# Patient Record
Sex: FEMALE | Race: WHITE | Employment: UNEMPLOYED | URBAN - METROPOLITAN AREA
[De-identification: names, ages, dates, MRNs, and addresses within clinical notes are randomized per-mention and may not be internally consistent; named-entity substitution may affect disease eponyms.]

---

## 2017-04-30 ENCOUNTER — HOSPITAL ENCOUNTER (EMERGENCY)
Age: 37
Discharge: HOME | End: 2017-04-30
Attending: EMERGENCY MEDICINE

## 2017-04-30 VITALS
SYSTOLIC BLOOD PRESSURE: 140 MMHG | OXYGEN SATURATION: 98 % | TEMPERATURE: 98.2 F | RESPIRATION RATE: 20 BRPM | WEIGHT: 155 LBS | BODY MASS INDEX: 25.83 KG/M2 | HEIGHT: 65 IN | DIASTOLIC BLOOD PRESSURE: 90 MMHG | HEART RATE: 110 BPM

## 2017-04-30 DIAGNOSIS — J20.9 ACUTE BRONCHITIS, UNSPECIFIED ORGANISM: Primary | ICD-10-CM

## 2017-04-30 RX ORDER — ALBUTEROL SULFATE 90 UG/1
2 AEROSOL, METERED RESPIRATORY (INHALATION)
Qty: 1 INHALER | Refills: 0 | Status: SHIPPED | OUTPATIENT
Start: 2017-04-30 | End: 2017-08-22

## 2017-04-30 RX ORDER — AMOXICILLIN 875 MG/1
875 TABLET, FILM COATED ORAL 2 TIMES DAILY
Qty: 20 TAB | Refills: 0 | Status: SHIPPED | OUTPATIENT
Start: 2017-04-30 | End: 2017-05-10

## 2017-04-30 RX ORDER — BENZONATATE 100 MG/1
100 CAPSULE ORAL
Qty: 30 CAP | Refills: 0 | Status: SHIPPED | OUTPATIENT
Start: 2017-04-30 | End: 2017-05-07

## 2017-04-30 RX ORDER — ESCITALOPRAM OXALATE 20 MG/1
20 TABLET ORAL DAILY
COMMUNITY
End: 2020-08-27

## 2017-04-30 NOTE — UC PROVIDER NOTE
Patient is a 39 y.o. female presenting with cough. The history is provided by the patient. Cough   This is a new problem. The current episode started more than 2 days ago. The problem occurs hourly. The problem has not changed since onset. The cough is productive of sputum. There has been no fever. Associated symptoms include chest pain and rhinorrhea. Pertinent negatives include no chills, no sweats, no weight loss, no eye redness, no ear congestion, no ear pain, no headaches, no sore throat, no myalgias, no shortness of breath, no wheezing, no nausea, no vomiting and no confusion. Associated symptoms comments: Pain with coughing, sinus congestion, purulent nasal discharge. She has tried nothing for the symptoms. The treatment provided no relief. She is a smoker. Her past medical history is significant for asthma. Her past medical history does not include bronchitis, pneumonia, bronchiectasis, COPD, emphysema, cancer, heart failure or CHF.         Past Medical History:   Diagnosis Date    ADHD (attention deficit hyperactivity disorder)     Anxiety     Anxiety     Asthma     CHILDHOOD    Chronic back pain     Chronic pain     DISC ISSUES IN BACK    Psychiatric disorder     ANXIETY        Past Surgical History:   Procedure Laterality Date    BREAST SURGERY PROCEDURE UNLISTED  2002    reduction    HX BREAST REDUCTION      HX CYST REMOVAL Right     ganglion cyst removed from right wrist    HX GYN      BTL    HX GYN      D&C X 2    HX GYN      Hysterectomy    HX OTHER SURGICAL  5/2013    cyst removed from right hand    HX TONSIL AND ADENOIDECTOMY      HX TONSILLECTOMY      HX WISDOM TEETH EXTRACTION           Family History   Problem Relation Age of Onset    Anxiety Mother     Hypertension Mother     COPD Mother    [facility] Arthritis-osteo Mother     Psychiatric Disorder Mother     Hypertension Father     Cancer Brother     Schizophrenia Maternal Aunt     Heart Disease Maternal Grandfather     Stroke Maternal Grandfather     Cancer Paternal Grandmother     Anesth Problems Neg Hx         Social History     Social History    Marital status: LEGALLY      Spouse name: N/A    Number of children: N/A    Years of education: N/A     Occupational History    Not on file. Social History Main Topics    Smoking status: Former Smoker     Packs/day: 0.25     Years: 19.00    Smokeless tobacco: Current User      Comment: smokes vapor cig     Alcohol use 0.0 oz/week     0 Standard drinks or equivalent per week      Comment: Socially.  Drug use: No    Sexual activity: Not on file     Other Topics Concern    Not on file     Social History Narrative                ALLERGIES: Other food; Codeine; Tylenol-codeine #2; and Nuts [tree nut]    Review of Systems   Constitutional: Negative for chills, fever and weight loss. HENT: Positive for rhinorrhea. Negative for ear pain, mouth sores, nosebleeds and sore throat. Eyes: Negative. Negative for redness. Respiratory: Positive for cough and chest tightness. Negative for shortness of breath and wheezing. Cardiovascular: Positive for chest pain. Gastrointestinal: Negative. Negative for nausea and vomiting. Musculoskeletal: Negative. Negative for myalgias. Neurological: Negative for headaches. Psychiatric/Behavioral: Negative for confusion. All other systems reviewed and are negative. Vitals:    04/30/17 1428   BP: 140/90   Pulse: (!) 110   Resp: 20   Temp: 98.2 °F (36.8 °C)   SpO2: 98%   Weight: 70.3 kg (155 lb)   Height: 5' 5\" (1.651 m)       Physical Exam   Constitutional: She is oriented to person, place, and time. She appears well-developed and well-nourished. HENT:   Head: Normocephalic and atraumatic. Right Ear: External ear normal.   Left Ear: External ear normal.   Mouth/Throat: Oropharynx is clear and moist. No oropharyngeal exudate. Eyes: Conjunctivae and EOM are normal. Pupils are equal, round, and reactive to light. Right eye exhibits no discharge. Left eye exhibits no discharge. No scleral icterus. Neck: Normal range of motion. No tracheal deviation present. No thyromegaly present. Cardiovascular: Normal rate, regular rhythm and normal heart sounds. No murmur heard. Pulmonary/Chest: Effort normal and breath sounds normal. No respiratory distress. She has no wheezes. She has no rales. She exhibits no tenderness. Abdominal: Soft. Bowel sounds are normal. She exhibits no distension. There is no tenderness. There is no rebound and no guarding. Musculoskeletal: Normal range of motion. She exhibits no edema or tenderness. Lymphadenopathy:     She has no cervical adenopathy. Neurological: She is alert and oriented to person, place, and time. No cranial nerve deficit. Coordination normal.   Skin: Skin is warm. No erythema. Psychiatric: She has a normal mood and affect. Her behavior is normal. Judgment and thought content normal.   Nursing note and vitals reviewed.       MDM     Differential Diagnosis; Clinical Impression; Plan:     Acute bronchitis, no respiratory distress, doubt pneumonia      Procedures

## 2017-04-30 NOTE — DISCHARGE INSTRUCTIONS
Bronchitis: Care Instructions  Your Care Instructions    Bronchitis is inflammation of the bronchial tubes, which carry air to the lungs. The tubes swell and produce mucus, or phlegm. The mucus and inflamed bronchial tubes make you cough. You may have trouble breathing. Most cases of bronchitis are caused by viruses like those that cause colds. Antibiotics usually do not help and they may be harmful. Bronchitis usually develops rapidly and lasts about 2 to 3 weeks in otherwise healthy people. Follow-up care is a key part of your treatment and safety. Be sure to make and go to all appointments, and call your doctor if you are having problems. It's also a good idea to know your test results and keep a list of the medicines you take. How can you care for yourself at home? · Take all medicines exactly as prescribed. Call your doctor if you think you are having a problem with your medicine. · Get some extra rest.  · Take an over-the-counter pain medicine, such as acetaminophen (Tylenol), ibuprofen (Advil, Motrin), or naproxen (Aleve) to reduce fever and relieve body aches. Read and follow all instructions on the label. · Do not take two or more pain medicines at the same time unless the doctor told you to. Many pain medicines have acetaminophen, which is Tylenol. Too much acetaminophen (Tylenol) can be harmful. · Take an over-the-counter cough medicine that contains dextromethorphan to help quiet a dry, hacking cough so that you can sleep. Avoid cough medicines that have more than one active ingredient. Read and follow all instructions on the label. · Breathe moist air from a humidifier, hot shower, or sink filled with hot water. The heat and moisture will thin mucus so you can cough it out. · Do not smoke. Smoking can make bronchitis worse. If you need help quitting, talk to your doctor about stop-smoking programs and medicines. These can increase your chances of quitting for good.   When should you call for help? Call 911 anytime you think you may need emergency care. For example, call if:  · You have severe trouble breathing. Call your doctor now or seek immediate medical care if:  · You have new or worse trouble breathing. · You cough up dark brown or bloody mucus (sputum). · You have a new or higher fever. · You have a new rash. Watch closely for changes in your health, and be sure to contact your doctor if:  · You cough more deeply or more often, especially if you notice more mucus or a change in the color of your mucus. · You are not getting better as expected. Where can you learn more? Go to http://jake-laury.info/. Enter H333 in the search box to learn more about \"Bronchitis: Care Instructions. \"  Current as of: May 23, 2016  Content Version: 11.2  © 4020-5512 [employer]. Care instructions adapted under license by [employer] (which disclaims liability or warranty for this information). If you have questions about a medical condition or this instruction, always ask your healthcare professional. [address] any warranty or liability for your use of this information.

## 2017-05-19 ENCOUNTER — HOSPITAL ENCOUNTER (EMERGENCY)
Age: 37
Discharge: HOME | End: 2017-05-19
Attending: EMERGENCY MEDICINE
Payer: COMMERCIAL

## 2017-05-19 VITALS
SYSTOLIC BLOOD PRESSURE: 116 MMHG | DIASTOLIC BLOOD PRESSURE: 80 MMHG | TEMPERATURE: 97.7 F | HEART RATE: 87 BPM | HEIGHT: 65 IN | BODY MASS INDEX: 25.12 KG/M2 | WEIGHT: 150.79 LBS | OXYGEN SATURATION: 99 % | RESPIRATION RATE: 16 BRPM

## 2017-05-19 DIAGNOSIS — Z71.6 TOBACCO ABUSE COUNSELING: ICD-10-CM

## 2017-05-19 DIAGNOSIS — K62.89 ANAL PAIN: Primary | ICD-10-CM

## 2017-05-19 DIAGNOSIS — F41.9 ANXIETY AND DEPRESSION: ICD-10-CM

## 2017-05-19 DIAGNOSIS — F32.A ANXIETY AND DEPRESSION: ICD-10-CM

## 2017-05-19 DIAGNOSIS — S36.60XA RECTAL TRAUMA, INITIAL ENCOUNTER: ICD-10-CM

## 2017-05-19 LAB
APPEARANCE UR: ABNORMAL
BACTERIA URNS QL MICRO: ABNORMAL /HPF
BILIRUB UR QL: NEGATIVE
COLOR UR: ABNORMAL
EPITH CASTS URNS QL MICRO: ABNORMAL /LPF
GLUCOSE UR STRIP.AUTO-MCNC: NEGATIVE MG/DL
HCG UR QL: NEGATIVE
HGB UR QL STRIP: NEGATIVE
KETONES UR QL STRIP.AUTO: NEGATIVE MG/DL
LEUKOCYTE ESTERASE UR QL STRIP.AUTO: NEGATIVE
MUCOUS THREADS URNS QL MICRO: ABNORMAL /LPF
NITRITE UR QL STRIP.AUTO: NEGATIVE
PH UR STRIP: 5.5 [PH] (ref 5–8)
PROT UR STRIP-MCNC: NEGATIVE MG/DL
RBC #/AREA URNS HPF: ABNORMAL /HPF (ref 0–5)
SP GR UR REFRACTOMETRY: 1.02 (ref 1–1.03)
UA: UC IF INDICATED,UAUC: ABNORMAL
UROBILINOGEN UR QL STRIP.AUTO: 1 EU/DL (ref 0.2–1)
WBC URNS QL MICRO: ABNORMAL /HPF (ref 0–4)

## 2017-05-19 PROCEDURE — 81001 URINALYSIS AUTO W/SCOPE: CPT | Performed by: PHYSICIAN ASSISTANT

## 2017-05-19 PROCEDURE — 81025 URINE PREGNANCY TEST: CPT | Performed by: PHYSICIAN ASSISTANT

## 2017-05-19 PROCEDURE — 99283 EMERGENCY DEPT VISIT LOW MDM: CPT

## 2017-05-19 PROCEDURE — 87086 URINE CULTURE/COLONY COUNT: CPT | Performed by: PHYSICIAN ASSISTANT

## 2017-05-19 RX ORDER — DOCUSATE CALCIUM 240 MG
240 CAPSULE ORAL 2 TIMES DAILY
Qty: 30 CAP | Refills: 0 | Status: SHIPPED | OUTPATIENT
Start: 2017-05-19 | End: 2017-08-17

## 2017-05-19 RX ORDER — POLYETHYLENE GLYCOL 3350 17 G/17G
17 POWDER, FOR SOLUTION ORAL DAILY
Qty: 119 G | Refills: 0 | Status: SHIPPED | OUTPATIENT
Start: 2017-05-19 | End: 2017-08-22

## 2017-05-19 RX ORDER — LIDOCAINE HYDROCHLORIDE 20 MG/ML
JELLY TOPICAL
Qty: 20 ML | Refills: 0 | Status: SHIPPED | OUTPATIENT
Start: 2017-05-19 | End: 2017-08-22

## 2017-05-19 RX ORDER — CLONAZEPAM 0.5 MG/1
0.5 TABLET ORAL 2 TIMES DAILY
COMMUNITY
End: 2020-08-27

## 2017-05-19 NOTE — LETTER
[address] 
[facility] EMERGENCY DEPT 
[address] [place] [address] [address] [id number] 
[phone] Work/School Note Date: 5/19/2017 To Whom It May concern: 
 
Esther Hinson was seen and treated today in the emergency room by the following provider(s): 
Attending Provider: Shell De La Cruz MD 
Physician Assistant: Jorge Alberto Nance. Bam Pinzonma. Esther Hinson may return to work on 05/21/2017. Sincerely, 
 
 
 
 
Jogre Alberto Nance.  [place], [place]

## 2017-05-19 NOTE — DISCHARGE INSTRUCTIONS
Anal Pain: Care Instructions  Your Care Instructions  Pain in the opening to the rectum (anus) can be caused by diarrhea or constipation or by scratching a rectal itch. A common cause of anal pain is a tear in the lining of the lower rectum (anal fissure). This type of anal pain usually goes away when the problem clears up. Injury during anal sex or from an object being placed in the rectum also can cause pain. A rare cause of anal pain is spasms of the muscles in the rectum. Some of these conditions may cause some light bleeding. Home treatment usually can relieve anal pain. If you continue to have anal pain, your doctor may prescribe medicine to relieve pain and other symptoms. Depending on the cause, you may need other treatment. Follow-up care is a key part of your treatment and safety. Be sure to make and go to all appointments, and call your doctor if you are having problems. It's also a good idea to know your test results and keep a list of the medicines you take. How can you care for yourself at home? · Sit in a few inches of warm water (sitz bath) 3 times a day and after bowel movements. The warm water eases discomfort. Do not put soaps, salts, or shampoos in the water. · Drink plenty of fluids, enough so that your urine is light yellow or clear like water. If you have kidney, heart, or liver disease and have to limit fluids, talk with your doctor before you increase the amount of fluids you drink. · Include high-fiber foods, such as fruits, vegetables, beans, and whole grains, in your diet each day. · Take a fiber supplement, such as Benefiber, Citrucel, or Metamucil, every day. Read and follow all instructions on the label. · Use the toilet when you feel the urge. Or when you can, schedule time each day for a bowel movement. A daily routine may help. Take your time and do not strain when having a bowel movement. But do not sit on the toilet too long.   · Support your feet with a small step stool when you sit on the toilet. This helps flex your hips and places your pelvis in a squatting position. · Your doctor may recommend an over-the-counter laxative, such as Miralax, Milk of Magnesia, or Ex-Lax. Read and follow all instructions on the label, and do not use laxatives on a long-term basis. · Do not use over-the-counter ointments or creams without talking to your doctor. Some of these may not help. · Use baby wipes or medicated pads, such as Preparation H or Tucks, instead of toilet paper to clean after a bowel movement. These products do not irritate the anus. · Be safe with medicines. Read and follow all instructions on the label. If the doctor gave you a prescription medicine for pain, take it as prescribed. If you are not taking a prescription pain medicine, ask your doctor if you can take an over-the-counter medicine. When should you call for help? Call 911 anytime you think you may need emergency care. For example, call if:  · You passed out (lost consciousness). Call your doctor now or seek immediate medical care if:  · You have a fever. · You have swelling, a lump, a sore, or a new growth in or around your anus. · Your stools are black and tarlike or have streaks of blood. Watch closely for changes in your health, and be sure to contact your doctor if:  · You do not get better as expected. Where can you learn more? Go to http://jake-laury.info/. Enter 152 7590 in the search box to learn more about \"Anal Pain: Care Instructions. \"  Current as of: August 9, 2016  Content Version: 11.2  © 0494-9820 [employer]. Care instructions adapted under license by [employer] (which disclaims liability or warranty for this information). If you have questions about a medical condition or this instruction, always ask your healthcare professional. [address] any warranty or liability for your use of this information.          Injury to the Rectum and Vagina: Care Instructions  Your Care Instructions  Injury to the rectum and vagina can cause many problems. These problems may include rectal or vaginal bleeding, infection, constipation, pain, or leaking of stool. The injury can be caused by an accident, childbirth, or physical or sexual abuse. Medicines can treat pain. They can also prevent infection. Surgery may be needed to treat severe injuries. Follow-up care is a key part of your treatment and safety. Be sure to make and go to all appointments, and call your doctor if you are having problems. It's also a good idea to know your test results and keep a list of the medicines you take. How can you care for yourself at home? · Be safe with medicines. Read and follow all instructions on the label. ¨ If the doctor gave you a prescription medicine for pain, take it as prescribed. ¨ If you are not taking a prescription pain medicine, ask your doctor if you can take an over-the-counter medicine. · If your doctor suggests, sit in a few inches of water (sitz bath) 3 times a day and after bowel movements. The warm water helps with pain and itching. · If you do not have a safe place to stay, tell your doctor. When should you call for help? Call 911 anytime you think you may need emergency care. For example, call if:  · You passed out (lost consciousness). · You feel that you are in danger. Call your doctor now or seek immediate medical care if:  · You have new or worse rectal or vaginal bleeding. · You are dizzy or lightheaded, or you feel like you may faint. · You have a fever. · You have sudden, severe pain in your belly or pelvis. Watch closely for changes in your health, and be sure to contact your doctor if:  · You need support for domestic violence or sexual abuse. · You do not get better as expected. Where can you learn more? Go to http://jake-laury.info/.   Enter X034 in the search box to learn more about \"Injury to the Rectum and Vagina: Care Instructions. \"  Current as of: May 27, 2016  Content Version: 11.2  © 2833-6360 [employer]. Care instructions adapted under license by [employer] (which disclaims liability or warranty for this information). If you have questions about a medical condition or this instruction, always ask your healthcare professional. [address] any warranty or liability for your use of this information.

## 2017-05-19 NOTE — ED NOTES
Received pt to exam room, resting on stretcher in position of comfort, call bell given to pt, pt reports sudden onset of rectal pain at 0230 today, states the pain woke her up, her last BM was yesterday and she denies constipation or straining to use the BR, pt reports having anal intercourse 1 wk ago with no subsequent pain after

## 2017-05-19 NOTE — ED PROVIDER NOTES
HPI Comments:   Corona Shabazz is a 39 y.o. female with a hx of anxiety, ADHD, and asthma presenting to the ED C/O rectal pain (8/10) which started early this morning and woke her out of her sleep. The pain worsens with sitting and any movement. Pt denies experiencing similar pain in the past. She has taken Tylenol and Advil for the pain with no relief. Pt last had anal intercourse 1 week ago, which she has previously done in the past, but this was with a new partner. LNBM was yesterday. She is a current smoker. PSHx significant for hysterectomy. Patient denies constipation, rectal bleeding, fever, chills, abd pain, vaginal discharge, or any other symptoms or complaints. PCP: Alan Driver MD    There are no other complaints, changes or physical findings at this time. Written by KARISHMA Berrios, as dictated by Debbie Davis PA-C      The history is provided by the patient. No  was used.         Past Medical History:   Diagnosis Date    ADHD (attention deficit hyperactivity disorder)     Anxiety     Anxiety     Asthma     CHILDHOOD    Chronic back pain     Chronic pain     DISC ISSUES IN BACK    Psychiatric disorder     ANXIETY       Past Surgical History:   Procedure Laterality Date    BREAST SURGERY PROCEDURE UNLISTED  2002    reduction    HX BREAST REDUCTION      HX CYST REMOVAL Right     ganglion cyst removed from right wrist    HX GYN      BTL    HX GYN      D&C X 2    HX GYN      Hysterectomy    HX OTHER SURGICAL  5/2013    cyst removed from right hand    HX TONSIL AND ADENOIDECTOMY      HX TONSILLECTOMY      HX WISDOM TEETH EXTRACTION           Family History:   Problem Relation Age of Onset    Anxiety Mother     Hypertension Mother     COPD Mother     Arthritis-osteo Mother     Psychiatric Disorder Mother     Hypertension Father     Cancer Brother     Schizophrenia Maternal Aunt     Heart Disease Maternal Grandfather     Stroke Maternal Grandfather     Cancer Paternal Grandmother     Anesth Problems Neg Hx        Social History     Social History    Marital status: LEGALLY      Spouse name: N/A    Number of children: N/A    Years of education: N/A     Occupational History    Not on file. Social History Main Topics    Smoking status: Current Some Day Smoker     Packs/day: 0.25     Years: 19.00    Smokeless tobacco: Current User      Comment: smokes vapor cig     Alcohol use 0.0 oz/week     0 Standard drinks or equivalent per week      Comment: Socially.  Drug use: No    Sexual activity: Not on file     Other Topics Concern    Not on file     Social History Narrative         ALLERGIES: Other food; Codeine; Tylenol-codeine #2; and Nuts [tree nut]    Review of Systems   Constitutional: Negative. Negative for activity change, appetite change, chills, diaphoresis, fever and unexpected weight change. HENT: Negative for congestion, hearing loss, rhinorrhea, sinus pressure, sneezing, sore throat and trouble swallowing. Eyes: Negative for pain, redness, itching and visual disturbance. Respiratory: Negative for cough, shortness of breath and wheezing. Cardiovascular: Negative for chest pain, palpitations and leg swelling. Gastrointestinal: Positive for rectal pain. Negative for abdominal pain, blood in stool, constipation, diarrhea, nausea and vomiting. Genitourinary: Negative for dysuria and vaginal discharge. Musculoskeletal: Negative for arthralgias, gait problem and myalgias. Skin: Negative for color change, pallor, rash and wound. Neurological: Negative for tremors, weakness, light-headedness, numbness and headaches. All other systems reviewed and are negative.       Vitals:    05/19/17 0929   BP: 116/80   Pulse: 87   Resp: 16   Temp: 97.7 °F (36.5 °C)   SpO2: 99%   Weight: 68.4 kg (150 lb 12.7 oz)   Height: 5' 5\" (1.651 m)            Physical Exam   Constitutional: She is oriented to person, place, and time. She appears well-developed and well-nourished. No distress. 39 y.o.  female in NAD  Communicates appropriately and in full sentences   HENT:   Head: Normocephalic and atraumatic. Right Ear: External ear normal.   Left Ear: External ear normal.   Mouth/Throat: Oropharynx is clear and moist. No oropharyngeal exudate. Eyes: Conjunctivae are normal. Pupils are equal, round, and reactive to light. Right eye exhibits no discharge. Left eye exhibits no discharge. Neck: Normal range of motion. Neck supple. No tracheal deviation present. Cardiovascular: Normal rate, regular rhythm, normal heart sounds and intact distal pulses. Pulmonary/Chest: Effort normal and breath sounds normal. No stridor. No respiratory distress. She has no wheezes. Abdominal: Soft. Bowel sounds are normal. She exhibits no distension. No abd tenderness to light or deep palpation   Genitourinary: Rectal exam shows internal hemorrhoid and tenderness. Rectal exam shows no external hemorrhoid, no fissure, no mass, anal tone normal and guaiac negative stool. Genitourinary Comments: Light brown stool, heme negative, internal hemorrhoid at 12 o'clock position  Small linear lacerations to suggest anal trauma sexually or with object, not actively bleeding   Musculoskeletal: Normal range of motion. She exhibits no edema, tenderness or deformity. No neurologic, motor, vascular, or compartment embarrassment observed on exam. No focal neurologic deficits. Lymphadenopathy:     She has no cervical adenopathy. Neurological: She is alert and oriented to person, place, and time. No cranial nerve deficit. Coordination normal.   Skin: Skin is warm and dry. No rash noted. She is not diaphoretic. No erythema. No pallor. Psychiatric: She has a normal mood and affect. Nursing note and vitals reviewed.        MDM  Number of Diagnoses or Management Options  Anal pain:   Anxiety and depression:   Rectal trauma, initial encounter: Tobacco abuse counseling:   Diagnosis management comments: DDx: hemorrhoids, anal sex, perirectal abscess, colonial cyst    40 yo with anal pain after experiencing anal intercourse in the last week with new partner. Pt denies melena, vaginal bleeding, discharge. Performed anoscopy which revealed signs of anal trauma/penetration. Will discharge with Vagisil and lidocaine jelly. Vital signs remain stable without fever or tachycardia. Stable for discharge. Pt expresses understanding. Provided customary ED return precautions. Amount and/or Complexity of Data Reviewed  Clinical lab tests: ordered and reviewed    Patient Progress  Patient progress: stable    Procedures    I reviewed our electronic medical record system for any past medical records that were available that may contribute to the patients current condition, the nursing notes and vital signs from today's visit     Nursing notes will be reviewed as they become available in realtime while the pt is in the ED. Progress Note:  9:40 AM  The patients presenting problems have been discussed, and they are in agreement with the care plan formulated and outlined with them. I have encouraged them to ask questions as they arise throughout their visit. Will continue to monitor. TOBACCO COUNSELING:  Upon evaluation, pt expressed that they are a current tobacco user. Pt has been counseled on the dangers of smoking and was encouraged to quit as soon as possible in order to decrease further risks to their health. Pt has conveyed their understanding of the risks involved should they continue to use tobacco products. PROCEDURE NOTE - RECTAL EXAM:   10:15 AM  Performed by: [employer]  Rectal exam performed. Light brown stool was collected. Stool was Hemoccult tested, and found to be heme Negative. The procedure took 1-15 minutes, and pt tolerated well.   Written by KARISHMA Goel, as dictated by [employer], ENRRI[employer].    Progress Note:  10:17 AM  Spoke with Dr. Olivia Huber, she agrees with the plan as discussed. Will call [facility] to obtain an anoscope for a more thorough rectal exam.    Procedure Note - Anoscope Exam:   10:47 AM  Performed by: [employer], ENRRIQUE   Anoscope exam performed. Multiple small, non-bleeing linear cuts in rectal vault suggestive of anal trauma/penetration. The procedure took 1-15 minutes, and pt tolerated well. DISCHARGE NOTE:  11:05 AM  Ivana Hodges  results have been reviewed with her. She has been counseled regarding her diagnosis. She verbally conveys understanding and agreement of the signs, symptoms, diagnosis, treatment and prognosis and additionally agrees to follow up as recommended with Dr. Sb Long MD in 24 - 48 hours. She also agrees with the care-plan and conveys that all of her questions have been answered. I have also put together some discharge instructions for her that include: 1) educational information regarding their diagnosis, 2) how to care for their diagnosis at home, as well a 3) list of reasons why they would want to return to the ED prior to their follow-up appointment, should their condition change. She and/or family's questions have been answered. I have encouraged them to see the official results in [facility]\" or to retrieve the specifics of their results from medical records.        LABS COMPLETED AND REVIEWED:  Recent Results (from the past 12 hour(s))   URINALYSIS W/ REFLEX CULTURE    Collection Time: 05/19/17 10:32 AM   Result Value Ref Range    Color YELLOW/STRAW      Appearance CLOUDY (A) CLEAR      Specific gravity 1.020 1.003 - 1.030      pH (UA) 5.5 5.0 - 8.0      Protein NEGATIVE  NEG mg/dL    Glucose NEGATIVE  NEG mg/dL    Ketone NEGATIVE  NEG mg/dL    Bilirubin NEGATIVE  NEG      Blood NEGATIVE  NEG      Urobilinogen 1.0 0.2 - 1.0 EU/dL    Nitrites NEGATIVE  NEG      Leukocyte Esterase NEGATIVE  NEG      WBC 0-4 0 - 4 /hpf    RBC 0-5 0 - 5 /hpf    Epithelial cells MANY (A) FEW /lpf    Bacteria 1+ (A) NEG /hpf    UA:UC IF INDICATED URINE CULTURE ORDERED (A) CNI      Mucus 1+ (A) NEG /lpf   HCG URINE, QL    Collection Time: 05/19/17 10:32 AM   Result Value Ref Range    HCG urine, Ql. NEGATIVE  NEG         CLINICAL IMPRESSION:  1. Anal pain    2. Rectal trauma, initial encounter    3. Anxiety and depression    4. Tobacco abuse counseling        Plan:  1. Return precautions  2. Medications as prescribed  3. Follow-ups as discussed  Discharge Medication List as of 5/19/2017 11:03 AM      START taking these medications    Details   lidocaine (URO-JET) 2 % jelp jelly Apply to affected area, Normal, Disp-20 mL, R-0      benzocaine-resorcinol-aloe-vit E-vit A&D (VAGISIL) 20-3 % topical cream Apply 3 g to affected area three (3) times daily. , Normal, Disp-28 g, R-0         CONTINUE these medications which have NOT CHANGED    Details   clonazePAM (KLONOPIN) 0.5 mg tablet Take 0.5 mg by mouth two (2) times a day., Historical Med      Lisdexamfetamine (VYVANSE) 50 mg cap Take by mouth daily. , Historical Med      escitalopram oxalate (LEXAPRO) 20 mg tablet Take 20 mg by mouth daily. , Historical Med      ALPRAZolam (XANAX) 1 mg tablet Take 1-2 mg by mouth three (3) times daily as needed., Historical Med, R-2      albuterol (VENTOLIN HFA) 90 mcg/actuation inhaler Take 2 Puffs by inhalation every six (6) hours as needed for Wheezing., Normal, Disp-1 Inhaler, R-0           Follow-up Information     Follow up With Details Comments Contact Info    Kane Schneider MD Schedule an appointment as soon as possible for a visit in 2 days As needed, If symptoms worsen, Possible further evaluation and treatment [place] [address]  [address] [facility]  [phone]      [facility] EMERGENCY DEPT Go to As needed, If symptoms worsen [address] [facility] [address] Watson Lay MD Schedule an appointment as soon as possible for a visit in 2 days As needed, If symptoms worsen, Possible further evaluation and treatment [address] [address] [phone]          Return to the closest emergency room or follow up sooner for any deterioration    Attestation: This note is prepared by Angel Bingham, acting as Scribe for Luciano Page PA-C. Luciano Page PA-C: The scribe's documentation has been prepared under my direction and personally reviewed by me in its entirety. I confirm that the note above accurately reflects all work, treatment, procedures, and medical decision making performed by me. This note will not be viewable in [address]

## 2017-05-20 ENCOUNTER — ANESTHESIA EVENT (OUTPATIENT)
Dept: SURGERY | Age: 37
DRG: 854 | End: 2017-05-20
Payer: COMMERCIAL

## 2017-05-20 ENCOUNTER — APPOINTMENT (OUTPATIENT)
Dept: CT IMAGING | Age: 37
DRG: 854 | End: 2017-05-20
Attending: EMERGENCY MEDICINE
Payer: COMMERCIAL

## 2017-05-20 ENCOUNTER — ANESTHESIA (OUTPATIENT)
Dept: SURGERY | Age: 37
DRG: 854 | End: 2017-05-20
Payer: COMMERCIAL

## 2017-05-20 ENCOUNTER — HOSPITAL ENCOUNTER (INPATIENT)
Age: 37
LOS: 4 days | Discharge: HOME | DRG: 854 | End: 2017-05-24
Attending: EMERGENCY MEDICINE | Admitting: COLON & RECTAL SURGERY
Payer: COMMERCIAL

## 2017-05-20 DIAGNOSIS — K61.0 PERIANAL ABSCESS: Primary | ICD-10-CM

## 2017-05-20 LAB
ALBUMIN SERPL BCP-MCNC: 3.4 G/DL (ref 3.5–5)
ALBUMIN/GLOB SERPL: 1.1 {RATIO} (ref 1.1–2.2)
ALP SERPL-CCNC: 55 U/L (ref 45–117)
ALT SERPL-CCNC: 20 U/L (ref 12–78)
ANION GAP BLD CALC-SCNC: 5 MMOL/L (ref 5–15)
APPEARANCE UR: CLEAR
AST SERPL W P-5'-P-CCNC: 15 U/L (ref 15–37)
BACTERIA URNS QL MICRO: ABNORMAL /HPF
BASOPHILS # BLD AUTO: 0 K/UL (ref 0–0.1)
BASOPHILS # BLD: 0 % (ref 0–1)
BILIRUB SERPL-MCNC: 0.7 MG/DL (ref 0.2–1)
BILIRUB UR QL: NEGATIVE
BUN SERPL-MCNC: 10 MG/DL (ref 6–20)
BUN/CREAT SERPL: 11 (ref 12–20)
CALCIUM SERPL-MCNC: 8.8 MG/DL (ref 8.5–10.1)
CHLORIDE SERPL-SCNC: 105 MMOL/L (ref 97–108)
CO2 SERPL-SCNC: 29 MMOL/L (ref 21–32)
COLOR UR: ABNORMAL
CREAT SERPL-MCNC: 0.87 MG/DL (ref 0.55–1.02)
DIFFERENTIAL METHOD BLD: ABNORMAL
EOSINOPHIL # BLD: 0 K/UL (ref 0–0.4)
EOSINOPHIL NFR BLD: 0 % (ref 0–7)
EPITH CASTS URNS QL MICRO: ABNORMAL /LPF
ERYTHROCYTE [DISTWIDTH] IN BLOOD BY AUTOMATED COUNT: 13.4 % (ref 11.5–14.5)
GLOBULIN SER CALC-MCNC: 3.2 G/DL (ref 2–4)
GLUCOSE SERPL-MCNC: 96 MG/DL (ref 65–100)
GLUCOSE UR STRIP.AUTO-MCNC: NEGATIVE MG/DL
HCG UR QL: NEGATIVE
HCT VFR BLD AUTO: 34 % (ref 35–47)
HGB BLD-MCNC: 12 G/DL (ref 11.5–16)
HGB UR QL STRIP: NEGATIVE
KETONES UR QL STRIP.AUTO: 15 MG/DL
LACTATE SERPL-SCNC: 1.9 MMOL/L (ref 0.4–2)
LEUKOCYTE ESTERASE UR QL STRIP.AUTO: NEGATIVE
LYMPHOCYTES # BLD AUTO: 2 % (ref 12–49)
LYMPHOCYTES # BLD: 0.3 K/UL (ref 0.8–3.5)
MCH RBC QN AUTO: 33.2 PG (ref 26–34)
MCHC RBC AUTO-ENTMCNC: 35.3 G/DL (ref 30–36.5)
MCV RBC AUTO: 94.2 FL (ref 80–99)
MONOCYTES # BLD: 0.8 K/UL (ref 0–1)
MONOCYTES NFR BLD AUTO: 5 % (ref 5–13)
NEUTS SEG # BLD: 15.8 K/UL (ref 1.8–8)
NEUTS SEG NFR BLD AUTO: 93 % (ref 32–75)
NITRITE UR QL STRIP.AUTO: NEGATIVE
PH UR STRIP: 7 [PH] (ref 5–8)
PLATELET # BLD AUTO: 132 K/UL (ref 150–400)
POTASSIUM SERPL-SCNC: 3.3 MMOL/L (ref 3.5–5.1)
PROT SERPL-MCNC: 6.6 G/DL (ref 6.4–8.2)
PROT UR STRIP-MCNC: NEGATIVE MG/DL
RBC # BLD AUTO: 3.61 M/UL (ref 3.8–5.2)
RBC #/AREA URNS HPF: ABNORMAL /HPF (ref 0–5)
RBC MORPH BLD: ABNORMAL
SODIUM SERPL-SCNC: 139 MMOL/L (ref 136–145)
SP GR UR REFRACTOMETRY: >1.03 (ref 1–1.03)
UA: UC IF INDICATED,UAUC: ABNORMAL
UROBILINOGEN UR QL STRIP.AUTO: 1 EU/DL (ref 0.2–1)
WBC # BLD AUTO: 16.9 K/UL (ref 3.6–11)
WBC URNS QL MICRO: ABNORMAL /HPF (ref 0–4)

## 2017-05-20 PROCEDURE — 77030011640 HC PAD GRND REM COVD -A: Performed by: COLON & RECTAL SURGERY

## 2017-05-20 PROCEDURE — 74011250636 HC RX REV CODE- 250/636

## 2017-05-20 PROCEDURE — 87086 URINE CULTURE/COLONY COUNT: CPT | Performed by: EMERGENCY MEDICINE

## 2017-05-20 PROCEDURE — 76060000032 HC ANESTHESIA 0.5 TO 1 HR: Performed by: COLON & RECTAL SURGERY

## 2017-05-20 PROCEDURE — 74011250636 HC RX REV CODE- 250/636: Performed by: ANESTHESIOLOGY

## 2017-05-20 PROCEDURE — 81025 URINE PREGNANCY TEST: CPT | Performed by: EMERGENCY MEDICINE

## 2017-05-20 PROCEDURE — 99285 EMERGENCY DEPT VISIT HI MDM: CPT

## 2017-05-20 PROCEDURE — 74011250637 HC RX REV CODE- 250/637: Performed by: COLON & RECTAL SURGERY

## 2017-05-20 PROCEDURE — 0D9P0ZZ DRAINAGE OF RECTUM, OPEN APPROACH: ICD-10-PCS | Performed by: COLON & RECTAL SURGERY

## 2017-05-20 PROCEDURE — 96365 THER/PROPH/DIAG IV INF INIT: CPT

## 2017-05-20 PROCEDURE — 99218 HC RM OBSERVATION: CPT

## 2017-05-20 PROCEDURE — 96375 TX/PRO/DX INJ NEW DRUG ADDON: CPT

## 2017-05-20 PROCEDURE — 74011250636 HC RX REV CODE- 250/636: Performed by: COLON & RECTAL SURGERY

## 2017-05-20 PROCEDURE — 94762 N-INVAS EAR/PLS OXIMTRY CONT: CPT

## 2017-05-20 PROCEDURE — 74011000250 HC RX REV CODE- 250

## 2017-05-20 PROCEDURE — 77030032490 HC SLV COMPR SCD KNE COVD -B

## 2017-05-20 PROCEDURE — 77030020061 HC IV BLD WRMR ADMIN SET 3M -B: Performed by: ANESTHESIOLOGY

## 2017-05-20 PROCEDURE — 87147 CULTURE TYPE IMMUNOLOGIC: CPT | Performed by: EMERGENCY MEDICINE

## 2017-05-20 PROCEDURE — 80053 COMPREHEN METABOLIC PANEL: CPT | Performed by: EMERGENCY MEDICINE

## 2017-05-20 PROCEDURE — 65270000029 HC RM PRIVATE

## 2017-05-20 PROCEDURE — 77030026438 HC STYL ET INTUB CARD -A: Performed by: ANESTHESIOLOGY

## 2017-05-20 PROCEDURE — 81001 URINALYSIS AUTO W/SCOPE: CPT | Performed by: EMERGENCY MEDICINE

## 2017-05-20 PROCEDURE — 74011000250 HC RX REV CODE- 250: Performed by: EMERGENCY MEDICINE

## 2017-05-20 PROCEDURE — 74011636320 HC RX REV CODE- 636/320: Performed by: EMERGENCY MEDICINE

## 2017-05-20 PROCEDURE — 36415 COLL VENOUS BLD VENIPUNCTURE: CPT | Performed by: EMERGENCY MEDICINE

## 2017-05-20 PROCEDURE — 96376 TX/PRO/DX INJ SAME DRUG ADON: CPT

## 2017-05-20 PROCEDURE — 87075 CULTR BACTERIA EXCEPT BLOOD: CPT | Performed by: EMERGENCY MEDICINE

## 2017-05-20 PROCEDURE — 96372 THER/PROPH/DIAG INJ SC/IM: CPT

## 2017-05-20 PROCEDURE — 77030008684 HC TU ET CUF COVD -B: Performed by: ANESTHESIOLOGY

## 2017-05-20 PROCEDURE — 87205 SMEAR GRAM STAIN: CPT | Performed by: EMERGENCY MEDICINE

## 2017-05-20 PROCEDURE — 76010000138 HC OR TIME 0.5 TO 1 HR: Performed by: COLON & RECTAL SURGERY

## 2017-05-20 PROCEDURE — 0DJD8ZZ INSPECTION OF LOWER INTESTINAL TRACT, VIA NATURAL OR ARTIFICIAL OPENING ENDOSCOPIC: ICD-10-PCS | Performed by: COLON & RECTAL SURGERY

## 2017-05-20 PROCEDURE — 96361 HYDRATE IV INFUSION ADD-ON: CPT

## 2017-05-20 PROCEDURE — 77030008771 HC TU NG SALEM SUMP -A: Performed by: ANESTHESIOLOGY

## 2017-05-20 PROCEDURE — 74011250636 HC RX REV CODE- 250/636: Performed by: EMERGENCY MEDICINE

## 2017-05-20 PROCEDURE — 74011000250 HC RX REV CODE- 250: Performed by: COLON & RECTAL SURGERY

## 2017-05-20 PROCEDURE — 85025 COMPLETE CBC W/AUTO DIFF WBC: CPT | Performed by: EMERGENCY MEDICINE

## 2017-05-20 PROCEDURE — 76210000016 HC OR PH I REC 1 TO 1.5 HR: Performed by: COLON & RECTAL SURGERY

## 2017-05-20 PROCEDURE — 77030019908 HC STETH ESOPH SIMS -A: Performed by: ANESTHESIOLOGY

## 2017-05-20 PROCEDURE — 74177 CT ABD & PELVIS W/CONTRAST: CPT

## 2017-05-20 PROCEDURE — 77030013079 HC BLNKT BAIR HGGR 3M -A: Performed by: ANESTHESIOLOGY

## 2017-05-20 PROCEDURE — 83605 ASSAY OF LACTIC ACID: CPT | Performed by: EMERGENCY MEDICINE

## 2017-05-20 RX ORDER — ACETAMINOPHEN 325 MG/1
650 TABLET ORAL
Status: DISCONTINUED | OUTPATIENT
Start: 2017-05-20 | End: 2017-05-24 | Stop reason: HOSPADM

## 2017-05-20 RX ORDER — LEVOFLOXACIN 5 MG/ML
500 INJECTION, SOLUTION INTRAVENOUS ONCE
Status: DISCONTINUED | OUTPATIENT
Start: 2017-05-20 | End: 2017-05-20

## 2017-05-20 RX ORDER — ONDANSETRON 2 MG/ML
4 INJECTION INTRAMUSCULAR; INTRAVENOUS
Status: COMPLETED | OUTPATIENT
Start: 2017-05-20 | End: 2017-05-20

## 2017-05-20 RX ORDER — SUCCINYLCHOLINE CHLORIDE 20 MG/ML
INJECTION INTRAMUSCULAR; INTRAVENOUS AS NEEDED
Status: DISCONTINUED | OUTPATIENT
Start: 2017-05-20 | End: 2017-05-20 | Stop reason: HOSPADM

## 2017-05-20 RX ORDER — SODIUM CHLORIDE 9 MG/ML
125 INJECTION, SOLUTION INTRAVENOUS CONTINUOUS
Status: DISCONTINUED | OUTPATIENT
Start: 2017-05-20 | End: 2017-05-24 | Stop reason: HOSPADM

## 2017-05-20 RX ORDER — METRONIDAZOLE 500 MG/100ML
500 INJECTION, SOLUTION INTRAVENOUS
Status: COMPLETED | OUTPATIENT
Start: 2017-05-20 | End: 2017-05-22

## 2017-05-20 RX ORDER — HYDROMORPHONE HYDROCHLORIDE 2 MG/ML
INJECTION, SOLUTION INTRAMUSCULAR; INTRAVENOUS; SUBCUTANEOUS
Status: DISPENSED
Start: 2017-05-20 | End: 2017-05-20

## 2017-05-20 RX ORDER — NALOXONE HYDROCHLORIDE 0.4 MG/ML
0.4 INJECTION, SOLUTION INTRAMUSCULAR; INTRAVENOUS; SUBCUTANEOUS
Status: DISCONTINUED | OUTPATIENT
Start: 2017-05-20 | End: 2017-05-24 | Stop reason: HOSPADM

## 2017-05-20 RX ORDER — HYDROMORPHONE HYDROCHLORIDE 1 MG/ML
1 INJECTION, SOLUTION INTRAMUSCULAR; INTRAVENOUS; SUBCUTANEOUS
Status: COMPLETED | OUTPATIENT
Start: 2017-05-20 | End: 2017-05-20

## 2017-05-20 RX ORDER — ONDANSETRON 2 MG/ML
INJECTION INTRAMUSCULAR; INTRAVENOUS AS NEEDED
Status: DISCONTINUED | OUTPATIENT
Start: 2017-05-20 | End: 2017-05-20 | Stop reason: HOSPADM

## 2017-05-20 RX ORDER — LIDOCAINE HYDROCHLORIDE 10 MG/ML
INJECTION, SOLUTION EPIDURAL; INFILTRATION; INTRACAUDAL; PERINEURAL
Status: COMPLETED
Start: 2017-05-20 | End: 2017-05-20

## 2017-05-20 RX ORDER — SODIUM CHLORIDE 0.9 % (FLUSH) 0.9 %
5-10 SYRINGE (ML) INJECTION AS NEEDED
Status: DISCONTINUED | OUTPATIENT
Start: 2017-05-20 | End: 2017-05-24 | Stop reason: HOSPADM

## 2017-05-20 RX ORDER — FENTANYL CITRATE 50 UG/ML
25 INJECTION, SOLUTION INTRAMUSCULAR; INTRAVENOUS
Status: DISCONTINUED | OUTPATIENT
Start: 2017-05-20 | End: 2017-05-20 | Stop reason: HOSPADM

## 2017-05-20 RX ORDER — DEXAMETHASONE SODIUM PHOSPHATE 4 MG/ML
INJECTION, SOLUTION INTRA-ARTICULAR; INTRALESIONAL; INTRAMUSCULAR; INTRAVENOUS; SOFT TISSUE AS NEEDED
Status: DISCONTINUED | OUTPATIENT
Start: 2017-05-20 | End: 2017-05-20 | Stop reason: HOSPADM

## 2017-05-20 RX ORDER — ESCITALOPRAM OXALATE 10 MG/1
20 TABLET ORAL DAILY
Status: DISCONTINUED | OUTPATIENT
Start: 2017-05-21 | End: 2017-05-24 | Stop reason: HOSPADM

## 2017-05-20 RX ORDER — MIDAZOLAM HYDROCHLORIDE 1 MG/ML
INJECTION, SOLUTION INTRAMUSCULAR; INTRAVENOUS AS NEEDED
Status: DISCONTINUED | OUTPATIENT
Start: 2017-05-20 | End: 2017-05-20 | Stop reason: HOSPADM

## 2017-05-20 RX ORDER — ACETAMINOPHEN 10 MG/ML
INJECTION, SOLUTION INTRAVENOUS AS NEEDED
Status: DISCONTINUED | OUTPATIENT
Start: 2017-05-20 | End: 2017-05-20 | Stop reason: HOSPADM

## 2017-05-20 RX ORDER — SODIUM CHLORIDE 0.9 % (FLUSH) 0.9 %
10 SYRINGE (ML) INJECTION
Status: COMPLETED | OUTPATIENT
Start: 2017-05-20 | End: 2017-05-20

## 2017-05-20 RX ORDER — METRONIDAZOLE 500 MG/100ML
500 INJECTION, SOLUTION INTRAVENOUS EVERY 6 HOURS
Status: COMPLETED | OUTPATIENT
Start: 2017-05-20 | End: 2017-05-22

## 2017-05-20 RX ORDER — PROPOFOL 10 MG/ML
INJECTION, EMULSION INTRAVENOUS AS NEEDED
Status: DISCONTINUED | OUTPATIENT
Start: 2017-05-20 | End: 2017-05-20 | Stop reason: HOSPADM

## 2017-05-20 RX ORDER — LEVOFLOXACIN 250 MG/1
500 TABLET ORAL DAILY
Qty: 14 TAB | Refills: 0 | Status: SHIPPED | OUTPATIENT
Start: 2017-05-20 | End: 2017-05-23

## 2017-05-20 RX ORDER — SODIUM CHLORIDE 9 MG/ML
50 INJECTION, SOLUTION INTRAVENOUS
Status: COMPLETED | OUTPATIENT
Start: 2017-05-20 | End: 2017-05-22

## 2017-05-20 RX ORDER — FENTANYL CITRATE 50 UG/ML
50 INJECTION, SOLUTION INTRAMUSCULAR; INTRAVENOUS AS NEEDED
Status: DISCONTINUED | OUTPATIENT
Start: 2017-05-20 | End: 2017-05-20 | Stop reason: HOSPADM

## 2017-05-20 RX ORDER — METRONIDAZOLE 500 MG/1
500 TABLET ORAL 3 TIMES DAILY
Qty: 21 TAB | Refills: 0 | Status: SHIPPED | OUTPATIENT
Start: 2017-05-20 | End: 2017-05-23

## 2017-05-20 RX ORDER — DOCUSATE CALCIUM 240 MG
240 CAPSULE ORAL 2 TIMES DAILY
Status: DISCONTINUED | OUTPATIENT
Start: 2017-05-20 | End: 2017-05-24 | Stop reason: HOSPADM

## 2017-05-20 RX ORDER — HYDROMORPHONE HYDROCHLORIDE 2 MG/ML
INJECTION, SOLUTION INTRAMUSCULAR; INTRAVENOUS; SUBCUTANEOUS AS NEEDED
Status: DISCONTINUED | OUTPATIENT
Start: 2017-05-20 | End: 2017-05-20 | Stop reason: HOSPADM

## 2017-05-20 RX ORDER — MIDAZOLAM HYDROCHLORIDE 1 MG/ML
0.5 INJECTION, SOLUTION INTRAMUSCULAR; INTRAVENOUS
Status: DISCONTINUED | OUTPATIENT
Start: 2017-05-20 | End: 2017-05-20 | Stop reason: HOSPADM

## 2017-05-20 RX ORDER — HYDROMORPHONE HYDROCHLORIDE 1 MG/ML
1 INJECTION, SOLUTION INTRAMUSCULAR; INTRAVENOUS; SUBCUTANEOUS
Status: DISCONTINUED | OUTPATIENT
Start: 2017-05-20 | End: 2017-05-22

## 2017-05-20 RX ORDER — ONDANSETRON 2 MG/ML
4 INJECTION INTRAMUSCULAR; INTRAVENOUS AS NEEDED
Status: DISCONTINUED | OUTPATIENT
Start: 2017-05-20 | End: 2017-05-20 | Stop reason: HOSPADM

## 2017-05-20 RX ORDER — BUPIVACAINE HYDROCHLORIDE AND EPINEPHRINE 2.5; 5 MG/ML; UG/ML
INJECTION, SOLUTION EPIDURAL; INFILTRATION; INTRACAUDAL; PERINEURAL AS NEEDED
Status: DISCONTINUED | OUTPATIENT
Start: 2017-05-20 | End: 2017-05-20 | Stop reason: HOSPADM

## 2017-05-20 RX ORDER — LIDOCAINE HYDROCHLORIDE 20 MG/ML
INJECTION, SOLUTION EPIDURAL; INFILTRATION; INTRACAUDAL; PERINEURAL AS NEEDED
Status: DISCONTINUED | OUTPATIENT
Start: 2017-05-20 | End: 2017-05-20 | Stop reason: HOSPADM

## 2017-05-20 RX ORDER — LIDOCAINE HYDROCHLORIDE 10 MG/ML
0.1 INJECTION, SOLUTION EPIDURAL; INFILTRATION; INTRACAUDAL; PERINEURAL AS NEEDED
Status: DISCONTINUED | OUTPATIENT
Start: 2017-05-20 | End: 2017-05-20 | Stop reason: HOSPADM

## 2017-05-20 RX ORDER — ALBUTEROL SULFATE 90 UG/1
2 AEROSOL, METERED RESPIRATORY (INHALATION)
Status: DISCONTINUED | OUTPATIENT
Start: 2017-05-20 | End: 2017-05-24 | Stop reason: HOSPADM

## 2017-05-20 RX ORDER — ALPRAZOLAM 0.5 MG/1
1 TABLET ORAL
Status: DISCONTINUED | OUTPATIENT
Start: 2017-05-20 | End: 2017-05-24 | Stop reason: HOSPADM

## 2017-05-20 RX ORDER — CEFTRIAXONE 250 MG/8ML
250 INJECTION, POWDER, FOR SOLUTION INTRAMUSCULAR; INTRAVENOUS
Status: COMPLETED | OUTPATIENT
Start: 2017-05-20 | End: 2017-05-20

## 2017-05-20 RX ORDER — SODIUM CHLORIDE, SODIUM LACTATE, POTASSIUM CHLORIDE, CALCIUM CHLORIDE 600; 310; 30; 20 MG/100ML; MG/100ML; MG/100ML; MG/100ML
100 INJECTION, SOLUTION INTRAVENOUS CONTINUOUS
Status: DISCONTINUED | OUTPATIENT
Start: 2017-05-20 | End: 2017-05-20 | Stop reason: HOSPADM

## 2017-05-20 RX ORDER — OXYCODONE AND ACETAMINOPHEN 5; 325 MG/1; MG/1
1 TABLET ORAL
Qty: 60 TAB | Refills: 0 | Status: SHIPPED | OUTPATIENT
Start: 2017-05-20 | End: 2017-08-22

## 2017-05-20 RX ORDER — SODIUM CHLORIDE 0.9 % (FLUSH) 0.9 %
5-10 SYRINGE (ML) INJECTION EVERY 8 HOURS
Status: DISCONTINUED | OUTPATIENT
Start: 2017-05-20 | End: 2017-05-24 | Stop reason: HOSPADM

## 2017-05-20 RX ORDER — CLONAZEPAM 0.5 MG/1
0.5 TABLET ORAL 2 TIMES DAILY
Status: DISCONTINUED | OUTPATIENT
Start: 2017-05-20 | End: 2017-05-24 | Stop reason: HOSPADM

## 2017-05-20 RX ORDER — HYDROMORPHONE HYDROCHLORIDE 1 MG/ML
0.5 INJECTION, SOLUTION INTRAMUSCULAR; INTRAVENOUS; SUBCUTANEOUS
Status: DISCONTINUED | OUTPATIENT
Start: 2017-05-20 | End: 2017-05-20 | Stop reason: HOSPADM

## 2017-05-20 RX ORDER — OXYCODONE AND ACETAMINOPHEN 5; 325 MG/1; MG/1
1 TABLET ORAL
Status: DISCONTINUED | OUTPATIENT
Start: 2017-05-20 | End: 2017-05-22

## 2017-05-20 RX ORDER — DIPHENHYDRAMINE HYDROCHLORIDE 50 MG/ML
12.5 INJECTION, SOLUTION INTRAMUSCULAR; INTRAVENOUS AS NEEDED
Status: DISCONTINUED | OUTPATIENT
Start: 2017-05-20 | End: 2017-05-20 | Stop reason: HOSPADM

## 2017-05-20 RX ORDER — MIDAZOLAM HYDROCHLORIDE 1 MG/ML
1 INJECTION, SOLUTION INTRAMUSCULAR; INTRAVENOUS AS NEEDED
Status: DISCONTINUED | OUTPATIENT
Start: 2017-05-20 | End: 2017-05-20 | Stop reason: HOSPADM

## 2017-05-20 RX ORDER — LEVOFLOXACIN 5 MG/ML
750 INJECTION, SOLUTION INTRAVENOUS
Status: COMPLETED | OUTPATIENT
Start: 2017-05-20 | End: 2017-05-20

## 2017-05-20 RX ORDER — FENTANYL CITRATE 50 UG/ML
INJECTION, SOLUTION INTRAMUSCULAR; INTRAVENOUS AS NEEDED
Status: DISCONTINUED | OUTPATIENT
Start: 2017-05-20 | End: 2017-05-20 | Stop reason: HOSPADM

## 2017-05-20 RX ADMIN — METRONIDAZOLE 500 MG: 500 INJECTION, SOLUTION INTRAVENOUS at 09:42

## 2017-05-20 RX ADMIN — LEVOFLOXACIN 750 MG: 5 INJECTION, SOLUTION INTRAVENOUS at 11:29

## 2017-05-20 RX ADMIN — HYDROMORPHONE HYDROCHLORIDE 1 MG: 1 INJECTION, SOLUTION INTRAMUSCULAR; INTRAVENOUS; SUBCUTANEOUS at 09:42

## 2017-05-20 RX ADMIN — METRONIDAZOLE 500 MG: 500 INJECTION, SOLUTION INTRAVENOUS at 13:53

## 2017-05-20 RX ADMIN — HYDROMORPHONE HYDROCHLORIDE 1 MG: 1 INJECTION, SOLUTION INTRAMUSCULAR; INTRAVENOUS; SUBCUTANEOUS at 07:52

## 2017-05-20 RX ADMIN — DEXAMETHASONE SODIUM PHOSPHATE 8 MG: 4 INJECTION, SOLUTION INTRA-ARTICULAR; INTRALESIONAL; INTRAMUSCULAR; INTRAVENOUS; SOFT TISSUE at 11:42

## 2017-05-20 RX ADMIN — PROPOFOL 150 MG: 10 INJECTION, EMULSION INTRAVENOUS at 11:36

## 2017-05-20 RX ADMIN — ALPRAZOLAM 1 MG: 0.5 TABLET ORAL at 21:13

## 2017-05-20 RX ADMIN — LIDOCAINE HYDROCHLORIDE 100 MG: 20 INJECTION, SOLUTION EPIDURAL; INFILTRATION; INTRACAUDAL; PERINEURAL at 11:36

## 2017-05-20 RX ADMIN — SODIUM CHLORIDE 50 ML/HR: 900 INJECTION, SOLUTION INTRAVENOUS at 08:55

## 2017-05-20 RX ADMIN — HYDROMORPHONE HYDROCHLORIDE 1 MG: 2 INJECTION, SOLUTION INTRAMUSCULAR; INTRAVENOUS; SUBCUTANEOUS at 11:39

## 2017-05-20 RX ADMIN — ONDANSETRON 4 MG: 2 INJECTION INTRAMUSCULAR; INTRAVENOUS at 11:58

## 2017-05-20 RX ADMIN — LIDOCAINE HYDROCHLORIDE 2.1 ML: 10 INJECTION, SOLUTION EPIDURAL; INFILTRATION; INTRACAUDAL; PERINEURAL at 11:10

## 2017-05-20 RX ADMIN — CEFTRIAXONE SODIUM 250 MG: 250 INJECTION, POWDER, FOR SOLUTION INTRAMUSCULAR; INTRAVENOUS at 11:10

## 2017-05-20 RX ADMIN — FENTANYL CITRATE 25 MCG: 50 INJECTION, SOLUTION INTRAMUSCULAR; INTRAVENOUS at 12:40

## 2017-05-20 RX ADMIN — OXYCODONE HYDROCHLORIDE AND ACETAMINOPHEN 1 TABLET: 5; 325 TABLET ORAL at 17:32

## 2017-05-20 RX ADMIN — FENTANYL CITRATE 25 MCG: 50 INJECTION, SOLUTION INTRAMUSCULAR; INTRAVENOUS at 13:06

## 2017-05-20 RX ADMIN — SODIUM CHLORIDE, POTASSIUM CHLORIDE, SODIUM LACTATE AND CALCIUM CHLORIDE: 600; 310; 30; 20 INJECTION, SOLUTION INTRAVENOUS at 11:29

## 2017-05-20 RX ADMIN — DOCUSATE CALCIUM 240 MG: 240 CAPSULE, LIQUID FILLED ORAL at 17:04

## 2017-05-20 RX ADMIN — METRONIDAZOLE 500 MG: 500 INJECTION, SOLUTION INTRAVENOUS at 20:30

## 2017-05-20 RX ADMIN — Medication 10 ML: at 13:42

## 2017-05-20 RX ADMIN — SODIUM CHLORIDE 50 ML/HR: 900 INJECTION, SOLUTION INTRAVENOUS at 12:39

## 2017-05-20 RX ADMIN — FENTANYL CITRATE 100 MCG: 50 INJECTION, SOLUTION INTRAMUSCULAR; INTRAVENOUS at 11:36

## 2017-05-20 RX ADMIN — Medication 10 ML: at 21:12

## 2017-05-20 RX ADMIN — SUCCINYLCHOLINE CHLORIDE 200 MG: 20 INJECTION INTRAMUSCULAR; INTRAVENOUS at 11:36

## 2017-05-20 RX ADMIN — IOPAMIDOL 100 ML: 755 INJECTION, SOLUTION INTRAVENOUS at 08:55

## 2017-05-20 RX ADMIN — ACETAMINOPHEN 1000 MG: 10 INJECTION, SOLUTION INTRAVENOUS at 11:56

## 2017-05-20 RX ADMIN — SODIUM CHLORIDE, POTASSIUM CHLORIDE, SODIUM LACTATE AND CALCIUM CHLORIDE: 600; 310; 30; 20 INJECTION, SOLUTION INTRAVENOUS at 12:09

## 2017-05-20 RX ADMIN — CLONAZEPAM 0.5 MG: 0.5 TABLET ORAL at 17:06

## 2017-05-20 RX ADMIN — MIDAZOLAM HYDROCHLORIDE 2 MG: 1 INJECTION, SOLUTION INTRAMUSCULAR; INTRAVENOUS at 11:32

## 2017-05-20 RX ADMIN — HYDROMORPHONE HYDROCHLORIDE 1 MG: 1 INJECTION, SOLUTION INTRAMUSCULAR; INTRAVENOUS; SUBCUTANEOUS at 15:28

## 2017-05-20 RX ADMIN — SODIUM CHLORIDE 1000 ML: 900 INJECTION, SOLUTION INTRAVENOUS at 07:53

## 2017-05-20 RX ADMIN — HYDROMORPHONE HYDROCHLORIDE 1 MG: 2 INJECTION, SOLUTION INTRAMUSCULAR; INTRAVENOUS; SUBCUTANEOUS at 12:11

## 2017-05-20 RX ADMIN — ONDANSETRON HYDROCHLORIDE 4 MG: 2 INJECTION, SOLUTION INTRAMUSCULAR; INTRAVENOUS at 07:52

## 2017-05-20 RX ADMIN — Medication 10 ML: at 08:55

## 2017-05-20 RX ADMIN — HYDROMORPHONE HYDROCHLORIDE 1 MG: 1 INJECTION, SOLUTION INTRAMUSCULAR; INTRAVENOUS; SUBCUTANEOUS at 21:12

## 2017-05-20 NOTE — DISCHARGE INSTRUCTIONS
Loreto Bailon MD, FACS  Enrique Don MD, FACS  Maikel Bain. Prem Martinez MD, [address] [facility] Dave Reyez MD, [address] [facility] Jason Naik MD, FACS  Angel Obregon. MD Chantell Martinez MD    [facility] Discharge Instructions for Ambulatory 23-Hour Surgery Patients    1. Advance to high fiber diet as tolerated. 2. Take Metamucil/Citrucel 1 teaspoon mixed in a glass of water in AM and PM.  3. Remove dressing at 6PM.  4. Take 2 sitz baths today (warm water baths for 15-20 minutes). Begin four (4) times a day the day after surgery. 5. Apply Nupercainal Ointment (does not need a prescription) to the anal area after sitz baths, place a dry 4x4 gauze over the are between the buttocks. 6. Take pain medication as prescribed. (NO DRIVING WHILE ON PAIN MEDICATION). 7. No lifting any objects weighing more than 40 pounds. 8. No sitting more than 45 minutes without standing, walking, or lying down. 9. You may walk as desired. 10.  Please schedule an appointment in the office within 10-14 days. Call ahead to schedule your appointment [phone]. 11.  Call [facility] [phone] if you have any problems or questions after   hours. 12.  Expect slight bright red blood up to four (4) weeks from surgery. 13.  Stitches are the dissolving type - they do not need removal in the office. 14.  If no bowel movement by 5/22/2017, take 30cc (1 tablespoon) of Milk of Magnesia. Repeat if no results. If still no bowel movement the next day, then call the office.

## 2017-05-20 NOTE — ED NOTES
Pt doing much better she says. Pt aware of diagnosis.  Pt reports pain is 7 but pt is no longer hyperventilating./

## 2017-05-20 NOTE — ANESTHESIA POSTPROCEDURE EVALUATION
Post-Anesthesia Evaluation and Assessment    Patient: Janie Hanson MRN: [id number]  SSN: [id number]    YOB: 1980  Age: 39 y.o. Sex: female       Cardiovascular Function/Vital Signs  Visit Vitals    /60    Pulse (!) 108    Temp (!) 38.7 °C (101.7 °F)    Resp 17    SpO2 97%       Patient is status post general anesthesia for Procedure(s):  [address] Nausea/Vomiting: None    Postoperative hydration reviewed and adequate. Pain:  Pain Scale 1: Numeric (0 - 10) (05/20/17 1246)  Pain Intensity 1: 5 (05/20/17 1246)   Managed    Neurological Status:   Neuro  Neurologic State: Alert;Drowsy (05/20/17 1248)  Orientation Level: Oriented to person;Oriented to place;Oriented to situation (05/20/17 1120)  Cognition: Follows commands (05/20/17 1120)  Speech: Clear (05/20/17 1120)  LUE Motor Response: Purposeful (05/20/17 1120)  LLE Motor Response: Purposeful (05/20/17 1120)  RUE Motor Response: Purposeful (05/20/17 1120)  RLE Motor Response: Purposeful (05/20/17 1120)   At baseline    Mental Status and Level of Consciousness: Arousable    Pulmonary Status:   O2 Device: Nasal cannula (05/20/17 1245)   Adequate oxygenation and airway patent    Complications related to anesthesia: None    Post-anesthesia assessment completed.  No concerns    Signed By: Mirela Sow MD     May 20, 2017

## 2017-05-20 NOTE — ROUTINE PROCESS
TRANSFER - OUT REPORT:    Verbal report given to Jt Plascencia  being transferred to 2142 for routine post - op       Report consisted of patients Situation, Background, Assessment and   Recommendations(SBAR). Information from the following report(s) SBAR, Intake/Output, MAR and Accordion was reviewed with the receiving nurse. Opportunity for questions and clarification was provided.       Patient transported with:   O2 @ 2 liters  Registered Nurse

## 2017-05-20 NOTE — PROGRESS NOTES
Primary Nurse Ravi Allison and Leanna Parikh RN performed a dual skin assessment on this patient Impairment noted- see wound doc flow sheet  Nuno score is 20   multiple tattoos   Two scratches on RLE   Blister on back to left ankle (dried)

## 2017-05-20 NOTE — PROGRESS NOTES
TRANSFER - IN REPORT:    Verbal report received from Kashif(name) on Janie Hanson  being received from PACU(unit) for routine progression of care      Report consisted of patients Situation, Background, Assessment and   Recommendations(SBAR). Information from the following report(s) SBAR was reviewed with the receiving nurse. Opportunity for questions and clarification was provided. Assessment completed upon patients arrival to unit and care assumed.

## 2017-05-20 NOTE — IP AVS SNAPSHOT
[address] [facility] 
[phone] Patient: Ariel Aden MRN: [id number] NYD:1/50/[birth year]35 You are allergic to the following Allergen Reactions Other Food Itching Fresh fruit, cannot be specific Codeine Hives Allergic to tylenol with codeine combo Tylenol-Codeine #2 Hives Nuts (Tree Nut) Itching Recent Documentation Weight BMI OB Status Smoking Status 72.1 kg 26.46 kg/m2 Hysterectomy Current Some Day Smoker Unresulted Labs Order Current Status HEPATITIS C AB In process HIV 1/2 AG/AB, 4TH GENERATION,W RFLX CONFIRM In process CULTURE, BLOOD Preliminary result Emergency Contacts Name Discharge Info Relation Home Work Mobile Олег Burkett  Father [15] [phone] Travis Camp  Spouse [3] [phone] Deidra Dimas N/A  AT THIS TIME [6] Sister [23]   [phone] About your hospitalization You were admitted on:  May 20, 2017 You last received care in the:  [facility] 2 GENERAL SURGERY You were discharged on:  May 24, 2017 Unit phone number:  [phone] Why you were hospitalized Your primary diagnosis was:  Not on File Your diagnoses also included:  Perirectal Abscess Providers Seen During Your Hospitalizations Provider Role Specialty Primary office phone Tati Flynn MD Attending Provider Emergency Medicine [phone] Williams Rojas MD Attending Provider Colon and Rectal Surgery [phone] Your Primary Care Physician (PCP) Primary Care Physician Office Phone Office Fax [address], [address] [phone] Follow-up Information Follow up With Details Comments Contact Info Yasmany Martinez MD   [address] [place] [address] [phone] Current Discharge Medication List  
  
 START taking these medications Dose & Instructions Dispensing Information Comments Morning Noon Evening Bedtime  
 amoxicillin-clavulanate 875-125 mg per tablet Commonly known as:  AUGMENTIN Your last dose was: Your next dose is:    
   
   
 Dose:  1 Tab Take 1 Tab by mouth two (2) times a day for 7 days. Quantity:  14 Tab Refills:  0  
     
   
   
   
  
 oxyCODONE-acetaminophen 5-325 mg per tablet Commonly known as:  PERCOCET Your last dose was: Your next dose is:    
   
   
 Dose:  1 Tab Take 1 Tab by mouth every four (4) hours as needed for Pain. Max Daily Amount: 6 Tabs. Quantity:  60 Tab Refills:  0 CONTINUE these medications which have NOT CHANGED Dose & Instructions Dispensing Information Comments Morning Noon Evening Bedtime  
 albuterol 90 mcg/actuation inhaler Commonly known as:  VENTOLIN HFA Your last dose was: Your next dose is:    
   
   
 Dose:  2 Puff Take 2 Puffs by inhalation every six (6) hours as needed for Wheezing. Quantity:  1 Inhaler Refills:  0 ALPRAZolam 1 mg tablet Commonly known as:  Anusha Alyx Your last dose was: Your next dose is:    
   
   
 Dose:  1-2 mg Take 1-2 mg by mouth three (3) times daily as needed. Refills:  2  
     
   
   
   
  
 benzocaine-resorcinol-aloe-vit E-vit A&D 20-3 % topical cream  
Commonly known as:  VAGISIL Your last dose was: Your next dose is:    
   
   
 Dose:  3 g Apply 3 g to affected area three (3) times daily. Quantity:  28 g Refills:  0  
     
   
   
   
  
 clonazePAM 0.5 mg tablet Commonly known as:  Roxanne Catano Your last dose was: Your next dose is:    
   
   
 Dose:  0.5 mg Take 0.5 mg by mouth two (2) times a day. Refills:  0  
     
   
   
   
  
 docusate calcium 240 mg capsule Commonly known as:  Marty Lamer Your last dose was: Your next dose is:    
   
   
 Dose:  240 mg Take 1 Cap by mouth two (2) times a day for 90 days. Quantity:  30 Cap Refills:  0 LEXAPRO 20 mg tablet Generic drug:  escitalopram oxalate Your last dose was: Your next dose is:    
   
   
 Dose:  20 mg Take 20 mg by mouth daily. Refills:  0  
     
   
   
   
  
 lidocaine 2 % Jelp jelly Commonly known as:  URO-JET Your last dose was: Your next dose is:    
   
   
 Apply to affected area Quantity:  20 mL Refills:  0  
     
   
   
   
  
 polyethylene glycol 17 gram/dose powder Commonly known as:  Nanci Sandy Your last dose was: Your next dose is:    
   
   
 Dose:  17 g Take 17 g by mouth daily. 1 tablespoon with 8 oz of water daily Quantity:  119 g Refills:  0  
     
   
   
   
  
 VYVANSE 50 mg Cap Generic drug:  Lisdexamfetamine Your last dose was: Your next dose is: Take by mouth daily. Refills:  0 Where to Get Your Medications Information on where to get these meds will be given to you by the nurse or doctor. ! Ask your nurse or doctor about these medications  
  amoxicillin-clavulanate 875-125 mg per tablet  
 oxyCODONE-acetaminophen 5-325 mg per tablet Discharge Instructions Bari Joseph MD, FACS Enrique WALSH. Neva Yanez MD, FACS Jorge WALSH. Abiel Cortes MD, FACS Guillermo Gaytan. Jenna Schilder, MD, FACS Deja Luna MD, FACS John Roman. MD Denver Altamirano MD 
 
[facility] Discharge Instructions for Ambulatory 23-Hour Surgery Patients 1. Advance to high fiber diet as tolerated. 2. Take Metamucil/Citrucel 1 teaspoon mixed in a glass of water in AM and PM. 
3. Remove dressing at 6PM. 4. Take 2 sitz baths today (warm water baths for 15-20 minutes). Begin four (4) times a day the day after surgery. 5. Apply Nupercainal Ointment (does not need a prescription) to the anal area after sitz baths, place a dry 4x4 gauze over the are between the buttocks. 6. Take pain medication as prescribed. (NO DRIVING WHILE ON PAIN MEDICATION). 7. No lifting any objects weighing more than 40 pounds. 8. No sitting more than 45 minutes without standing, walking, or lying down. 9. You may walk as desired. 10.  Please schedule an appointment in the office within 10-14 days. Call ahead to schedule your appointment [phone]. 11.  Call Exchange [phone] if you have any problems or questions after   hours. 12.  Expect slight bright red blood up to four (4) weeks from surgery. 15.  Stitches are the dissolving type  they do not need removal in the office. 14.  If no bowel movement by 5/22/2017, take 30cc (1 tablespoon) of Milk of Magnesia. Repeat if no results. If still no bowel movement the next day, then call the office. Discharge Orders None Introducing [facility]! [employer] introduces [employer]Sync patient portal. Now you can access parts of your medical record, email your doctor's office, and request medication refills online. 1. In your internet browser, go to [email or web address] 2. Click on the First Time User? Click Here link in the Sign In box. You will see the New Member Sign Up page. 3. Enter your MoSync Access Code exactly as it appears below. You will not need to use this code after youve completed the sign-up process. If you do not sign up before the expiration date, you must request a new code. · MoSync Access Code: [email or web address] Expires: 7/29/2017  2:13 PM 
 
4. Enter the last four digits of your Social Security Number (xxxx) and Date of Birth (mm/dd/yyyy) as indicated and click Submit. You will be taken to the next sign-up page. 5. Create a MoSync ID.  This will be your MoSync login ID and cannot be changed, so think of one that is secure and easy to remember. 6. Create a [employer] password. You can change your password at any time. 7. Enter your Password Reset Question and Answer. This can be used at a later time if you forget your password. 8. Enter your e-mail address. You will receive e-mail notification when new information is available in [address] 9. Click Sign Up. You can now view and download portions of your medical record. 10. Click the Download Summary menu link to download a portable copy of your medical information. If you have questions, please visit the Frequently Asked Questions section of the [employer] website. Remember, [employer] is NOT to be used for urgent needs. For medical emergencies, dial 911. Now available from your iPhone and Android! General Information Please provide this summary of care documentation to your next provider. Patient Signature:  ____________________________________________________________ Date:  ____________________________________________________________  
  
Ivelisse Brandee Provider Signature:  ____________________________________________________________ Date:  ____________________________________________________________

## 2017-05-20 NOTE — ANESTHESIA PREPROCEDURE EVALUATION
Anesthetic History   No history of anesthetic complications            Review of Systems / Medical History  Patient summary reviewed, nursing notes reviewed and pertinent labs reviewed    Pulmonary          Smoker  Asthma        Neuro/Psych         Psychiatric history     Cardiovascular  Within defined limits                Exercise tolerance: >4 METS     GI/Hepatic/Renal  Within defined limits              Endo/Other  Within defined limits           Other Findings   Comments: Chronic pain  Generalized anxiety disorder           Physical Exam    Airway  Mallampati: II  TM Distance: 4 - 6 cm  Neck ROM: normal range of motion   Mouth opening: Normal     Cardiovascular  Regular rate and rhythm,  S1 and S2 normal,  no murmur, click, rub, or gallop             Dental         Pulmonary  Breath sounds clear to auscultation               Abdominal  GI exam deferred       Other Findings            Anesthetic Plan    ASA: 2, emergent  Anesthesia type: general          Induction: Intravenous  Anesthetic plan and risks discussed with: Patient

## 2017-05-20 NOTE — ROUTINE PROCESS
TRANSFER - OUT REPORT:    Verbal report given to Virginia(name) on Maximino Marquez  being transferred to OR(unit) for routine progression of care       Report consisted of patients Situation, Background, Assessment and   Recommendations(SBAR). Information from the following report(s) SBAR, Kardex, ED Summary, Intake/Output, MAR, Recent Results, Med Rec Status and Cardiac Rhythm NSR was reviewed with the receiving nurse. Lines:   Peripheral IV 05/20/17 Right Antecubital (Active)   Site Assessment Clean, dry, & intact 5/20/2017  7:48 AM   Phlebitis Assessment 0 5/20/2017  7:48 AM   Infiltration Assessment 0 5/20/2017  7:48 AM   Dressing Status Clean, dry, & intact 5/20/2017  7:48 AM   Dressing Type Transparent 5/20/2017  7:48 AM   Hub Color/Line Status Pink;Flushed 5/20/2017  7:48 AM   Action Taken Blood drawn 5/20/2017  7:48 AM        Opportunity for questions and clarification was provided.       Patient transported with:   Registered Nurse iv pump

## 2017-05-20 NOTE — BRIEF OP NOTE
BRIEF OPERATIVE NOTE    Date of Procedure: 5/20/2017   Preoperative Diagnosis: perirectal abscess  Postoperative Diagnosis: * No post-op diagnosis entered *    Procedure(s):  PERIRECTAL ABSCESS EXCISION  Surgeon(s) and Role:     * Marisol Castro MD - Primary         Assistant Staff:       Surgical Staff:  Circ-1: Ankita Rose RN  Scrub Tech-1: Lorena Thurman  Surg Asst-1: Patricia Roca  Float Staff: Sheila Ferraro RN  Event Time In   Incision Start 1152   Incision Close      Anesthesia: Other   Estimated Blood Loss: 5cc  Specimens:   ID Type Source Tests Collected by Time Destination   1 : PERIRECTAL ABSCESS Abscess Rectal  Marisol Castro MD 5/20/2017 1153 Microbiology      Findings: posterior perirectal abscess   Complications: none apparent  Implants: * No implants in log *

## 2017-05-20 NOTE — ED PROVIDER NOTES
HPI Comments: Magda Christianson is a 39 y.o. female who presents ambulatory to ED [facility] ED with CC of constant, progressively worsening rectal pain x ~3 days. Pt reports she was evaluated at ED [facility] ED for similar symptoms yesterday (5/19/17), and states her pain has become significantly worse today. She also reports onset of fever ~101.6, chills, back pain, and B/L hip pain last night. Pt states she also had episode of vomiting x1 last night. She notes she had anal intercourse ~1 week ago. She denies factors which improve or exacerbate her pain. Pt notes last BM ~1 week ago. She specifically denies diarrhea and hematochezia. PCP: Kane Schneider MD    There are no other complaints, changes, or physical findings at this time.'    The history is provided by the patient.         Past Medical History:   Diagnosis Date    ADHD (attention deficit hyperactivity disorder)     Anxiety     Anxiety     Asthma     CHILDHOOD    Chronic back pain     Chronic pain     DISC ISSUES IN BACK    Psychiatric disorder     ANXIETY       Past Surgical History:   Procedure Laterality Date    BREAST SURGERY PROCEDURE UNLISTED  2002    reduction    HX BREAST REDUCTION      HX CYST REMOVAL Right     ganglion cyst removed from right wrist    HX GYN      BTL    HX GYN      D&C X 2    HX GYN      Hysterectomy    HX OTHER SURGICAL  5/2013    cyst removed from right hand    HX TONSIL AND ADENOIDECTOMY      HX TONSILLECTOMY      HX WISDOM TEETH EXTRACTION           Family History:   Problem Relation Age of Onset    Anxiety Mother     Hypertension Mother     COPD Mother     Arthritis-osteo Mother     Psychiatric Disorder Mother     Hypertension Father     Cancer Brother     Schizophrenia Maternal Aunt     Heart Disease Maternal Grandfather     Stroke Maternal Grandfather     Cancer Paternal Grandmother     Anesth Problems Neg Hx        Social History     Social History    Marital status: LEGALLY      Spouse name: N/A  Number of children: N/A    Years of education: N/A     Occupational History    Not on file. Social History Main Topics    Smoking status: Current Some Day Smoker     Packs/day: 0.25     Years: 19.00    Smokeless tobacco: Current User      Comment: smokes vapor cig     Alcohol use 0.0 oz/week     0 Standard drinks or equivalent per week      Comment: Socially.  Drug use: No    Sexual activity: Not on file     Other Topics Concern    Not on file     Social History Narrative         ALLERGIES: Other food; Codeine; Tylenol-codeine #2; and Nuts [tree nut]    Review of Systems   Constitutional: Positive for chills and fever. Negative for diaphoresis and unexpected weight change. HENT: Negative for rhinorrhea and sore throat. Eyes: Negative for pain. Respiratory: Negative for shortness of breath, wheezing and stridor. Cardiovascular: Negative for chest pain and leg swelling. Gastrointestinal: Positive for constipation, rectal pain and vomiting (x1). Negative for abdominal pain, blood in stool, diarrhea and nausea. Genitourinary: Negative for difficulty urinating, dysuria and flank pain. Musculoskeletal: Positive for arthralgias (B/L hips) and back pain. Negative for neck stiffness. Skin: Negative for rash. Neurological: Negative for seizures, syncope, weakness, light-headedness and headaches. Psychiatric/Behavioral: Negative for confusion. Patient Vitals for the past 12 hrs:   Temp Pulse Resp BP SpO2   05/20/17 0820 - 97 20 106/62 100 %   05/20/17 0759 - 100 29 96/53 100 %   05/20/17 0720 99.3 °F (37.4 °C) (!) 111 18 109/48 100 %            Physical Exam   Constitutional: She is oriented to person, place, and time. She appears well-developed and well-nourished. She appears distressed (moderate). Tearful   HENT:   Nose: Nose normal.   Mouth/Throat: Oropharynx is clear and moist. No oropharyngeal exudate.    Eyes: Conjunctivae and EOM are normal. Pupils are equal, round, and reactive to light. Right eye exhibits no discharge. Left eye exhibits no discharge. No scleral icterus. Neck: Normal range of motion. Neck supple. No JVD present. Cardiovascular: Regular rhythm, normal heart sounds and intact distal pulses. Tachycardia present. No murmur heard. Pulmonary/Chest: Effort normal and breath sounds normal. No stridor. No respiratory distress. She has no wheezes. She has no rales. Abdominal: Soft. Bowel sounds are normal. She exhibits no distension. There is no tenderness. There is no rebound and no guarding. Genitourinary: Rectal exam shows no external hemorrhoid and no fissure. Musculoskeletal: She exhibits no edema or tenderness. Neurological: She is alert and oriented to person, place, and time. Skin: Skin is warm and dry. No rash noted. She is not diaphoretic. Psychiatric: She has a normal mood and affect. Nursing note and vitals reviewed. MDM  Number of Diagnoses or Management Options  Perianal abscess:      Amount and/or Complexity of Data Reviewed  Clinical lab tests: ordered and reviewed  Tests in the radiology section of CPT®: ordered and reviewed  Review and summarize past medical records: yes  Discuss the patient with other providers: yes (Hospitalist; General Surgery; Colorectal Surgery)  Independent visualization of images, tracings, or specimens: yes      ED Course       Procedures    Procedure Note - External Rectal Exam:   8:43 AM  Performed by: Kehinde Rosario MD  Chaperoned by: Lis Jacobson  Rectal exam performed. Findings noted in physical exam.  The procedure took 1-15 minutes, and pt tolerated well. Consult Note:  10:41 AM  Kehinde Rosario MD spoke with Rowena Doe MD  Specialty: Colorectal Surgery  Discussed pts hx, disposition, and available diagnostic and imaging results. Reviewed care plans. Consultant agrees with plans as outlined. He will take pt to OR to drain abscess.     LABORATORY TESTS:  Recent Results (from the past 12 hour(s))   CBC WITH AUTOMATED DIFF    Collection Time: 05/20/17  7:50 AM   Result Value Ref Range    WBC 16.9 (H) 3.6 - 11.0 K/uL    RBC 3.61 (L) 3.80 - 5.20 M/uL    HGB 12.0 11.5 - 16.0 g/dL    HCT 34.0 (L) 35.0 - 47.0 %    MCV 94.2 80.0 - 99.0 FL    MCH 33.2 26.0 - 34.0 PG    MCHC 35.3 30.0 - 36.5 g/dL    RDW 13.4 11.5 - 14.5 %    PLATELET 729 (L) 449 - 400 K/uL    NEUTROPHILS 93 (H) 32 - 75 %    LYMPHOCYTES 2 (L) 12 - 49 %    MONOCYTES 5 5 - 13 %    EOSINOPHILS 0 0 - 7 %    BASOPHILS 0 0 - 1 %    ABS. NEUTROPHILS 15.8 (H) 1.8 - 8.0 K/UL    ABS. LYMPHOCYTES 0.3 (L) 0.8 - 3.5 K/UL    ABS. MONOCYTES 0.8 0.0 - 1.0 K/UL    ABS. EOSINOPHILS 0.0 0.0 - 0.4 K/UL    ABS. BASOPHILS 0.0 0.0 - 0.1 K/UL    RBC COMMENTS NORMOCYTIC, NORMOCHROMIC      DF SMEAR SCANNED     METABOLIC PANEL, COMPREHENSIVE    Collection Time: 05/20/17  7:50 AM   Result Value Ref Range    Sodium 139 136 - 145 mmol/L    Potassium 3.3 (L) 3.5 - 5.1 mmol/L    Chloride 105 97 - 108 mmol/L    CO2 29 21 - 32 mmol/L    Anion gap 5 5 - 15 mmol/L    Glucose 96 65 - 100 mg/dL    BUN 10 6 - 20 MG/DL    Creatinine 0.87 0.55 - 1.02 MG/DL    BUN/Creatinine ratio 11 (L) 12 - 20      GFR est AA >60 >60 ml/min/1.73m2    GFR est non-AA >60 >60 ml/min/1.73m2    Calcium 8.8 8.5 - 10.1 MG/DL    Bilirubin, total 0.7 0.2 - 1.0 MG/DL    ALT (SGPT) 20 12 - 78 U/L    AST (SGOT) 15 15 - 37 U/L    Alk.  phosphatase 55 45 - 117 U/L    Protein, total 6.6 6.4 - 8.2 g/dL    Albumin 3.4 (L) 3.5 - 5.0 g/dL    Globulin 3.2 2.0 - 4.0 g/dL    A-G Ratio 1.1 1.1 - 2.2     LACTIC ACID, PLASMA    Collection Time: 05/20/17  7:51 AM   Result Value Ref Range    Lactic acid 1.9 0.4 - 2.0 MMOL/L   URINALYSIS W/ REFLEX CULTURE    Collection Time: 05/20/17  9:50 AM   Result Value Ref Range    Color YELLOW/STRAW      Appearance CLEAR CLEAR      Specific gravity >1.030 (H) 1.003 - 1.030    pH (UA) 7.0 5.0 - 8.0      Protein NEGATIVE  NEG mg/dL    Glucose NEGATIVE  NEG mg/dL    Ketone 15 (A) NEG mg/dL    Bilirubin NEGATIVE  NEG      Blood NEGATIVE  NEG      Urobilinogen 1.0 0.2 - 1.0 EU/dL    Nitrites NEGATIVE  NEG      Leukocyte Esterase NEGATIVE  NEG      WBC PENDING /hpf    RBC PENDING /hpf    Epithelial cells PENDING /lpf    Bacteria PENDING /hpf    UA:UC IF INDICATED PENDING    HCG URINE, QL    Collection Time: 05/20/17  9:50 AM   Result Value Ref Range    HCG urine, Ql. NEGATIVE  NEG         IMAGING RESULTS:  CT Results  (Last 48 hours)               05/20/17 0856  CT ABD PELV W CONT Final result    Impression:  IMPRESSION:   There is concern for annual inflammation and a abscess of the posterior anal   wall. Clinical correlation recommended. Narrative:  INDICATION: rectal pain, fever       COMPARISON: Abdomen-pelvis CT June 28, 2015, abdomen ultrasound April 16, 2016       TECHNIQUE:    Following the uneventful intravenous administration of 100 cc Isovue-370, thin   axial images were obtained through the abdomen and pelvis. Coronal and sagittal   reconstructions were generated. Oral contrast was not, per order of the ordering   physician, administered. CT dose reduction was achieved through use of a   standardized protocol tailored for this examination and automatic exposure   control for dose modulation. The lack of oral contrast material substantially diminishes the capacity of CT   to evaluate the bowel. FINDINGS:    LUNG BASES: Clear. INCIDENTALLY IMAGED HEART AND MEDIASTINUM: Unremarkable. LIVER: No mass or biliary dilatation. GALLBLADDER: Unremarkable. SPLEEN: No mass. PANCREAS: No mass or ductal dilatation. ADRENALS: Unremarkable. KIDNEYS: No mass, calculus, or hydronephrosis. STOMACH: Nondistended. SMALL BOWEL: Nondistended. COLON: Nondistended. APPENDIX: Not identified. PERITONEUM: No free intraperitoneal air or fluid demonstrated. RETROPERITONEUM: No lymphadenopathy or aortic aneurysm. REPRODUCTIVE ORGANS: Status post hysterectomy. URINARY BLADDER: No mass or calculus. BONES: Normal.   ADDITIONAL COMMENTS: Peroneal assessment shows interval mural thickening of the   anus with hypoattenuating area posterior to the anus measuring 2.4 x 1.8 cm   transverse and 2.5 cm craniocaudal. Finding is suspicious for an abscess. No   perianal fat stranding demonstrated. MEDICATIONS GIVEN:  Medications   levoFLOXacin (LEVAQUIN) 750 mg in D5W IVPB (not administered)   metroNIDAZOLE (FLAGYL) IVPB premix 500 mg (500 mg IntraVENous New Bag 5/20/17 0942)   cefTRIAXone (ROCEPHIN) injection 250 mg (not administered)   sodium chloride 0.9 % bolus infusion 1,000 mL (0 mL IntraVENous IV Completed 5/20/17 0948)   HYDROmorphone (PF) (DILAUDID) injection 1 mg (1 mg IntraVENous Given 5/20/17 0752)   ondansetron (ZOFRAN) injection 4 mg (4 mg IntraVENous Given 5/20/17 0752)   0.9% sodium chloride infusion (50 mL/hr IntraVENous New Bag 5/20/17 0855)   iopamidol (ISOVUE-370) 76 % injection 100 mL (100 mL IntraVENous Given 5/20/17 0855)   sodium chloride (NS) flush 10 mL (10 mL IntraVENous Given 5/20/17 0855)   HYDROmorphone (PF) (DILAUDID) injection 1 mg (1 mg IntraVENous Given 5/20/17 0942)       IMPRESSION:  1. Perianal abscess        PLAN:  1. Admit to Colorectal Surgery    ADMIT NOTE:  10:41 AM  The patient is being admitted to the hospital by Dr. Juanjose Contreras. The results of their tests and reasons for their admission have been discussed with the patient and/or available family. They convey agreement and understanding for the need to be admitted and for their admission diagnosis. This note is prepared by Ceferino Arias, acting as Scribe for Amy Murillo MD.    Amy Murillo MD: The scribe's documentation has been prepared under my direction and personally reviewed by me in its entirety. I confirm that the note above accurately reflects all work, treatment, procedures, and medical decision making performed by me.

## 2017-05-20 NOTE — H&P
Colon and Rectal Surgery History and Physical    Subjective:      Katie Olvera is a 39 y.o. female who has been having anal pain for the past few days. Apparently had anal sex 1 week ago. Had an exam in the ER yesterday. Ct today shows a perianal abscess. She has no prior history of this. Patient Active Problem List    Diagnosis Date Noted    Adjustment disorder with mixed anxiety and depressed mood 03/25/2016    ADHD (attention deficit hyperactivity disorder), combined type 03/25/2016    Female pelvic hematoma 06/28/2015    Post-operative hemorrhage 06/28/2015    Bilateral edema of lower extremity 06/01/2015    Smoker 05/25/2015    Generalized anxiety disorder 05/19/2015     Past Medical History:   Diagnosis Date    ADHD (attention deficit hyperactivity disorder)     Anxiety     Anxiety     Asthma     CHILDHOOD    Chronic back pain     Chronic pain     DISC ISSUES IN BACK    Psychiatric disorder     ANXIETY      Past Surgical History:   Procedure Laterality Date    BREAST SURGERY PROCEDURE UNLISTED  2002    reduction    HX BREAST REDUCTION      HX CYST REMOVAL Right     ganglion cyst removed from right wrist    HX GYN      BTL    HX GYN      D&C X 2    HX GYN  2012    Hysterectomy    HX OTHER SURGICAL  5/2013    cyst removed from right hand    HX TONSIL AND ADENOIDECTOMY      HX TONSILLECTOMY      HX WISDOM TEETH EXTRACTION        Social History   Substance Use Topics    Smoking status: Current Some Day Smoker     Packs/day: 0.25     Years: 19.00    Smokeless tobacco: Current User      Comment: smokes vapor cig     Alcohol use 0.0 oz/week     0 Standard drinks or equivalent per week      Comment: Socially.        Family History   Problem Relation Age of Onset    Anxiety Mother     Hypertension Mother     COPD Mother    Karen Carbine Arthritis-osteo Mother     Psychiatric Disorder Mother     Hypertension Father     Cancer Brother     Schizophrenia Maternal Aunt     Heart Disease Maternal Grandfather     Stroke Maternal Grandfather     Cancer Paternal Grandmother     Anesth Problems Neg Hx       Prior to Admission medications    Medication Sig Start Date End Date Taking? Authorizing Provider   clonazePAM (KLONOPIN) 0.5 mg tablet Take 0.5 mg by mouth two (2) times a day. Yes Daniela Oates MD   escitalopram oxalate (LEXAPRO) 20 mg tablet Take 20 mg by mouth daily. Yes Daniela Oates MD   albuterol (VENTOLIN HFA) 90 mcg/actuation inhaler Take 2 Puffs by inhalation every six (6) hours as needed for Wheezing. 4/30/17  Yes Makeda Gold MD   ALPRAZolam Maryfrances Lucrecia) 1 mg tablet Take 1-2 mg by mouth three (3) times daily as needed. 3/18/16  Yes Historical Provider   lidocaine (URO-JET) 2 % jelp jelly Apply to affected area 5/19/17   Mariluz King [address]   benzocaine-resorcinol-aloe-vit E-vit A&D (VAGISIL) 20-3 % topical cream Apply 3 g to affected area three (3) times daily. 5/19/17   Mariluz King [address]   polyethylene glycol (MIRALAX) 17 gram/dose powder Take 17 g by mouth daily. 1 tablespoon with 8 oz of water daily 5/19/17   Mariluz King [address]   docusate calcium (SURFAK) 240 mg capsule Take 1 Cap by mouth two (2) times a day for 90 days. 5/19/17 8/17/17  Mariluz King [address]   Lisdexamfetamine (VYVANSE) 50 mg cap Take by mouth daily. Daniela Oates MD     Allergies   Allergen Reactions    Other Food Itching     Fresh fruit, cannot be specific    Codeine Hives     Allergic to tylenol with codeine combo    Tylenol-Codeine #2 Hives    Nuts [Tree Nut] Itching        Review of Systems:    A comprehensive review of systems was negative except for that written in the History of Present Illness.     Objective:     Visit Vitals    /62    Pulse 97    Temp 99.3 °F (37.4 °C)    Resp 20    LMP 05/19/2015 (Approximate)    SpO2 100%        Physical Exam:   Visit Vitals    /62    Pulse 97    Temp 99.3 °F (37.4 °C)    Resp 20    LMP 05/19/2015 (Approximate)    SpO2 100% General appearance: alert, cooperative, no distress, appears stated age  Head: Normocephalic, without obvious abnormality, atraumatic  Eyes: negative  Ears: normal TM's and external ear canals AU  Neck: supple, symmetrical, trachea midline, no adenopathy, thyroid: not enlarged, symmetric, no tenderness/mass/nodules, no carotid bruit and no JVD  Back: symmetric, no curvature. ROM normal. No CVA tenderness. Lungs: clear to auscultation bilaterally  Heart: regular rate and rhythm, S1, S2 normal, no murmur, click, rub or gallop  Abdomen: soft, non-tender. Bowel sounds normal. No masses,  no organomegaly  Extremities: extremities normal, atraumatic, no cyanosis or edema  Lymph nodes: Cervical, supraclavicular, and axillary nodes normal.  Neurologic: Grossly normal    Imaging:  images and reports reviewed    Lab Review:    Recent Results (from the past 24 hour(s))   CBC WITH AUTOMATED DIFF    Collection Time: 05/20/17  7:50 AM   Result Value Ref Range    WBC 16.9 (H) 3.6 - 11.0 K/uL    RBC 3.61 (L) 3.80 - 5.20 M/uL    HGB 12.0 11.5 - 16.0 g/dL    HCT 34.0 (L) 35.0 - 47.0 %    MCV 94.2 80.0 - 99.0 FL    MCH 33.2 26.0 - 34.0 PG    MCHC 35.3 30.0 - 36.5 g/dL    RDW 13.4 11.5 - 14.5 %    PLATELET 089 (L) 249 - 400 K/uL    NEUTROPHILS 93 (H) 32 - 75 %    LYMPHOCYTES 2 (L) 12 - 49 %    MONOCYTES 5 5 - 13 %    EOSINOPHILS 0 0 - 7 %    BASOPHILS 0 0 - 1 %    ABS. NEUTROPHILS 15.8 (H) 1.8 - 8.0 K/UL    ABS. LYMPHOCYTES 0.3 (L) 0.8 - 3.5 K/UL    ABS. MONOCYTES 0.8 0.0 - 1.0 K/UL    ABS. EOSINOPHILS 0.0 0.0 - 0.4 K/UL    ABS.  BASOPHILS 0.0 0.0 - 0.1 K/UL    RBC COMMENTS NORMOCYTIC, NORMOCHROMIC      DF SMEAR SCANNED     METABOLIC PANEL, COMPREHENSIVE    Collection Time: 05/20/17  7:50 AM   Result Value Ref Range    Sodium 139 136 - 145 mmol/L    Potassium 3.3 (L) 3.5 - 5.1 mmol/L    Chloride 105 97 - 108 mmol/L    CO2 29 21 - 32 mmol/L    Anion gap 5 5 - 15 mmol/L    Glucose 96 65 - 100 mg/dL    BUN 10 6 - 20 MG/DL Creatinine 0.87 0.55 - 1.02 MG/DL    BUN/Creatinine ratio 11 (L) 12 - 20      GFR est AA >60 >60 ml/min/1.73m2    GFR est non-AA >60 >60 ml/min/1.73m2    Calcium 8.8 8.5 - 10.1 MG/DL    Bilirubin, total 0.7 0.2 - 1.0 MG/DL    ALT (SGPT) 20 12 - 78 U/L    AST (SGOT) 15 15 - 37 U/L    Alk. phosphatase 55 45 - 117 U/L    Protein, total 6.6 6.4 - 8.2 g/dL    Albumin 3.4 (L) 3.5 - 5.0 g/dL    Globulin 3.2 2.0 - 4.0 g/dL    A-G Ratio 1.1 1.1 - 2.2     LACTIC ACID, PLASMA    Collection Time: 05/20/17  7:51 AM   Result Value Ref Range    Lactic acid 1.9 0.4 - 2.0 MMOL/L   URINALYSIS W/ REFLEX CULTURE    Collection Time: 05/20/17  9:50 AM   Result Value Ref Range    Color YELLOW/STRAW      Appearance CLEAR CLEAR      Specific gravity >1.030 (H) 1.003 - 1.030    pH (UA) 7.0 5.0 - 8.0      Protein NEGATIVE  NEG mg/dL    Glucose NEGATIVE  NEG mg/dL    Ketone 15 (A) NEG mg/dL    Bilirubin NEGATIVE  NEG      Blood NEGATIVE  NEG      Urobilinogen 1.0 0.2 - 1.0 EU/dL    Nitrites NEGATIVE  NEG      Leukocyte Esterase NEGATIVE  NEG      WBC 0-4 0 - 4 /hpf    RBC 0-5 0 - 5 /hpf    Epithelial cells MANY (A) FEW /lpf    Bacteria 1+ (A) NEG /hpf    UA:UC IF INDICATED URINE CULTURE ORDERED (A) CNI     HCG URINE, QL    Collection Time: 05/20/17  9:50 AM   Result Value Ref Range    HCG urine, Ql. NEGATIVE  NEG         Labs and radiology: images and reports reviewed      Assessment:     Perianal abscess on ct scan. Deferred rectal exam. Patient will not be able to tolerate a drainage without sedation so I will take her to the OR for incision and drainage of perianal abscess and sigmoidoscopy. Discussed risks/benefits/alternatives. WIll decide if she needs to be admitted after evaluating the extent of the inflammation/infection. Plan:     1. I recommend proceeding with incision and drainage. Treatment alternatives were discussed.   2. Discussed aspects of surgical intervention, methods, risks (including by not limited to infection, bleeding, hematoma, and perforation of the intestines or solid organs), and the risks of general anesthetic. The patient understands the risks; any and all questions were answered to the patient's satisfaction.     Signed By: Burak Dominguez MD     May 20, 2017

## 2017-05-21 LAB
BACTERIA SPEC CULT: NORMAL
BASOPHILS # BLD AUTO: 0 K/UL (ref 0–0.1)
BASOPHILS # BLD: 0 % (ref 0–1)
CC UR VC: NORMAL
EOSINOPHIL # BLD: 0 K/UL (ref 0–0.4)
EOSINOPHIL NFR BLD: 0 % (ref 0–7)
ERYTHROCYTE [DISTWIDTH] IN BLOOD BY AUTOMATED COUNT: 13.9 % (ref 11.5–14.5)
HCT VFR BLD AUTO: 28.1 % (ref 35–47)
HGB BLD-MCNC: 9.7 G/DL (ref 11.5–16)
LYMPHOCYTES # BLD AUTO: 6 % (ref 12–49)
LYMPHOCYTES # BLD: 0.9 K/UL (ref 0.8–3.5)
MCH RBC QN AUTO: 32.7 PG (ref 26–34)
MCHC RBC AUTO-ENTMCNC: 34.5 G/DL (ref 30–36.5)
MCV RBC AUTO: 94.6 FL (ref 80–99)
MONOCYTES # BLD: 0.7 K/UL (ref 0–1)
MONOCYTES NFR BLD AUTO: 4 % (ref 5–13)
NEUTS SEG # BLD: 13.9 K/UL (ref 1.8–8)
NEUTS SEG NFR BLD AUTO: 90 % (ref 32–75)
PLATELET # BLD AUTO: 99 K/UL (ref 150–400)
RBC # BLD AUTO: 2.97 M/UL (ref 3.8–5.2)
SERVICE CMNT-IMP: NORMAL
WBC # BLD AUTO: 15.4 K/UL (ref 3.6–11)

## 2017-05-21 PROCEDURE — 74011250636 HC RX REV CODE- 250/636: Performed by: COLON & RECTAL SURGERY

## 2017-05-21 PROCEDURE — 65270000029 HC RM PRIVATE

## 2017-05-21 PROCEDURE — 99218 HC RM OBSERVATION: CPT

## 2017-05-21 PROCEDURE — 74011000250 HC RX REV CODE- 250: Performed by: COLON & RECTAL SURGERY

## 2017-05-21 PROCEDURE — 85025 COMPLETE CBC W/AUTO DIFF WBC: CPT | Performed by: COLON & RECTAL SURGERY

## 2017-05-21 PROCEDURE — 36415 COLL VENOUS BLD VENIPUNCTURE: CPT | Performed by: COLON & RECTAL SURGERY

## 2017-05-21 PROCEDURE — 74011250637 HC RX REV CODE- 250/637: Performed by: COLON & RECTAL SURGERY

## 2017-05-21 RX ORDER — VANCOMYCIN 2 GRAM/500 ML IN 0.9 % SODIUM CHLORIDE INTRAVENOUS
2000 ONCE
Status: COMPLETED | OUTPATIENT
Start: 2017-05-21 | End: 2017-05-22

## 2017-05-21 RX ORDER — METRONIDAZOLE 500 MG/100ML
500 INJECTION, SOLUTION INTRAVENOUS EVERY 8 HOURS
Status: DISCONTINUED | OUTPATIENT
Start: 2017-05-21 | End: 2017-05-22

## 2017-05-21 RX ORDER — LEVOFLOXACIN 5 MG/ML
750 INJECTION, SOLUTION INTRAVENOUS EVERY 24 HOURS
Status: DISCONTINUED | OUTPATIENT
Start: 2017-05-21 | End: 2017-05-22

## 2017-05-21 RX ADMIN — DOCUSATE CALCIUM 240 MG: 240 CAPSULE, LIQUID FILLED ORAL at 17:18

## 2017-05-21 RX ADMIN — HYDROMORPHONE HYDROCHLORIDE 1 MG: 1 INJECTION, SOLUTION INTRAMUSCULAR; INTRAVENOUS; SUBCUTANEOUS at 15:59

## 2017-05-21 RX ADMIN — ACETAMINOPHEN 650 MG: 325 TABLET, FILM COATED ORAL at 21:15

## 2017-05-21 RX ADMIN — HYDROMORPHONE HYDROCHLORIDE 1 MG: 1 INJECTION, SOLUTION INTRAMUSCULAR; INTRAVENOUS; SUBCUTANEOUS at 12:08

## 2017-05-21 RX ADMIN — DOCUSATE CALCIUM 240 MG: 240 CAPSULE, LIQUID FILLED ORAL at 08:29

## 2017-05-21 RX ADMIN — HYDROMORPHONE HYDROCHLORIDE 1 MG: 1 INJECTION, SOLUTION INTRAMUSCULAR; INTRAVENOUS; SUBCUTANEOUS at 05:42

## 2017-05-21 RX ADMIN — HYDROMORPHONE HYDROCHLORIDE 1 MG: 1 INJECTION, SOLUTION INTRAMUSCULAR; INTRAVENOUS; SUBCUTANEOUS at 21:03

## 2017-05-21 RX ADMIN — VANCOMYCIN HYDROCHLORIDE 2000 MG: 10 INJECTION, POWDER, LYOPHILIZED, FOR SOLUTION INTRAVENOUS at 18:26

## 2017-05-21 RX ADMIN — CLONAZEPAM 0.5 MG: 0.5 TABLET ORAL at 17:18

## 2017-05-21 RX ADMIN — METRONIDAZOLE 500 MG: 500 INJECTION, SOLUTION INTRAVENOUS at 17:18

## 2017-05-21 RX ADMIN — LEVOFLOXACIN 750 MG: 5 INJECTION, SOLUTION INTRAVENOUS at 11:38

## 2017-05-21 RX ADMIN — SODIUM CHLORIDE 50 ML/HR: 900 INJECTION, SOLUTION INTRAVENOUS at 14:48

## 2017-05-21 RX ADMIN — CLONAZEPAM 0.5 MG: 0.5 TABLET ORAL at 08:29

## 2017-05-21 RX ADMIN — OXYCODONE HYDROCHLORIDE AND ACETAMINOPHEN 1 TABLET: 5; 325 TABLET ORAL at 08:32

## 2017-05-21 RX ADMIN — ACETAMINOPHEN 650 MG: 325 TABLET, FILM COATED ORAL at 14:48

## 2017-05-21 RX ADMIN — Medication 10 ML: at 06:30

## 2017-05-21 RX ADMIN — METRONIDAZOLE 500 MG: 500 INJECTION, SOLUTION INTRAVENOUS at 01:35

## 2017-05-21 RX ADMIN — HYDROMORPHONE HYDROCHLORIDE 1 MG: 1 INJECTION, SOLUTION INTRAMUSCULAR; INTRAVENOUS; SUBCUTANEOUS at 01:35

## 2017-05-21 RX ADMIN — ESCITALOPRAM OXALATE 20 MG: 10 TABLET ORAL at 08:29

## 2017-05-21 RX ADMIN — ACETAMINOPHEN 650 MG: 325 TABLET, FILM COATED ORAL at 07:34

## 2017-05-21 RX ADMIN — Medication 10 ML: at 08:32

## 2017-05-21 RX ADMIN — OXYCODONE HYDROCHLORIDE AND ACETAMINOPHEN 1 TABLET: 5; 325 TABLET ORAL at 19:07

## 2017-05-21 RX ADMIN — METRONIDAZOLE 500 MG: 500 INJECTION, SOLUTION INTRAVENOUS at 10:07

## 2017-05-21 RX ADMIN — Medication 10 ML: at 21:04

## 2017-05-21 NOTE — OP NOTES
[address], [address]   OP NOTE       Name:  Claudio Shaw   MR#:  [id number]   :  1980   Account #:  [de-identified]    Surgery Date:  2017   Date of Adm:  2017       PREOPERATIVE DIAGNOSIS: Perirectal abscess. POSTOPERATIVE DIAGNOSIS: Perirectal abscess. PROCEDURES PERFORMED:   1. Incision and drainage of abscess. 2. Rigid proctoscopy. SURGEON: Lawerence Harada, MD.    ANESTHESIA: General, plus 30 mL of 0.25% Marcaine with   epinephrine. ESTIMATED BLOOD LOSS: 5 mL. SPECIMENS REMOVED: Perirectal abscess, which was cultured. DISPOSITION: The patient tolerated procedure well, was transferred   to recovery in stable condition. FINDINGS: Posterior perirectal abscess. INDICATIONS: This is a 59-year-old female with a history of an anal   abscess. Risks, benefits, alternatives were discussed with her   regarding incision and drainage. DESCRIPTION OF PROCEDURE: She was taken to the operating   room, placed on table, intubated and then flipped in the prone maeve-  knife position. Had smooth induction of anesthesia. Buttocks were   taped apart and the perineum was prepped and draped in the usual   fashion. Time-out was performed. Marcaine 0.25% with epinephrine   was infiltrated for a local block. A rigid proctoscopy was performed. It was   negative, other than stool in the anus and rectum. Made a cruciate   incision in the posterior perianal space. This was a deep posterior   abscess. I used a Stephanie clamp to spread and was able to enter the   cavity. I was able to identify some pus. This was cultured and irrigated   with saline. There were no other lesions, no other undrained   collections. I placed a 1/2-inch gauze in this. Sterile dressings were   applied. The patient tolerated procedure well, was transferred to   recovery in stable condition.  She will be admitted for observation given   her fevers and will be discharged tomorrow.         MD Dee Dee Newton / Manjula Hood   D:  05/20/2017   12:09   T:  05/20/2017   22:57   Job #:  [id number]

## 2017-05-21 NOTE — PROGRESS NOTES
End of Shift Nursing Note    Bedside shift change report given to Tiera Montero RN (oncoming nurse) by Clementina Venegas RN (offgoing nurse). Report included the following information SBAR, Kardex, MAR and Recent Results. Zone Phone:   [phone]    Significant changes during shift:    none   Non-emergent issues for physician to address:   Packing removed without difficulty. Number times ambulated in hallway past shift: 0      Number of times OOB to chair past shift: 3    POD #: 1     Vital Signs:    Temp: 98 °F (36.7 °C)     Pulse (Heart Rate): 82     BP: 98/54     Resp Rate: 16     O2 Sat (%): 96 %    Lines & Drains:    NG tube [] in [] removed [] not applicable   Drains [] in [] removed [] not applicable     Skin Integrity:      Wounds: yes   Dressings Present: no    Wound Concerns: no      GI:    Current diet:  DIET REGULAR High Fiber    Nausea: NO  Vomiting: NO  Bowel Sounds: YES  Flatus: NO  Last Bowel Movement: several days ago    Respiratory:  Supplemental O2: No      Incentive Spirometer: YES   Coughing and Deep Breathing: YES  Oral Care: YES  Understanding (patient/family education): YES   Getting out of bed: YES  Head of bed elevation: YES    Patient Safety:    Falls Score: 1  Mobility Score: 1  Bed Alarm On? No  Sitter? No      Opportunity for questions and clarification was given to oncoming nurse. Patient bed is in low position, side rails are up x 2, door & observation blinds open as needed, call bell within reach and patient not in distress.     Ricardo Wong RN

## 2017-05-21 NOTE — PROGRESS NOTES
Contacted by micro at [facility] due to lab culture that's pending Group A Strep is growing in specimen   MD Gaona ordered Vancomycin 1gm Q 12hr pharmacy contacted and infectious diease consult.

## 2017-05-21 NOTE — PROGRESS NOTES
Felt feverish last night. Perianal pain is improved  Visit Vitals    /57 (BP 1 Location: Left arm, BP Patient Position: At rest)    Pulse (!) 103    Temp 100.2 °F (37.9 °C)    Resp 16    SpO2 100%       Date 05/20/17 0700 - 05/21/17 0659 05/21/17 0700 - 05/22/17 0659   Shift 6510-3768 8345-1067 24 Hour Total 2998-8325 5105-8038 24 Hour Total   I  N  T  A  K  E   P.O.  120 120         P. O.  120 120       I.V. 1500 467.5 1967.5 569.2  569.2      I.V. 1100  1100         Volume (lactated ringers infusion) 400  400         Volume (0.9% sodium chloride infusion)  367.5 367.5 569.2  569.2      Volume (metroNIDAZOLE (FLAGYL) IVPB premix 500 mg)  100 100       Shift Total 1500 587. 5 2087.5 569.2  569.2   O  U  T  P  U  T   Urine 200 350 550 650  650      Urine Voided 200 350 550 650  650      Urine Occurrence(s) 4 x 1 x 5 x 3 x  3 x    Blood 5  5         Estimated Blood Loss 5  5       Shift Total 205 350 555 650  650   NET 1295 237.5 1532.5 -80.8  -80.8   Weight (kg)           abd soft  perin dressings in place    A/P still having some pain but improved  Still feverish  Will continue abx here and continue observation til tomorrow. Repeat labs tomorrow.  Repeat ct or EUA if no improvement

## 2017-05-21 NOTE — PROGRESS NOTES
Pharmacy Automatic Renal Dosing Protocol - Antimicrobials    Indication for Antimicrobials: Skin and soft tissue infection (Katie-rectal abscess)  Current Regimen of Each Antimicrobial (Start Day & Day of Therapy):  Levofloxacin 750 mg IV every 24 hours (started:  day 2)  Metronidazole 500 mg IV every 8 hours (started  day 2)    Significant Cultures:   Urine - Pending   Wound cx - rare gpc and gnr - Pending   Anaerobic - Pending    CAPD, Hemodialysis or Renal Replacement Therapy: na  Paralysis, amputations, malnutrition:  na  Recent Labs      17   0817  17   0750   CREA   --   0.87   BUN   --   10   WBC  15.4*  16.9*     Temp (24hrs), Av.9 °F (37.7 °C), Min:98 °F (36.7 °C), Max:102.7 °F (39.3 °C)    Creatinine Clearance (Creatinine Clearance (ml/min)): 80 ml/min    Impression/Plan:   - Levofloxacin dosed appropriately based on renal function and indication. Will verify  - Metronidazole is not renally dosed - appropriate based on indication  - Follow cultures - may recommend to add vancomycin if needed   - Will order BMP for tomorrow       Pharmacy will follow daily and adjust medications as appropriate for renal function and/or serum levels.     Thank you,  Giovani Mckeon, [place]     Renal Dosing Tables on [employer]

## 2017-05-21 NOTE — PROGRESS NOTES
Oral and Written notification given to patient and/or caregiver informing them that they are currently an Outpatient receiving care in our facility. Outpatient services include Observation Services.      Ana Luisa Payne, [address] [place] [address]

## 2017-05-22 ENCOUNTER — APPOINTMENT (OUTPATIENT)
Dept: CT IMAGING | Age: 37
DRG: 854 | End: 2017-05-22
Attending: COLON & RECTAL SURGERY
Payer: COMMERCIAL

## 2017-05-22 PROBLEM — K61.1 PERIRECTAL ABSCESS: Status: ACTIVE | Noted: 2017-05-22

## 2017-05-22 LAB
ANION GAP BLD CALC-SCNC: 5 MMOL/L (ref 5–15)
BACTERIA SPEC CULT: ABNORMAL
BACTERIA SPEC CULT: NORMAL
BACTERIA SPEC CULT: NORMAL
BUN SERPL-MCNC: 8 MG/DL (ref 6–20)
BUN/CREAT SERPL: 12 (ref 12–20)
CALCIUM SERPL-MCNC: 7.9 MG/DL (ref 8.5–10.1)
CC UR VC: NORMAL
CHLORIDE SERPL-SCNC: 107 MMOL/L (ref 97–108)
CO2 SERPL-SCNC: 29 MMOL/L (ref 21–32)
CREAT SERPL-MCNC: 0.67 MG/DL (ref 0.55–1.02)
ERYTHROCYTE [DISTWIDTH] IN BLOOD BY AUTOMATED COUNT: 14.1 % (ref 11.5–14.5)
GLUCOSE SERPL-MCNC: 94 MG/DL (ref 65–100)
GRAM STN SPEC: ABNORMAL
HCT VFR BLD AUTO: 29.9 % (ref 35–47)
HGB BLD-MCNC: 10.2 G/DL (ref 11.5–16)
MCH RBC QN AUTO: 33 PG (ref 26–34)
MCHC RBC AUTO-ENTMCNC: 34.1 G/DL (ref 30–36.5)
MCV RBC AUTO: 96.8 FL (ref 80–99)
PLATELET # BLD AUTO: 100 K/UL (ref 150–400)
POTASSIUM SERPL-SCNC: 3.5 MMOL/L (ref 3.5–5.1)
RBC # BLD AUTO: 3.09 M/UL (ref 3.8–5.2)
SERVICE CMNT-IMP: ABNORMAL
SERVICE CMNT-IMP: NORMAL
SERVICE CMNT-IMP: NORMAL
SODIUM SERPL-SCNC: 141 MMOL/L (ref 136–145)
WBC # BLD AUTO: 11.6 K/UL (ref 3.6–11)

## 2017-05-22 PROCEDURE — 87389 HIV-1 AG W/HIV-1&-2 AB AG IA: CPT | Performed by: INTERNAL MEDICINE

## 2017-05-22 PROCEDURE — 74011000258 HC RX REV CODE- 258: Performed by: INTERNAL MEDICINE

## 2017-05-22 PROCEDURE — 80048 BASIC METABOLIC PNL TOTAL CA: CPT | Performed by: COLON & RECTAL SURGERY

## 2017-05-22 PROCEDURE — 72193 CT PELVIS W/DYE: CPT

## 2017-05-22 PROCEDURE — 85027 COMPLETE CBC AUTOMATED: CPT | Performed by: COLON & RECTAL SURGERY

## 2017-05-22 PROCEDURE — 65660000000 HC RM CCU STEPDOWN

## 2017-05-22 PROCEDURE — 74011250637 HC RX REV CODE- 250/637: Performed by: COLON & RECTAL SURGERY

## 2017-05-22 PROCEDURE — 86803 HEPATITIS C AB TEST: CPT | Performed by: INTERNAL MEDICINE

## 2017-05-22 PROCEDURE — 36415 COLL VENOUS BLD VENIPUNCTURE: CPT | Performed by: COLON & RECTAL SURGERY

## 2017-05-22 PROCEDURE — 86592 SYPHILIS TEST NON-TREP QUAL: CPT | Performed by: INTERNAL MEDICINE

## 2017-05-22 PROCEDURE — 74011000258 HC RX REV CODE- 258: Performed by: COLON & RECTAL SURGERY

## 2017-05-22 PROCEDURE — 74011000255 HC RX REV CODE- 255: Performed by: COLON & RECTAL SURGERY

## 2017-05-22 PROCEDURE — 74011250636 HC RX REV CODE- 250/636: Performed by: COLON & RECTAL SURGERY

## 2017-05-22 PROCEDURE — 74011250636 HC RX REV CODE- 250/636: Performed by: INTERNAL MEDICINE

## 2017-05-22 PROCEDURE — 74011636320 HC RX REV CODE- 636/320: Performed by: COLON & RECTAL SURGERY

## 2017-05-22 PROCEDURE — 74011000250 HC RX REV CODE- 250: Performed by: COLON & RECTAL SURGERY

## 2017-05-22 RX ORDER — SODIUM CHLORIDE 9 MG/ML
50 INJECTION, SOLUTION INTRAVENOUS
Status: COMPLETED | OUTPATIENT
Start: 2017-05-22 | End: 2017-05-22

## 2017-05-22 RX ORDER — BARIUM SULFATE 20 MG/ML
900 SUSPENSION ORAL
Status: COMPLETED | OUTPATIENT
Start: 2017-05-22 | End: 2017-05-22

## 2017-05-22 RX ORDER — ONDANSETRON 4 MG/1
4 TABLET, ORALLY DISINTEGRATING ORAL
Status: DISCONTINUED | OUTPATIENT
Start: 2017-05-22 | End: 2017-05-24 | Stop reason: HOSPADM

## 2017-05-22 RX ORDER — SODIUM CHLORIDE 0.9 % (FLUSH) 0.9 %
10 SYRINGE (ML) INJECTION
Status: COMPLETED | OUTPATIENT
Start: 2017-05-22 | End: 2017-05-22

## 2017-05-22 RX ORDER — HYDROMORPHONE HYDROCHLORIDE 1 MG/ML
1 INJECTION, SOLUTION INTRAMUSCULAR; INTRAVENOUS; SUBCUTANEOUS
Status: DISCONTINUED | OUTPATIENT
Start: 2017-05-22 | End: 2017-05-24 | Stop reason: HOSPADM

## 2017-05-22 RX ORDER — OXYCODONE AND ACETAMINOPHEN 5; 325 MG/1; MG/1
2 TABLET ORAL
Status: DISCONTINUED | OUTPATIENT
Start: 2017-05-22 | End: 2017-05-24 | Stop reason: HOSPADM

## 2017-05-22 RX ORDER — ONDANSETRON 2 MG/ML
4 INJECTION INTRAMUSCULAR; INTRAVENOUS
Status: DISCONTINUED | OUTPATIENT
Start: 2017-05-22 | End: 2017-05-24 | Stop reason: HOSPADM

## 2017-05-22 RX ADMIN — BARIUM SULFATE 900 ML: 21 SUSPENSION ORAL at 11:58

## 2017-05-22 RX ADMIN — HYDROMORPHONE HYDROCHLORIDE 1 MG: 1 INJECTION, SOLUTION INTRAMUSCULAR; INTRAVENOUS; SUBCUTANEOUS at 03:29

## 2017-05-22 RX ADMIN — DOCUSATE CALCIUM 240 MG: 240 CAPSULE, LIQUID FILLED ORAL at 08:23

## 2017-05-22 RX ADMIN — ESCITALOPRAM OXALATE 20 MG: 10 TABLET ORAL at 08:23

## 2017-05-22 RX ADMIN — IOPAMIDOL 100 ML: 755 INJECTION, SOLUTION INTRAVENOUS at 14:44

## 2017-05-22 RX ADMIN — SODIUM CHLORIDE 50 ML/HR: 900 INJECTION, SOLUTION INTRAVENOUS at 14:45

## 2017-05-22 RX ADMIN — METRONIDAZOLE 500 MG: 500 INJECTION, SOLUTION INTRAVENOUS at 02:06

## 2017-05-22 RX ADMIN — ALPRAZOLAM 1 MG: 0.5 TABLET ORAL at 10:48

## 2017-05-22 RX ADMIN — OXYCODONE HYDROCHLORIDE AND ACETAMINOPHEN 1 TABLET: 5; 325 TABLET ORAL at 06:50

## 2017-05-22 RX ADMIN — Medication 10 ML: at 06:43

## 2017-05-22 RX ADMIN — Medication 10 ML: at 22:00

## 2017-05-22 RX ADMIN — HYDROMORPHONE HYDROCHLORIDE 1 MG: 1 INJECTION, SOLUTION INTRAMUSCULAR; INTRAVENOUS; SUBCUTANEOUS at 08:17

## 2017-05-22 RX ADMIN — HYDROMORPHONE HYDROCHLORIDE 1 MG: 1 INJECTION, SOLUTION INTRAMUSCULAR; INTRAVENOUS; SUBCUTANEOUS at 13:33

## 2017-05-22 RX ADMIN — ONDANSETRON HYDROCHLORIDE 4 MG: 2 INJECTION, SOLUTION INTRAMUSCULAR; INTRAVENOUS at 12:27

## 2017-05-22 RX ADMIN — METRONIDAZOLE 500 MG: 500 INJECTION, SOLUTION INTRAVENOUS at 09:25

## 2017-05-22 RX ADMIN — ACETAMINOPHEN 650 MG: 325 TABLET, FILM COATED ORAL at 22:35

## 2017-05-22 RX ADMIN — Medication 10 ML: at 13:30

## 2017-05-22 RX ADMIN — OXYCODONE HYDROCHLORIDE AND ACETAMINOPHEN 1 TABLET: 5; 325 TABLET ORAL at 10:48

## 2017-05-22 RX ADMIN — CLONAZEPAM 0.5 MG: 0.5 TABLET ORAL at 18:54

## 2017-05-22 RX ADMIN — OXYCODONE HYDROCHLORIDE AND ACETAMINOPHEN 1 TABLET: 5; 325 TABLET ORAL at 00:21

## 2017-05-22 RX ADMIN — ACETAMINOPHEN 650 MG: 325 TABLET, FILM COATED ORAL at 12:01

## 2017-05-22 RX ADMIN — LEVOFLOXACIN 750 MG: 5 INJECTION, SOLUTION INTRAVENOUS at 10:43

## 2017-05-22 RX ADMIN — SODIUM CHLORIDE 3 G: 900 INJECTION INTRAVENOUS at 13:29

## 2017-05-22 RX ADMIN — CLONAZEPAM 0.5 MG: 0.5 TABLET ORAL at 08:23

## 2017-05-22 RX ADMIN — HYDROMORPHONE HYDROCHLORIDE 1 MG: 1 INJECTION, SOLUTION INTRAMUSCULAR; INTRAVENOUS; SUBCUTANEOUS at 18:06

## 2017-05-22 RX ADMIN — VANCOMYCIN HYDROCHLORIDE 1000 MG: 1 INJECTION, POWDER, LYOPHILIZED, FOR SOLUTION INTRAVENOUS at 06:41

## 2017-05-22 RX ADMIN — Medication 10 ML: at 14:44

## 2017-05-22 RX ADMIN — AMPICILLIN SODIUM AND SULBACTAM SODIUM 3 G: 2; 1 INJECTION, POWDER, FOR SOLUTION INTRAMUSCULAR; INTRAVENOUS at 18:54

## 2017-05-22 RX ADMIN — SODIUM CHLORIDE 125 ML/HR: 900 INJECTION, SOLUTION INTRAVENOUS at 12:28

## 2017-05-22 RX ADMIN — HYDROMORPHONE HYDROCHLORIDE 1 MG: 1 INJECTION, SOLUTION INTRAMUSCULAR; INTRAVENOUS; SUBCUTANEOUS at 22:32

## 2017-05-22 RX ADMIN — DOCUSATE CALCIUM 240 MG: 240 CAPSULE, LIQUID FILLED ORAL at 18:54

## 2017-05-22 NOTE — PROGRESS NOTES
Pt is a 40 y/o  female admitted for perirectal abscess. Pt lives alone and has custody of her three children (ages 16, 15 and 10). Pt lives in a one story home with 10-steps to the entrance. Pt is independent with ADL's to include driving. Pt has no previous providers for [facility], SNF or DME. Pt prefers [facility] pharmacy at the intersection of [facility] and [address]. Pt will be transported at discharge by,  Micah Johnny. CM met with pt to complete initial assessment. Pt is alert and oriented to person, place, time and situation. CM was advised to change pt's emergency contact to Mr. Caden Li ([phone]); CM completed change in CC. CM will continue to follow to assist with discharge plans as needed. Care Management Interventions  PCP Verified by CM: Yes (Dr. Lazaro Tapia)  Mode of Transport at Discharge:  Other (see comment) (private vehicle )  Transition of Care Consult (CM Consult): Discharge Planning  Discharge Durable Medical Equipment: No  Health Maintenance Reviewed: Yes  Physical Therapy Consult: No  Occupational Therapy Consult: No  Speech Therapy Consult: No  Current Support Network: Lives Alone (Pt lives with her three children (ages 16, 15 and 10) )  Confirm Follow Up Transport: Self  Plan discussed with Pt/Family/Caregiver: Yes  Discharge Location  Discharge Placement: Home    SANDRA Bella, [facility]   [phone]

## 2017-05-22 NOTE — PROGRESS NOTES
Interdisciplinary Rounds were completed on this patient. Rounds included nursing, clinical care leader, pharmacy, and case management. Patient was doing well with some problems : pain control. Patient had the following concerns: pain. Goals for the day will include: pharmacy to reach out to Dr. Rosendo Vasquez to question adjusting patients frequency of pain medication.

## 2017-05-22 NOTE — PROGRESS NOTES
CRS POD # 2    Dr Claudia Prater help much appreciated! Still no BM;  Drainage down (none reported today); Pain better but still moderately sore;  WBC down but not quite normal yet; In view of all this, will keep in house one more day on IV ABO, then repeat CBC in AM and if all is better, she could go home on augmentin.

## 2017-05-22 NOTE — PROGRESS NOTES
Pharmacy:     Pharmacy does not stock VAGISIL CREAM    Per discussion w/ patient: she has not been using it so will remove from [facility]. Thanks.   Sherie Hu, [facility]

## 2017-05-22 NOTE — PROGRESS NOTES
End of Shift Nursing Note    Bedside shift change report given to Naty Oates (oncoming nurse) by Manjula Delarosa (offgoing nurse). Report included the following information Intake/Output, MAR and Recent Results. Zone Phone:   [phone]    Significant changes during shift:   Temp. 103 at 2115, Tylenol given will recheck later. At [address] is 100. At 0022 temp is 99.8. 0200 Temp 98.9. At 0400 Temp 98.8. 0700 Temp 100, patient just received Percocet. Non-emergent issues for physician to address:   None     Number times ambulated in hallway past shift: 0      Number of times OOB to chair past shift: 2    POD #:      Vital Signs:    Temp: (!) 103 °F (39.4 °C)     Pulse (Heart Rate): 100     BP: 97/48     Resp Rate: 16     O2 Sat (%): 100 %    Lines & Drains:     Urinary Catheter? No   Placement Date:    Medical Necessity:   Central Line? No   Placement Date:    Medical Necessity:   PICC Line? Placement Date:    Medical Necessity:     NG tube [] in [] removed [] not applicable   Drains [] in [] removed [] not applicable     Skin Integrity:      Wounds: no   Dressings Present: no    Wound Concerns: no      GI:    Current diet:  DIET REGULAR High Fiber    Nausea: NO  Vomiting: NO  Bowel Sounds: YES  Flatus: YES  Last Bowel Movement:    Appearance:     Respiratory:  Supplemental O2: No      Device:    via  Liters/min     Incentive Spirometer: YES  Volume:   Coughing and Deep Breathing: YES  Oral Care: YES  Understanding (patient/family education): YES   Getting out of bed: YES  Head of bed elevation: YES    Patient Safety:    Falls Score: 0  Mobility Score: 5  Bed Alarm On? No  Sitter? No      Opportunity for questions and clarification was given to oncoming nurse. Patient bed is in low position, side rails are up x 2, door & observation blinds open as needed, call bell within reach and patient not in distress.     King Salmon Males

## 2017-05-22 NOTE — PROGRESS NOTES
Infectious Disease Consult    I am asked to see this patient by Dr. Leo Turner for advice/opinion related to evaluating perirectal abscess. The patient was interviewed and examined. All available records were reviewed in detail. The patient is a 39 y.o. female admitted to [facility] on 5/20/2017 with fever and 3 days of progressive perirectal pain and tenderness. She is a 36 up  WF from [place], [place] She had anal intercourse about a week before onset of symptoms. No foreign objects used. Was febrile to 103 on admission with a WBC of 17,000 and a CT showing a posterior perirectal abscess. Taken to the OR by Dr. Leo Turner on 5/20 for I&D. Gram stain showed GPC and GNR, and has grown group A streptococci so far. Today temp has dropped and WBC down to 11,600 on vancomycin, levofloxacin, and metronidazole. Pain is somewhat lessened as well. No history of recent STDs. No antibiotic allergies. Comorbid history of ADHD, depression/anxiety, tobacco use, asthma, and s/p hysterectomy. Medication list, past medical history, and social history were all reviewed. Impression:    1. Perirectal abscess. Usually mixed enteric rhiannon. Her primary pathogen seems to be a group A streptococcus, which is a bit unusual, but is part of her skin rhiannon. Does produce significant toxins, which can cause more in way of systemic toxicity. Can change to monotherapy with Unasyn which will cover anaerobes, streptococci, and GNRs until she improves, then can consolidate with oral Augmentin of Moxifloxacin to complete Tx as an outpatient. Would re-image for undrained pus if she does not continue to improve. Will also get baseline STD and HIV serologies. Plan:    See above. Will follow with you.        Subjective:   Date of Consultation:  May 22, 2017  Referring Physician: Leo Turner    Patient Active Problem List   Diagnosis Code    Generalized anxiety disorder F41.1    Smoker F17.200    Bilateral edema of lower extremity R60.0    Female pelvic hematoma N94.89    Post-operative hemorrhage [id number]    Adjustment disorder with mixed anxiety and depressed mood F43.23    ADHD (attention deficit hyperactivity disorder), combined type F90.2    Perirectal abscess K61.1     Past Medical History:   Diagnosis Date    ADHD (attention deficit hyperactivity disorder)     Anxiety     Anxiety     Asthma     CHILDHOOD    Chronic back pain     Chronic pain     DISC ISSUES IN BACK    Psychiatric disorder     ANXIETY      Family History   Problem Relation Age of Onset    Anxiety Mother     Hypertension Mother     COPD Mother     Arthritis-osteo Mother     Psychiatric Disorder Mother     Hypertension Father     Cancer Brother     Schizophrenia Maternal Aunt     Heart Disease Maternal Grandfather     Stroke Maternal Grandfather     Cancer Paternal Grandmother     Anesth Problems Neg Hx       Social History   Substance Use Topics    Smoking status: Current Some Day Smoker     Packs/day: 0.25     Years: 19.00    Smokeless tobacco: Current User      Comment: smokes vapor cig     Alcohol use 0.0 oz/week     0 Standard drinks or equivalent per week      Comment: Socially. Past Surgical History:   Procedure Laterality Date    BREAST SURGERY PROCEDURE UNLISTED  2002    reduction    HX BREAST REDUCTION      HX CYST REMOVAL Right     ganglion cyst removed from right wrist    HX GYN      BTL    HX GYN      D&C X 2    HX GYN  2012    Hysterectomy    HX OTHER SURGICAL  5/2013    cyst removed from right hand    HX TONSIL AND ADENOIDECTOMY      HX TONSILLECTOMY      HX WISDOM TEETH EXTRACTION        Prior to Admission medications    Medication Sig Start Date End Date Taking? Authorizing Provider   oxyCODONE-acetaminophen (PERCOCET) 5-325 mg per tablet Take 1 Tab by mouth every four (4) hours as needed for Pain. Max Daily Amount: 6 Tabs.  5/20/17  Yes Denver Webb MD   levoFLOXacin (LEVAQUIN) 250 mg tablet Take 2 Tabs by mouth daily for 7 days. 5/20/17 5/27/17 Yes Jeff Alvarado MD   metroNIDAZOLE (FLAGYL) 500 mg tablet Take 1 Tab by mouth three (3) times daily for 7 days. 5/20/17 5/27/17 Yes Jeff Alvarado MD   clonazePAM (KLONOPIN) 0.5 mg tablet Take 0.5 mg by mouth two (2) times a day. Yes Daniela Other, MD   Lisdexamfetamine (VYVANSE) 50 mg cap Take by mouth daily. Yes Phys Other, MD   escitalopram oxalate (LEXAPRO) 20 mg tablet Take 20 mg by mouth daily. Yes Phys Other, MD   ALPRAZolam Cydne Krum) 1 mg tablet Take 1-2 mg by mouth three (3) times daily as needed. 3/18/16  Yes Historical Provider   lidocaine (URO-JET) 2 % jelp jelly Apply to affected area 5/19/17   Nga Rankin   benzocaine-resorcinol-aloe-vit E-vit A&D (VAGISIL) 20-3 % topical cream Apply 3 g to affected area three (3) times daily. 5/19/17   Nga Rankin   polyethylene glycol (MIRALAX) 17 gram/dose powder Take 17 g by mouth daily. 1 tablespoon with 8 oz of water daily 5/19/17   Mariluz King 49Luis Parekh   docusate calcium (SURFAK) 240 mg capsule Take 1 Cap by mouth two (2) times a day for 90 days. 5/19/17 8/17/17  JUDI Rankin   albuterol (VENTOLIN HFA) 90 mcg/actuation inhaler Take 2 Puffs by inhalation every six (6) hours as needed for Wheezing. 4/30/17   Chapo Hernandez MD     Allergies   Allergen Reactions    Other Food Itching     Fresh fruit, cannot be specific    Codeine Hives     Allergic to tylenol with codeine combo    Tylenol-Codeine #2 Hives    Nuts [Tree Nut] Itching        Review of Systems:    + fever, sweats, or chills. -weight loss. +fatigue/malasie.  -Rash.  -Swollen glands.  -Oral lesions.  -Photophobia. -Jaundice.   - SOB. - cough. - abdominal pain or tenderness.  - Nausea or vomiting.  - Diarrhea. + rectal pain. No joint inflammation or swelling. No headache or AMS. ROS otherwise negative with greater than 10 systems reviewed.       Objective:   Blood pressure (!) 82/66, pulse 93, temperature 99.1 °F (37.3 °C), resp. rate 16, weight 159 lb (72.1 kg), last menstrual period 2015, SpO2 98 %. Temp (24hrs), Av.1 °F (37.8 °C), Min:98.3 °F (36.8 °C), Max:103 °F (39.4 °C)  Exam:  General: Low grade fever, but not toxic. No exanthem or enanthem. No peripheral adenopathy. Alert and oriented x3. All IV sites are clean and dry. HEENT: EOMI. Anicteric. Oral mucosa normal. Conjunctivae normal.   Chest: Lungs clear to A&P. Regular rhythm without murmurs. Abdomen: Soft without organomegaly, tenderness, or masses. Nondistended. Bowel sounds normal. Dressing over rectal area. Musculoskeletal: No joint inflammation or effusions. No edema. Nail beds normal.  Neurologic: Nonfocal exam.          Data Review:  WBC = 11,600. H/H = 10.2/29. 9. Plts = 100,000. Creat = 0.67. LA= 1.9. Imaging and cultures as per HPI.       Current Facility-Administered Medications   Medication Dose Route Frequency    oxyCODONE-acetaminophen (PERCOCET) 5-325 mg per tablet 2 Tab  2 Tab Oral Q4H PRN    HYDROmorphone (PF) (DILAUDID) injection 1 mg  1 mg IntraVENous Q3H PRN    iopamidol (ISOVUE-370) 76 % injection 100 mL  100 mL IntraVENous RAD ONCE    0.9% sodium chloride infusion  50 mL/hr IntraVENous RAD ONCE    sodium chloride (NS) flush 10 mL  10 mL IntraVENous RAD ONCE    ondansetron (ZOFRAN ODT) tablet 4 mg  4 mg Oral Q6H PRN    Or    ondansetron (ZOFRAN) injection 4 mg  4 mg IntraVENous Q8H PRN    ampicillin-sulbactam (UNASYN) 3 g in 0.9% sodium chloride (MBP/ADV) 100 mL MBP  3 g IntraVENous Q8H    vancomycin (VANCOCIN) 1,000 mg in 0.9% sodium chloride (MBP/ADV) 250 mL  1,000 mg IntraVENous Q12H    ALPRAZolam (XANAX) tablet 1 mg  1 mg Oral QID PRN    escitalopram oxalate (LEXAPRO) tablet 20 mg  20 mg Oral DAILY    albuterol (PROVENTIL HFA, VENTOLIN HFA, PROAIR HFA) inhaler 2 Puff  2 Puff Inhalation Q6H PRN    clonazePAM (KlonoPIN) tablet 0.5 mg  0.5 mg Oral BID    docusate calcium (SURFAK) capsule 240 mg  240 mg Oral BID    sodium chloride (NS) flush 5-10 mL  5-10 mL IntraVENous Q8H    sodium chloride (NS) flush 5-10 mL  5-10 mL IntraVENous PRN    naloxone (NARCAN) injection 0.4 mg  0.4 mg IntraVENous EVERY 2 MINUTES AS NEEDED    acetaminophen (TYLENOL) tablet 650 mg  650 mg Oral Q4H PRN    0.9% sodium chloride infusion  125 mL/hr IntraVENous CONTINUOUS            Signed By: Juarez Peña MD     May 22, 2017

## 2017-05-23 LAB
ANION GAP BLD CALC-SCNC: 6 MMOL/L (ref 5–15)
BASOPHILS # BLD AUTO: 0 K/UL (ref 0–0.1)
BASOPHILS # BLD: 0 % (ref 0–1)
BUN SERPL-MCNC: 5 MG/DL (ref 6–20)
BUN/CREAT SERPL: 7 (ref 12–20)
CALCIUM SERPL-MCNC: 8.2 MG/DL (ref 8.5–10.1)
CHLORIDE SERPL-SCNC: 108 MMOL/L (ref 97–108)
CO2 SERPL-SCNC: 28 MMOL/L (ref 21–32)
CREAT SERPL-MCNC: 0.67 MG/DL (ref 0.55–1.02)
EOSINOPHIL # BLD: 0.1 K/UL (ref 0–0.4)
EOSINOPHIL NFR BLD: 1 % (ref 0–7)
ERYTHROCYTE [DISTWIDTH] IN BLOOD BY AUTOMATED COUNT: 14.1 % (ref 11.5–14.5)
GLUCOSE SERPL-MCNC: 100 MG/DL (ref 65–100)
HCT VFR BLD AUTO: 27.4 % (ref 35–47)
HGB BLD-MCNC: 9.5 G/DL (ref 11.5–16)
LYMPHOCYTES # BLD AUTO: 5 % (ref 12–49)
LYMPHOCYTES # BLD: 0.5 K/UL (ref 0.8–3.5)
MCH RBC QN AUTO: 33.1 PG (ref 26–34)
MCHC RBC AUTO-ENTMCNC: 34.7 G/DL (ref 30–36.5)
MCV RBC AUTO: 95.5 FL (ref 80–99)
MONOCYTES # BLD: 0.5 K/UL (ref 0–1)
MONOCYTES NFR BLD AUTO: 5 % (ref 5–13)
NEUTS SEG # BLD: 8.9 K/UL (ref 1.8–8)
NEUTS SEG NFR BLD AUTO: 89 % (ref 32–75)
PLATELET # BLD AUTO: 110 K/UL (ref 150–400)
POTASSIUM SERPL-SCNC: 3.5 MMOL/L (ref 3.5–5.1)
RBC # BLD AUTO: 2.87 M/UL (ref 3.8–5.2)
RPR SER QL: NONREACTIVE
SODIUM SERPL-SCNC: 142 MMOL/L (ref 136–145)
WBC # BLD AUTO: 10 K/UL (ref 3.6–11)

## 2017-05-23 PROCEDURE — 74011250636 HC RX REV CODE- 250/636: Performed by: INTERNAL MEDICINE

## 2017-05-23 PROCEDURE — 80048 BASIC METABOLIC PNL TOTAL CA: CPT | Performed by: COLON & RECTAL SURGERY

## 2017-05-23 PROCEDURE — 36415 COLL VENOUS BLD VENIPUNCTURE: CPT | Performed by: COLON & RECTAL SURGERY

## 2017-05-23 PROCEDURE — 74011000258 HC RX REV CODE- 258: Performed by: INTERNAL MEDICINE

## 2017-05-23 PROCEDURE — 87040 BLOOD CULTURE FOR BACTERIA: CPT

## 2017-05-23 PROCEDURE — 74011250636 HC RX REV CODE- 250/636: Performed by: COLON & RECTAL SURGERY

## 2017-05-23 PROCEDURE — 74011250637 HC RX REV CODE- 250/637: Performed by: COLON & RECTAL SURGERY

## 2017-05-23 PROCEDURE — 87086 URINE CULTURE/COLONY COUNT: CPT

## 2017-05-23 PROCEDURE — 85025 COMPLETE CBC W/AUTO DIFF WBC: CPT | Performed by: INTERNAL MEDICINE

## 2017-05-23 PROCEDURE — 65660000000 HC RM CCU STEPDOWN

## 2017-05-23 RX ORDER — AMOXICILLIN AND CLAVULANATE POTASSIUM 875; 125 MG/1; MG/1
1 TABLET, FILM COATED ORAL 2 TIMES DAILY
Qty: 14 TAB | Refills: 0 | Status: SHIPPED | OUTPATIENT
Start: 2017-05-23 | End: 2017-05-30

## 2017-05-23 RX ADMIN — ESCITALOPRAM OXALATE 20 MG: 10 TABLET ORAL at 10:58

## 2017-05-23 RX ADMIN — DOCUSATE CALCIUM 240 MG: 240 CAPSULE, LIQUID FILLED ORAL at 16:49

## 2017-05-23 RX ADMIN — ALPRAZOLAM 1 MG: 0.5 TABLET ORAL at 21:44

## 2017-05-23 RX ADMIN — HYDROMORPHONE HYDROCHLORIDE 1 MG: 1 INJECTION, SOLUTION INTRAMUSCULAR; INTRAVENOUS; SUBCUTANEOUS at 14:04

## 2017-05-23 RX ADMIN — Medication 10 ML: at 14:05

## 2017-05-23 RX ADMIN — AMPICILLIN SODIUM AND SULBACTAM SODIUM 3 G: 2; 1 INJECTION, POWDER, FOR SOLUTION INTRAMUSCULAR; INTRAVENOUS at 11:09

## 2017-05-23 RX ADMIN — OXYCODONE HYDROCHLORIDE AND ACETAMINOPHEN 2 TABLET: 5; 325 TABLET ORAL at 23:30

## 2017-05-23 RX ADMIN — Medication 10 ML: at 06:00

## 2017-05-23 RX ADMIN — SODIUM CHLORIDE 125 ML/HR: 900 INJECTION, SOLUTION INTRAVENOUS at 16:52

## 2017-05-23 RX ADMIN — HYDROMORPHONE HYDROCHLORIDE 1 MG: 1 INJECTION, SOLUTION INTRAMUSCULAR; INTRAVENOUS; SUBCUTANEOUS at 20:56

## 2017-05-23 RX ADMIN — HYDROMORPHONE HYDROCHLORIDE 1 MG: 1 INJECTION, SOLUTION INTRAMUSCULAR; INTRAVENOUS; SUBCUTANEOUS at 10:50

## 2017-05-23 RX ADMIN — Medication 10 ML: at 23:21

## 2017-05-23 RX ADMIN — CLONAZEPAM 0.5 MG: 0.5 TABLET ORAL at 10:58

## 2017-05-23 RX ADMIN — HYDROMORPHONE HYDROCHLORIDE 1 MG: 1 INJECTION, SOLUTION INTRAMUSCULAR; INTRAVENOUS; SUBCUTANEOUS at 02:28

## 2017-05-23 RX ADMIN — HYDROMORPHONE HYDROCHLORIDE 1 MG: 1 INJECTION, SOLUTION INTRAMUSCULAR; INTRAVENOUS; SUBCUTANEOUS at 07:15

## 2017-05-23 RX ADMIN — OXYCODONE HYDROCHLORIDE AND ACETAMINOPHEN 2 TABLET: 5; 325 TABLET ORAL at 00:30

## 2017-05-23 RX ADMIN — AMPICILLIN SODIUM AND SULBACTAM SODIUM 3 G: 2; 1 INJECTION, POWDER, FOR SOLUTION INTRAMUSCULAR; INTRAVENOUS at 00:30

## 2017-05-23 RX ADMIN — CLONAZEPAM 0.5 MG: 0.5 TABLET ORAL at 16:49

## 2017-05-23 RX ADMIN — DOCUSATE CALCIUM 240 MG: 240 CAPSULE, LIQUID FILLED ORAL at 10:58

## 2017-05-23 RX ADMIN — SODIUM CHLORIDE 3 G: 900 INJECTION, SOLUTION INTRAVENOUS at 23:21

## 2017-05-23 RX ADMIN — AMPICILLIN SODIUM AND SULBACTAM SODIUM 3 G: 2; 1 INJECTION, POWDER, FOR SOLUTION INTRAMUSCULAR; INTRAVENOUS at 06:00

## 2017-05-23 RX ADMIN — SODIUM CHLORIDE 3 G: 900 INJECTION, SOLUTION INTRAVENOUS at 16:48

## 2017-05-23 RX ADMIN — ONDANSETRON 4 MG: 4 TABLET, ORALLY DISINTEGRATING ORAL at 19:49

## 2017-05-23 NOTE — PROGRESS NOTES
Interdisciplinary Rounds were completed on this patient. Rounds included nursing, clinical care leader, pharmacy, and case management. Patient was doing well without problems. Patient had the following concerns: none. Goals for the day will include: mobilize and monitor vitals for fevers.

## 2017-05-23 NOTE — PROGRESS NOTES
End of Shift Nursing Note    Bedside shift change report given to Brandon RAMIREZ (oncoming nurse) by Paulette De La Cruz (offgoing nurse). Report included the following information SBAR, Kardex, OR Summary, Procedure Summary, Intake/Output, MAR, Accordion and Recent Results. Significant changes during shift:    Pain mgt has been an issue today, a little better this evening. No BM since before admit. No drainage from abscess site. IV abx as ordered. Non-emergent issues for physician to address:   none     Number times ambulated in hallway past shift: 0      Number of times OOB to chair past shift: 0    POD #: 2     Vital Signs:    Temp: 99 °F (37.2 °C)     Pulse (Heart Rate): 88     BP: 99/52     Resp Rate: 16     O2 Sat (%): 96 %    Lines & Drains:        NG tube [] in [] removed [x] not applicable   Drains [] in [] removed [x] not applicable     Skin Integrity:      Wounds: no   Dressings Present: no    Wound Concerns: no      GI:    Current diet:  DIET REGULAR    Nausea: NO  Vomiting: NO  Bowel Sounds: YES  Flatus: YES  Last Bowel Movement: several days ago       Respiratory:  Supplemental O2: No        Incentive Spirometer: YES  Volume: 1000  Coughing and Deep Breathing: YES  Oral Care: YES  Understanding (patient/family education): YES   Getting out of bed: YES  Head of bed elevation: YES    Patient Safety:    Falls Score: 1  Mobility Score: 1  Bed Alarm On? no  Sitter? No      Opportunity for questions and clarification was given to oncoming nurse. Patient bed is in low position, side rails are up x 2, door & observation blinds open as needed, call bell within reach and patient not in distress.     Tommy Burns RN

## 2017-05-23 NOTE — PROGRESS NOTES
1900 Report form Toya Holcomb    2200 High temp. 102.2 Notified oncall hospitalist. Dr Houston Ho and new orders for blood culture and urine culture. Tylenol given.     2300 Recheck Temp 99.9

## 2017-05-23 NOTE — PROGRESS NOTES
Still having some pain but much improved. Had one fever spike last night  Visit Vitals    /78 (BP 1 Location: Right arm, BP Patient Position: At rest)    Pulse 70    Temp 98.4 °F (36.9 °C)    Resp 16    Wt 72.1 kg (159 lb)    SpO2 100%    BMI 26.46 kg/m2       Date 05/22/17 0700 - 05/23/17 0659 05/23/17 0700 - 05/24/17 0659   Shift [id number] 1900-0659 24 Hour Total 0700-1859 1900-0659 24 Hour Total   I  N  T  A  K  E   P.O.          P. O.        I.V.  (mL/kg/hr) 61.7  (0.1) 2607.9  (3) 2669.6  (1.5)         Volume (0.9% sodium chloride infusion) 61.7 2407.9 2469.6         Volume (ampicillin-sulbactam (UNASYN) 3 g in 0.9% sodium chloride (MBP/ADV) 100 mL MBP)  200 200       Shift Total  (mL/kg) 1021.7  (14.2) 3267.9  (45.3) 4289.6  (59.5)      O  U  T  P  U  T   Urine  (mL/kg/hr)            Urine Occurrence(s)  4 x 4 x       Shift Total  (mL/kg)         NET 1021.7 3267.9 4289.6      Weight (kg) 72.1 72.1 72.1 72.1 72.1 72.1     perin clean but tender along the left side of anal canal    A/P continue abx  Will keep her until she has a 24 hour period without fevers

## 2017-05-23 NOTE — CDMP QUERY
Dr. Paola Moya :  Please clarify if this patient is being treated/managed for:    =>Sepsis poa in the setting of perirectal abscess, wbc 16.9---10, temp 103, hr 94--103, cx w/ group A streptococcus req Unasyn, Vanc, Flagyl,  Lvq, bc/urine pending, ivfs @ 125/hr, ID cx, I&D  =>Other Explanation of clinical findings  =>Unable to Determine (no explanation of clinical findings)    The medical record reflects the following clinical findings, treatment, and risk factors:    Risk Factors: 36f adm w/ perirectal abscess  Clinical Indicators:  wbc 16.9---10, temp 103, hr 94--103, cx w/ group A streptococcus, 22 ID pn \" group A streptococcus, which is a bit unusual, but is part of her skin rhiannon. Does produce significant toxins, which can cause more in way of systemic toxicity. \"  Treatment: Unasyn, Vanc, Flagyl,  Lvq, bc/urine pending, ivfs @ 125/hr, ID cx, I&D    Please clarify and document your clinical opinion in the progress notes and discharge summary including the definitive and/or presumptive diagnosis, (suspected or probable), related to the above clinical findings. Please include clinical findings supporting your diagnosis.     Thank [place]

## 2017-05-23 NOTE — PROGRESS NOTES
ID Progress Note    Subjective:   Daily Progress Note: 2017 2:52 PM    DX:       1. Perirectal abscess          Review of Systems: T max of 102.2 last PM, afebrile since. Pain less. + fever, sweats, or chills. -weight loss. +fatigue/malasie. -Rash. -Swollen glands. -Oral lesions. -Photophobia. -Jaundice. - SOB. - cough. - abdominal pain or tenderness. - Nausea or vomiting. - Diarrhea. + rectal pain. No joint inflammation or swelling. No headache or AMS. ROS otherwise negative with greater than 10 systems reviewed. CURRENT ANTIMICROBIALS:  Unasyn      Objective:     Visit Vitals    /78 (BP 1 Location: Right arm, BP Patient Position: At rest)    Pulse 70    Temp 98.4 °F (36.9 °C)    Resp 16    Wt 159 lb (72.1 kg)    LMP 2015 (Approximate)    SpO2 100%    BMI 26.46 kg/m2    O2 Flow Rate (L/min): 2 l/min O2 Device: Room air    Temp (24hrs), Av.5 °F (37.5 °C), Min:97.8 °F (36.6 °C), Max:102.2 °F (39 °C)  Exam:  General: Low grade fever, but not toxic. No exanthem or enanthem. No peripheral adenopathy. Alert and oriented x3. All IV sites are clean and dry. HEENT: EOMI. Anicteric. Oral mucosa normal. Conjunctivae normal.   Chest: Lungs clear to A&P. Regular rhythm without murmurs. Abdomen: Soft without organomegaly, tenderness, or masses. Nondistended. Bowel sounds normal. Dressing over rectal area. Musculoskeletal: No joint inflammation or effusions. No edema. Nail beds normal.  Neurologic: Nonfocal exam.      Data Review: WBC = 10,000. Creat= 0.67. Abscess cultures + for fecal rhiannon and GAS. Blood cultures neg.         Recent Results (from the past 24 hour(s))   CULTURE, BLOOD    Collection Time: 17 12:35 AM   Result Value Ref Range    Special Requests: NO SPECIAL REQUESTS      Culture result: NO GROWTH AFTER 6 HOURS     METABOLIC PANEL, BASIC    Collection Time: 17 12:43 AM   Result Value Ref Range    Sodium 142 136 - 145 mmol/L    Potassium 3.5 3.5 - 5.1 mmol/L Chloride 108 97 - 108 mmol/L    CO2 28 21 - 32 mmol/L    Anion gap 6 5 - 15 mmol/L    Glucose 100 65 - 100 mg/dL    BUN 5 (L) 6 - 20 MG/DL    Creatinine 0.67 0.55 - 1.02 MG/DL    BUN/Creatinine ratio 7 (L) 12 - 20      GFR est AA >60 >60 ml/min/1.73m2    GFR est non-AA >60 >60 ml/min/1.73m2    Calcium 8.2 (L) 8.5 - 10.1 MG/DL   CBC WITH AUTOMATED DIFF    Collection Time: 05/23/17 12:43 AM   Result Value Ref Range    WBC 10.0 3.6 - 11.0 K/uL    RBC 2.87 (L) 3.80 - 5.20 M/uL    HGB 9.5 (L) 11.5 - 16.0 g/dL    HCT 27.4 (L) 35.0 - 47.0 %    MCV 95.5 80.0 - 99.0 FL    MCH 33.1 26.0 - 34.0 PG    MCHC 34.7 30.0 - 36.5 g/dL    RDW 14.1 11.5 - 14.5 %    PLATELET 683 (L) 084 - 400 K/uL    NEUTROPHILS 89 (H) 32 - 75 %    LYMPHOCYTES 5 (L) 12 - 49 %    MONOCYTES 5 5 - 13 %    EOSINOPHILS 1 0 - 7 %    BASOPHILS 0 0 - 1 %    ABS. NEUTROPHILS 8.9 (H) 1.8 - 8.0 K/UL    ABS. LYMPHOCYTES 0.5 (L) 0.8 - 3.5 K/UL    ABS. MONOCYTES 0.5 0.0 - 1.0 K/UL    ABS. EOSINOPHILS 0.1 0.0 - 0.4 K/UL    ABS. BASOPHILS 0.0 0.0 - 0.1 K/UL         Assessment/Plan:    Fever spike last night. No significant fever since. WBC normal.  Pain less. Continue Unasyn with conversion to po Augmentin when fever breaks.

## 2017-05-24 VITALS
OXYGEN SATURATION: 99 % | SYSTOLIC BLOOD PRESSURE: 121 MMHG | DIASTOLIC BLOOD PRESSURE: 88 MMHG | HEART RATE: 62 BPM | WEIGHT: 159 LBS | RESPIRATION RATE: 16 BRPM | BODY MASS INDEX: 26.46 KG/M2 | TEMPERATURE: 97.6 F

## 2017-05-24 LAB
ANION GAP BLD CALC-SCNC: 5 MMOL/L (ref 5–15)
BACTERIA SPEC CULT: NORMAL
BUN SERPL-MCNC: 6 MG/DL (ref 6–20)
BUN/CREAT SERPL: 12 (ref 12–20)
CALCIUM SERPL-MCNC: 8.3 MG/DL (ref 8.5–10.1)
CC UR VC: NORMAL
CHLORIDE SERPL-SCNC: 105 MMOL/L (ref 97–108)
CO2 SERPL-SCNC: 30 MMOL/L (ref 21–32)
CREAT SERPL-MCNC: 0.5 MG/DL (ref 0.55–1.02)
GLUCOSE SERPL-MCNC: 99 MG/DL (ref 65–100)
HCV AB SERPL QL IA: NONREACTIVE
HCV COMMENT,HCGAC: NORMAL
HIV 1+2 AB+HIV1 P24 AG SERPL QL IA: NONREACTIVE
HIV12 RESULT COMMENT, HHIVC: NORMAL
POTASSIUM SERPL-SCNC: 3.5 MMOL/L (ref 3.5–5.1)
SERVICE CMNT-IMP: NORMAL
SODIUM SERPL-SCNC: 140 MMOL/L (ref 136–145)

## 2017-05-24 PROCEDURE — 74011250636 HC RX REV CODE- 250/636: Performed by: INTERNAL MEDICINE

## 2017-05-24 PROCEDURE — 80048 BASIC METABOLIC PNL TOTAL CA: CPT | Performed by: COLON & RECTAL SURGERY

## 2017-05-24 PROCEDURE — 36415 COLL VENOUS BLD VENIPUNCTURE: CPT | Performed by: COLON & RECTAL SURGERY

## 2017-05-24 PROCEDURE — 74011000258 HC RX REV CODE- 258: Performed by: INTERNAL MEDICINE

## 2017-05-24 PROCEDURE — 74011250637 HC RX REV CODE- 250/637: Performed by: COLON & RECTAL SURGERY

## 2017-05-24 PROCEDURE — 74011250636 HC RX REV CODE- 250/636: Performed by: COLON & RECTAL SURGERY

## 2017-05-24 RX ADMIN — HYDROMORPHONE HYDROCHLORIDE 1 MG: 1 INJECTION, SOLUTION INTRAMUSCULAR; INTRAVENOUS; SUBCUTANEOUS at 06:32

## 2017-05-24 RX ADMIN — OXYCODONE HYDROCHLORIDE AND ACETAMINOPHEN 2 TABLET: 5; 325 TABLET ORAL at 14:51

## 2017-05-24 RX ADMIN — Medication 10 ML: at 06:32

## 2017-05-24 RX ADMIN — CLONAZEPAM 0.5 MG: 0.5 TABLET ORAL at 17:37

## 2017-05-24 RX ADMIN — SODIUM CHLORIDE 125 ML/HR: 900 INJECTION, SOLUTION INTRAVENOUS at 09:22

## 2017-05-24 RX ADMIN — SODIUM CHLORIDE 125 ML/HR: 900 INJECTION, SOLUTION INTRAVENOUS at 01:50

## 2017-05-24 RX ADMIN — Medication 10 ML: at 09:20

## 2017-05-24 RX ADMIN — ALPRAZOLAM 1 MG: 0.5 TABLET ORAL at 10:41

## 2017-05-24 RX ADMIN — HYDROMORPHONE HYDROCHLORIDE 1 MG: 1 INJECTION, SOLUTION INTRAMUSCULAR; INTRAVENOUS; SUBCUTANEOUS at 10:48

## 2017-05-24 RX ADMIN — SODIUM CHLORIDE 3 G: 900 INJECTION, SOLUTION INTRAVENOUS at 06:30

## 2017-05-24 RX ADMIN — DOCUSATE CALCIUM 240 MG: 240 CAPSULE, LIQUID FILLED ORAL at 09:20

## 2017-05-24 RX ADMIN — ESCITALOPRAM OXALATE 20 MG: 10 TABLET ORAL at 09:20

## 2017-05-24 RX ADMIN — OXYCODONE HYDROCHLORIDE AND ACETAMINOPHEN 2 TABLET: 5; 325 TABLET ORAL at 04:00

## 2017-05-24 RX ADMIN — SODIUM CHLORIDE 3 G: 900 INJECTION, SOLUTION INTRAVENOUS at 10:41

## 2017-05-24 RX ADMIN — DOCUSATE CALCIUM 240 MG: 240 CAPSULE, LIQUID FILLED ORAL at 17:37

## 2017-05-24 RX ADMIN — OXYCODONE HYDROCHLORIDE AND ACETAMINOPHEN 2 TABLET: 5; 325 TABLET ORAL at 09:19

## 2017-05-24 RX ADMIN — SODIUM CHLORIDE 3 G: 900 INJECTION, SOLUTION INTRAVENOUS at 16:10

## 2017-05-24 RX ADMIN — CLONAZEPAM 0.5 MG: 0.5 TABLET ORAL at 09:19

## 2017-05-24 NOTE — PROGRESS NOTES
End of Shift Nursing Note    Bedside shift change report given to Shaheed Everett (oncoming nurse) by Abi Riggs RN (offgoing nurse). Report included the following information SBAR. Zone Phone:       Significant changes during shift:    PRN meds for pain given thru the night,c/o Headaches and pain per buttocks   Non-emergent issues for physician to address:   Pt would like to speak to MD for D/C?,swelling of perineum and buttocks? Number times ambulated in hallway past shift: 0      Number of times OOB to chair past shift: 1    POD #:      Vital Signs:    Temp: 97.8 °F (36.6 °C)     Pulse (Heart Rate): 65     BP: 126/71     Resp Rate: 18     O2 Sat (%): 96 %    Lines & Drains:     Urinary Catheter? No       NG tube [] in [] removed [x] not applicable   Drains [] in [] removed [x] not applicable     Skin Integrity:      Wounds: yesDressings Present: no    Wound Concerns: yes  ,more swelling? GI:    Current diet:  DIET REGULAR    Nausea: NO  Vomiting: NO  Bowel Sounds: YES  Flatus: YES  Last Bowel Movement: several days ago   Appearance:     Respiratory:  Supplemental O2: No       Incentive Spirometer: YES  Volume:   Coughing and Deep Breathing: YES  Oral Care: YES  Understanding (patient/family education): YES   Getting out of bed: YES  Head of bed elevation: YES    Patient Safety:    Falls Score: 1  Mobility Score: 4  Bed Alarm On? No  Sitter? No      Opportunity for questions and clarification was given to oncoming nurse. Patient bed is in low position, side rails are up x 2, door & observation blinds open as needed, call bell within reach and patient not in distress.     Crow Marquez

## 2017-05-24 NOTE — PROGRESS NOTES
No fevers in >24 hours. Having some labial discomfort and swelling  Visit Vitals    /88 (BP 1 Location: Right arm, BP Patient Position: Sitting)    Pulse 62    Temp 97.6 °F (36.4 °C)    Resp 16    Wt 72.1 kg (159 lb)    SpO2 99%    BMI 26.46 kg/m2       Date 05/23/17 0700 - 05/24/17 0659 05/24/17 0700 - 05/25/17 0659   Shift 0700-1859 1900-0659 24 Hour Total 0700-1859 1900-0659 24 Hour Total   I  N  T  A  K  E   P.O.  450 450         P. O.  450 450       I.V.  (mL/kg/hr)  1800  (2.1) 1800  (1)         Volume (0.9% sodium chloride infusion)  1500 1500         Volume (ampicillin-sulbactam (UNASYN) 3 g in 0.9% sodium chloride (MBP/ADV) 100 mL)  300 300       Shift Total  (mL/kg)  2250  (31.2) 2250  (31.2)      O  U  T  P  U  T   Urine  (mL/kg/hr)            Urine Occurrence(s)  4 x 4 x       Shift Total  (mL/kg)         NET  2250 2250      Weight (kg) 72.1 72.1 72.1 72.1 72.1 72.1     abd soft  perin clean  Bruising around anus.  Slight tenderness around left side of anus    A/p abx seem to be working  Home on augmentin  Follow up with me in the office next week

## 2017-05-24 NOTE — PROGRESS NOTES
Gynecology Consult     Name: Jocelyn Mcknight MRN: [id number] [id number]: [id number]    YOB: 1980  Age: 39 y.o. Sex: female       Subjective:      Chief complaint:  Vulvar infection    Bry Kwon is a 39 y.o.  female with a history of hysterectomy and post op day 4 from I&D of perirectal abscess. Gyn consultation due to complaints of left labial and vulvar swelling. Pt reports pain in this area for one day. Pt is on IV abx and has shown clinical improvement. She continues to be afebrile. CT scan findings include interval decrease in size of perirectal abscess on 5/22. No previous treatment measures. The current method of family planning is status post hysterectomy. OB History     No data available        Past Medical History:   Diagnosis Date    ADHD (attention deficit hyperactivity disorder)     Anxiety     Anxiety     Asthma     CHILDHOOD    Chronic back pain     Chronic pain     DISC ISSUES IN BACK    Psychiatric disorder     ANXIETY     Past Surgical History:   Procedure Laterality Date    BREAST SURGERY PROCEDURE UNLISTED  2002    reduction    HX BREAST REDUCTION      HX CYST REMOVAL Right     ganglion cyst removed from right wrist    HX GYN      BTL    HX GYN      D&C X 2    HX GYN  2012    Hysterectomy    HX OTHER SURGICAL  5/2013    cyst removed from right hand    HX TONSIL AND ADENOIDECTOMY      HX TONSILLECTOMY      HX WISDOM TEETH EXTRACTION       Social History     Occupational History    Not on file. Social History Main Topics    Smoking status: Current Some Day Smoker     Packs/day: 0.25     Years: 19.00    Smokeless tobacco: Current User      Comment: smokes vapor cig     Alcohol use 0.0 oz/week     0 Standard drinks or equivalent per week      Comment: Socially.      Drug use: No    Sexual activity: Yes     Partners: Male     Birth control/ protection: Surgical     Family History   Problem Relation Age of Onset    Anxiety Mother     Hypertension Mother  COPD Mother     Arthritis-osteo Mother     Psychiatric Disorder Mother     Hypertension Father     Cancer Brother     Schizophrenia Maternal Aunt     Heart Disease Maternal Grandfather     Stroke Maternal Grandfather     Cancer Paternal Grandmother     Anesth Problems Neg Hx         Allergies   Allergen Reactions    Other Food Itching     Fresh fruit, cannot be specific    Codeine Hives     Allergic to tylenol with codeine combo    Tylenol-Codeine #2 Hives    Nuts [Tree Nut] Itching     Prior to Admission medications    Medication Sig Start Date End Date Taking? Authorizing Provider   amoxicillin-clavulanate (AUGMENTIN) 875-125 mg per tablet Take 1 Tab by mouth two (2) times a day for 7 days. 5/23/17 5/30/17 Yes Janet Herrera MD   oxyCODONE-acetaminophen (PERCOCET) 5-325 mg per tablet Take 1 Tab by mouth every four (4) hours as needed for Pain. Max Daily Amount: 6 Tabs. 5/20/17  Yes Janet Herrera MD   clonazePAM (KLONOPIN) 0.5 mg tablet Take 0.5 mg by mouth two (2) times a day. Yes Phys Other, MD   Lisdexamfetamine (VYVANSE) 50 mg cap Take by mouth daily. Yes Phys Other, MD   escitalopram oxalate (LEXAPRO) 20 mg tablet Take 20 mg by mouth daily. Yes Phys Other, MD   ALPRAZolam Mariam Bunch) 1 mg tablet Take 1-2 mg by mouth three (3) times daily as needed. 3/18/16  Yes Historical Provider   lidocaine (URO-JET) 2 % jelp jelly Apply to affected area 5/19/17   Chon Rankin   benzocaine-resorcinol-aloe-vit E-vit A&D (VAGISIL) 20-3 % topical cream Apply 3 g to affected area three (3) times daily. 5/19/17   Chon Rankin   polyethylene glycol (MIRALAX) 17 gram/dose powder Take 17 g by mouth daily. 1 tablespoon with 8 oz of water daily 5/19/17   Chon Rankin   docusate calcium (SURFAK) 240 mg capsule Take 1 Cap by mouth two (2) times a day for 90 days. 5/19/17 8/17/17  JUDI Rankin   albuterol (VENTOLIN HFA) 90 mcg/actuation inhaler Take 2 Puffs by inhalation every six (6) hours as needed for Wheezing. 4/30/17   Amparo Banks MD        Review of Systems  A comprehensive review of systems was negative except for that written in the History of Present Illness. Objective:     Vitals:    05/23/17 2321 05/24/17 0400 05/24/17 0812 05/24/17 1044   BP: 122/68 126/71 135/82 121/88   Pulse: 72 65 (!) 56 62   Resp: 18 18 16 16   Temp: 99 °F (37.2 °C) 97.8 °F (36.6 °C) 97.6 °F (36.4 °C) 97.6 °F (36.4 °C)   SpO2: 97% 96% 98% 99%   Weight:           Physical Exam:  Patient without distress. Abdomen: soft, nontender  External Genitalia: normal general appearance but left labia extending downward to left gluteal fold is slightly edematous with mild erythema. Area is tender to palpation  Urinary system: urethral meatus normal  Vagina: normal mucosa without prolapse or lesions    Assessment/Plan:     Active Problems:    Perirectal abscess (5/22/2017)       1. POD 4 s/p I&D of perirectal abscess with left sided vulvar swelling. As pt is afebrile and improved on IV abx with normal WBC, pt may be discharged home on outpatient regimen of abx. Possible inflammation/ involved area of infection from perirectal abscess. Pt to continue oral abx and follow up in the office on Friday for further monitoring of the vulva. 2.  Recommendations reviewed with the patient  3. Pt to call for fever, increased swelling/pain/ or erythema of the vulva while on abx. 4.  Pt abstain from sexual intercourse  5.   Thank you for your consultation; [address] [place] [address] [address] [phone]      Signed By:  Deuce Smith MD     May 24, 2017

## 2017-05-24 NOTE — PROGRESS NOTES
Interdisciplinary Rounds were completed on this patient. Rounds included nursing, clinical care leader, pharmacy, and case management. Patient was up in the restroom. Patient had the following concerns: pain, bruising, and swelling to [place]o-rectal area. Goals for the day will include: mobilize and manage pain.

## 2017-05-25 ENCOUNTER — PATIENT OUTREACH (OUTPATIENT)
Dept: INTERNAL MEDICINE CLINIC | Age: 37
End: 2017-05-25

## 2017-05-25 NOTE — PROGRESS NOTES
Hospital Discharge Follow-Up      Date/Time:  2017 10:07 AM    Patient listed on discharge [facility]) report on 17. RRAT score: Low Risk            4       Total Score        4 More than 1 Admission in calendar year        Criteria that do not apply:    Relationship with PCP    Patient Living Status    Patient Length of Stay > 5    Patient Insurance is Medicare, Medicaid or Self Pay    Charlson Comorbidity Score        Brief hospitalization history:  Patient was admitted to [facility] on 17 and discharged on 17 for perianal abscess. Patient went to ED at [facility] on 17 for rectal pain. Rectal exam and anoscope exam performed. Patient was sent home with lidocaine jelly and Vagisil cream. Patient came back to ED on 17 for worsening rectal pain, onset of fever, chills, and back pain. CT showed:  Peroneal assessment shows interval mural thickening of the   anus with hypoattenuating area posterior to the anus measuring 2.4 x 1.8 cm   transverse and 2.5 cm craniocaudal. Finding is suspicious for an abscess. No   perianal fat stranding demonstrated. Dr. Angel Olivares (surgeon) consulted. Patient had incision and drainage of perianal abscess and rigid proctoscopy. Patient continued to have fever after IV antibiotics, so infectious disease (Dr. Amol Perkins) was consulted. Patient developed left labial and vulvar swelling so Veronica Pollock (Dr. Key Campos) was consulted. Nurse Navigator(NN) contacted the patient by telephone to perform post hospital discharge assessment. Verified  and address with patient as identifiers. Provided introduction to self, and explanation of the Nurses Navigator role. Patient is now fever free and has checked her temperature this morning without fever. Patient is taking oral antibiotics as prescribed. NN discussed importance of completing the full course of antibiotics as prescribed. Patient states that she doing okay, but is still having some pain.  She is taking Percocet for pain. NN advised patient not to drive while taking Percocet. Patient had a BM yesterday, but states is was small and she feels real distended and bloated. She is taking Miralax and is going to  a stool softener today. She says that she has been going to [facility] instead of coming to her PCP. NN explained that she has not been here since last year and needs to come in for an appointment. She does not want to make an appointment at this time and says that she is going to call back to make an appointment with Dr. Eliseo Sahni. She does not see Dr. Deborah Garibay anymore. She started going to [facility] and is seeing Dr. Ruddy Page now. Patient given an opportunity to ask questions and they have no further questions or concerns at this time. No other clinical/social/functional needs noted. The patient agrees to contact the PCP office for questions related to their healthcare. NN provided contact information to the patient for future reference. The patient expressed thanks, offered no additional questions and ended the call. Diet:   Patient reports: Regular Diet    Activity:    Patient reports: works full time at [employer]. She lives alone, but has 3 children that stays with her some. She says that she is in the middle of a divorce/custody cordon. She drives and does not have any difficulties with mobility, gait, or performing ADL's. Medication:   Performed medication reconciliation with patient, and patient verbalizes understanding of administration of home medications. There were no barriers to obtaining medications identified at this time. Support system:  patient, father and friend    Discharge Instructions :  Reviewed discharge instructions with patient. Patient verbalizes understanding of discharge instructions and follow-up care.        Red Flags:  Fever >100.4, increase in rectal pain, increase in vaginal swelling or pain, rectal bleeding  Reviewed red flags with patient, and patient verbalizes understanding. Labs Reviewed:  Recent Labs      05/23/17   0043   WBC  10.0   HGB  9.5*   HCT  27.4*   PLT  110*     Recent Labs      05/24/17   0130  05/23/17   0043   NA  140  142   K  3.5  3.5   CL  105  108   CO2  30  28   GLU  99  100   BUN  6  5*   CREA  0.50*  0.67   CA  8.3*  8.2*       PCP/Specialist follow up: Patient is not scheduled for any follow up appointments. She states that she is going to call Dr. Yaron Gregorio office to schedule a 1 week follow up and [facility]'s to schedule a follow up with them. NN attempted to make appointment with Dr. Fox Molina, but the patient declined at this time as she is not sure what her work schedule is going to be like and wants to call back to schedule appointment once she gets her work schedule. Goals      Antibiotic therapy            Patient will complete full course of antibiotics as prescribed.  Attends follow-up appointments as directed. Patient will call to make appointment with Dr. Petrona Fuentes, and Dr. Fox Molina.  Fever            Patient will continue to take temperature and remain fever free.

## 2017-05-29 LAB
BACTERIA SPEC CULT: NORMAL
SERVICE CMNT-IMP: NORMAL

## 2017-06-02 ENCOUNTER — PATIENT OUTREACH (OUTPATIENT)
Dept: INTERNAL MEDICINE CLINIC | Age: 37
End: 2017-06-02

## 2017-06-02 NOTE — PROGRESS NOTES
NN spoke with patient who states that she is feeling much better. She says that the pain has gotten much better as well. She had her follow up with [facility] a few days ago and has a follow up with Dr. Shakir Bowman on 6/22/17. She was cleared to go back to work and started back yesterday. She says that she really wants to make an appointment to come in to see Dr. Matt Turner, but she has a lot going on right now. She is in the middle of a divorce and custody cordon and will call to make an appointment once that settles down. She has finished her antibiotic therapy as prescribed. The patient does not have any questions or concerns at this time, but has the NN contact information for future questions or needs that arise.

## 2017-06-02 NOTE — DISCHARGE SUMMARY
Physician Discharge Summary     Patient ID:  Corona Shabazz  [id number]  55 y.o.  1980    Admit Date: 5/20/2017    Discharge Date: 5/24/2017    * Admission Diagnoses: perirectal abscess  perirectal abscess  Perirectal abscess    * Discharge Diagnoses:    Hospital Problems as of 5/24/2017  Date Reviewed: 4/14/2016          Codes Class Noted - Resolved POA    Perirectal abscess ICD-10-CM: K61.1  ICD-9-CM: 591  5/22/2017 - Present Unknown               Admission Condition: Good    * Discharge Condition: good    * Procedures: Procedure(s):  [address] [facility] Course:   Normal hospital course for this procedure. Consults: Infectious Disease    Significant Diagnostic Studies: microbiology: wound culture: positive    * Disposition: Home    Discharge Medications:   Discharge Medication List as of 5/24/2017 12:42 PM      START taking these medications    Details   amoxicillin-clavulanate (AUGMENTIN) 875-125 mg per tablet Take 1 Tab by mouth two (2) times a day for 7 days. , Print, Disp-14 Tab, R-0      oxyCODONE-acetaminophen (PERCOCET) 5-325 mg per tablet Take 1 Tab by mouth every four (4) hours as needed for Pain. Max Daily Amount: 6 Tabs., Print, Disp-60 Tab, R-0         CONTINUE these medications which have NOT CHANGED    Details   clonazePAM (KLONOPIN) 0.5 mg tablet Take 0.5 mg by mouth two (2) times a day., Historical Med      lidocaine (URO-JET) 2 % jelp jelly Apply to affected area, Normal, Disp-20 mL, R-0      benzocaine-resorcinol-aloe-vit E-vit A&D (VAGISIL) 20-3 % topical cream Apply 3 g to affected area three (3) times daily. , Normal, Disp-28 g, R-0      polyethylene glycol (MIRALAX) 17 gram/dose powder Take 17 g by mouth daily. 1 tablespoon with 8 oz of water daily, Normal, Disp-119 g, R-0      docusate calcium (SURFAK) 240 mg capsule Take 1 Cap by mouth two (2) times a day for 90 days. , Normal, Disp-30 Cap, R-0      Lisdexamfetamine (VYVANSE) 50 mg cap Take by mouth daily. , Historical Med      escitalopram oxalate (LEXAPRO) 20 mg tablet Take 20 mg by mouth daily. , Historical Med      albuterol (VENTOLIN HFA) 90 mcg/actuation inhaler Take 2 Puffs by inhalation every six (6) hours as needed for Wheezing., Normal, Disp-1 Inhaler, R-0      ALPRAZolam (XANAX) 1 mg tablet Take 1-2 mg by mouth three (3) times daily as needed., Historical Med, R-2             * Follow-up Care/Patient Instructions: Activity: Activity as tolerated  Diet: Regular Diet  Wound Care: Keep wound clean and dry    Follow-up Information     Follow up With Details Comments [facility], [address] [place] [address]  [phone]              Signed:   Gilbert Jean MD  6/2/2017  7:56 AM

## 2017-06-26 ENCOUNTER — PATIENT OUTREACH (OUTPATIENT)
Dept: INTERNAL MEDICINE CLINIC | Age: 37
End: 2017-06-26

## 2017-08-22 ENCOUNTER — APPOINTMENT (OUTPATIENT)
Dept: ULTRASOUND IMAGING | Age: 37
End: 2017-08-22
Attending: EMERGENCY MEDICINE
Payer: COMMERCIAL

## 2017-08-22 ENCOUNTER — APPOINTMENT (OUTPATIENT)
Dept: NUCLEAR MEDICINE | Age: 37
End: 2017-08-22
Attending: EMERGENCY MEDICINE
Payer: COMMERCIAL

## 2017-08-22 ENCOUNTER — HOSPITAL ENCOUNTER (EMERGENCY)
Age: 37
Discharge: HOME | End: 2017-08-22
Attending: EMERGENCY MEDICINE
Payer: COMMERCIAL

## 2017-08-22 VITALS
BODY MASS INDEX: 23.69 KG/M2 | OXYGEN SATURATION: 99 % | DIASTOLIC BLOOD PRESSURE: 69 MMHG | SYSTOLIC BLOOD PRESSURE: 112 MMHG | HEART RATE: 78 BPM | TEMPERATURE: 97.8 F | HEIGHT: 65 IN | RESPIRATION RATE: 15 BRPM | WEIGHT: 142.2 LBS

## 2017-08-22 DIAGNOSIS — K52.9 GASTROENTERITIS, ACUTE: Primary | ICD-10-CM

## 2017-08-22 DIAGNOSIS — R10.11 RUQ PAIN: ICD-10-CM

## 2017-08-22 LAB
ALBUMIN SERPL-MCNC: 3.8 G/DL (ref 3.5–5)
ALBUMIN/GLOB SERPL: 1.1 {RATIO} (ref 1.1–2.2)
ALP SERPL-CCNC: 57 U/L (ref 45–117)
ALT SERPL-CCNC: 15 U/L (ref 12–78)
ANION GAP SERPL CALC-SCNC: 6 MMOL/L (ref 5–15)
AST SERPL-CCNC: 12 U/L (ref 15–37)
BASOPHILS # BLD: 0 K/UL (ref 0–0.1)
BASOPHILS NFR BLD: 0 % (ref 0–1)
BILIRUB SERPL-MCNC: 0.2 MG/DL (ref 0.2–1)
BUN SERPL-MCNC: 19 MG/DL (ref 6–20)
BUN/CREAT SERPL: 29 (ref 12–20)
CALCIUM SERPL-MCNC: 8.7 MG/DL (ref 8.5–10.1)
CHLORIDE SERPL-SCNC: 102 MMOL/L (ref 97–108)
CO2 SERPL-SCNC: 30 MMOL/L (ref 21–32)
CREAT SERPL-MCNC: 0.65 MG/DL (ref 0.55–1.02)
EOSINOPHIL # BLD: 0.1 K/UL (ref 0–0.4)
EOSINOPHIL NFR BLD: 2 % (ref 0–7)
ERYTHROCYTE [DISTWIDTH] IN BLOOD BY AUTOMATED COUNT: 13 % (ref 11.5–14.5)
GLOBULIN SER CALC-MCNC: 3.4 G/DL (ref 2–4)
GLUCOSE SERPL-MCNC: 92 MG/DL (ref 65–100)
HCT VFR BLD AUTO: 38.5 % (ref 35–47)
HGB BLD-MCNC: 13.1 G/DL (ref 11.5–16)
LIPASE SERPL-CCNC: 179 U/L (ref 73–393)
LYMPHOCYTES # BLD: 2.4 K/UL (ref 0.8–3.5)
LYMPHOCYTES NFR BLD: 32 % (ref 12–49)
MCH RBC QN AUTO: 31.8 PG (ref 26–34)
MCHC RBC AUTO-ENTMCNC: 34 G/DL (ref 30–36.5)
MCV RBC AUTO: 93.4 FL (ref 80–99)
MONOCYTES # BLD: 0.5 K/UL (ref 0–1)
MONOCYTES NFR BLD: 7 % (ref 5–13)
NEUTS SEG # BLD: 4.4 K/UL (ref 1.8–8)
NEUTS SEG NFR BLD: 59 % (ref 32–75)
PLATELET # BLD AUTO: 168 K/UL (ref 150–400)
POTASSIUM SERPL-SCNC: 3.6 MMOL/L (ref 3.5–5.1)
PROT SERPL-MCNC: 7.2 G/DL (ref 6.4–8.2)
RBC # BLD AUTO: 4.12 M/UL (ref 3.8–5.2)
SODIUM SERPL-SCNC: 138 MMOL/L (ref 136–145)
WBC # BLD AUTO: 7.5 K/UL (ref 3.6–11)

## 2017-08-22 PROCEDURE — 85025 COMPLETE CBC W/AUTO DIFF WBC: CPT | Performed by: EMERGENCY MEDICINE

## 2017-08-22 PROCEDURE — 80053 COMPREHEN METABOLIC PANEL: CPT | Performed by: EMERGENCY MEDICINE

## 2017-08-22 PROCEDURE — 74011250636 HC RX REV CODE- 250/636

## 2017-08-22 PROCEDURE — 76705 ECHO EXAM OF ABDOMEN: CPT

## 2017-08-22 PROCEDURE — 74011000250 HC RX REV CODE- 250: Performed by: EMERGENCY MEDICINE

## 2017-08-22 PROCEDURE — 96365 THER/PROPH/DIAG IV INF INIT: CPT

## 2017-08-22 PROCEDURE — 74011000258 HC RX REV CODE- 258: Performed by: EMERGENCY MEDICINE

## 2017-08-22 PROCEDURE — A9537 TC99M MEBROFENIN: HCPCS

## 2017-08-22 PROCEDURE — 74011250636 HC RX REV CODE- 250/636: Performed by: EMERGENCY MEDICINE

## 2017-08-22 PROCEDURE — 96361 HYDRATE IV INFUSION ADD-ON: CPT

## 2017-08-22 PROCEDURE — 83690 ASSAY OF LIPASE: CPT | Performed by: EMERGENCY MEDICINE

## 2017-08-22 PROCEDURE — 99284 EMERGENCY DEPT VISIT MOD MDM: CPT

## 2017-08-22 PROCEDURE — 96375 TX/PRO/DX INJ NEW DRUG ADDON: CPT

## 2017-08-22 PROCEDURE — 36415 COLL VENOUS BLD VENIPUNCTURE: CPT | Performed by: EMERGENCY MEDICINE

## 2017-08-22 RX ORDER — DIAZEPAM 10 MG/2ML
2 INJECTION INTRAMUSCULAR
Status: COMPLETED | OUTPATIENT
Start: 2017-08-22 | End: 2017-08-22

## 2017-08-22 RX ORDER — ONDANSETRON 2 MG/ML
4 INJECTION INTRAMUSCULAR; INTRAVENOUS
Status: COMPLETED | OUTPATIENT
Start: 2017-08-22 | End: 2017-08-22

## 2017-08-22 RX ORDER — ONDANSETRON 4 MG/1
4 TABLET, ORALLY DISINTEGRATING ORAL
Qty: 10 TAB | Refills: 0 | Status: SHIPPED | OUTPATIENT
Start: 2017-08-22 | End: 2020-08-27

## 2017-08-22 RX ORDER — DICYCLOMINE HYDROCHLORIDE 20 MG/1
20 TABLET ORAL EVERY 6 HOURS
Qty: 20 TAB | Refills: 0 | Status: SHIPPED | OUTPATIENT
Start: 2017-08-22 | End: 2017-08-30

## 2017-08-22 RX ORDER — FAMOTIDINE 20 MG/1
20 TABLET, FILM COATED ORAL 2 TIMES DAILY
Qty: 20 TAB | Refills: 0 | Status: SHIPPED | OUTPATIENT
Start: 2017-08-22 | End: 2020-08-27

## 2017-08-22 RX ORDER — MORPHINE SULFATE 10 MG/ML
4 INJECTION, SOLUTION INTRAMUSCULAR; INTRAVENOUS
Status: COMPLETED | OUTPATIENT
Start: 2017-08-22 | End: 2017-08-22

## 2017-08-22 RX ORDER — KETOROLAC TROMETHAMINE 30 MG/ML
30 INJECTION, SOLUTION INTRAMUSCULAR; INTRAVENOUS
Status: COMPLETED | OUTPATIENT
Start: 2017-08-22 | End: 2017-08-22

## 2017-08-22 RX ORDER — LIDOCAINE HCL/PF 100 MG/5ML
100 SYRINGE (ML) INTRAVENOUS ONCE
Status: DISCONTINUED | OUTPATIENT
Start: 2017-08-22 | End: 2017-08-22

## 2017-08-22 RX ORDER — OXYCODONE HYDROCHLORIDE 5 MG/1
5 TABLET ORAL
Qty: 6 TAB | Refills: 0 | Status: SHIPPED | OUTPATIENT
Start: 2017-08-22 | End: 2017-08-30

## 2017-08-22 RX ADMIN — SODIUM CHLORIDE 1000 ML: 900 INJECTION, SOLUTION INTRAVENOUS at 04:21

## 2017-08-22 RX ADMIN — ONDANSETRON 4 MG: 2 INJECTION INTRAMUSCULAR; INTRAVENOUS at 11:10

## 2017-08-22 RX ADMIN — KETOROLAC TROMETHAMINE 30 MG: 30 INJECTION, SOLUTION INTRAMUSCULAR at 04:24

## 2017-08-22 RX ADMIN — SINCALIDE 1.29 MCG: 5 INJECTION, POWDER, LYOPHILIZED, FOR SOLUTION INTRAVENOUS at 10:05

## 2017-08-22 RX ADMIN — MORPHINE SULFATE 4 MG: 10 INJECTION INTRAMUSCULAR; INTRAVENOUS; SUBCUTANEOUS at 11:10

## 2017-08-22 RX ADMIN — DIAZEPAM 2 MG: 5 INJECTION, SOLUTION INTRAMUSCULAR; INTRAVENOUS at 04:22

## 2017-08-22 RX ADMIN — LIDOCAINE HYDROCHLORIDE 100 MG: 20 INJECTION, SOLUTION INTRAVENOUS at 05:28

## 2017-08-22 NOTE — ED NOTES
Patient discharged by Dr. Jenae Barr. Patient provided with discharge instructions Rx and instructions on follow up care. Patient out of ED ambulatory accompanied by family.

## 2017-08-22 NOTE — ED NOTES
RN spoke with [facility], and they reported they can't get the pt in for her study until 0930.  RN updated MD.

## 2017-08-22 NOTE — PROGRESS NOTES
Please keep patient npo and give no pain meds for Nuclear Med gallbladder study around 9:30am today.

## 2017-08-22 NOTE — ED PROVIDER NOTES
HPI Comments: Fernando Asif is a 40 y.o. female with PMhx significant for ADHD, anxiety, asthma, chronic back pain who presents ambulatory to the ED with cc of gradually worsening RUQ abdominal pain that onset 2 days ago. She reports associated nausea and back pain. Pt reports a hx of gallbladder complications noting that her current symptoms are similar. She states that her symptoms exacerbated beyond toleration today. Pt states that her symptoms are exacerbated with PO of certain foods. She states that she was seen by GI today for evaluation for her GI complications and reports that she is going to have an gallbladder US and endoscopy but reports that the test have yet to be scheduled. Per cahrt review pt was seen at [facility] on 05/19/2017 for a rectal abscess. Per chart review pt was admitted on 05/20/2017 for her rectal abscess and was placed on Augmentin following the I&D procedure. Pt reports being diagnosed with C. Diff following completion of her antibiotic. She specifically denies any fevers, chills, vomiting, chest pain, shortness of breath, headache, rash, diarrhea, sweating or weight loss. Social history significant for: + Tobacco (1.58FNN), + EtOH, - Illicit drug use    PCP: German Gurrola MD    There are no other complaints, changes or physical findings at this time. Written by KARISHMA Wayne, as dictated by [employer] Rashad Ahumada MD.       The history is provided by the patient. No  was used.         Past Medical History:   Diagnosis Date    ADHD (attention deficit hyperactivity disorder)     Anxiety     Anxiety     Asthma     CHILDHOOD    Chronic back pain     Chronic pain     DISC ISSUES IN BACK    Psychiatric disorder     ANXIETY       Past Surgical History:   Procedure Laterality Date    BREAST SURGERY PROCEDURE UNLISTED  2002    reduction    HX BREAST REDUCTION      HX CYST REMOVAL Right     ganglion cyst removed from right wrist    HX GYN      BTL    HX GYN D&C X 2    HX GYN  2012    Hysterectomy    HX OTHER SURGICAL  5/2013    cyst removed from right hand    HX TONSIL AND ADENOIDECTOMY      HX TONSILLECTOMY      HX WISDOM TEETH EXTRACTION           Family History:   Problem Relation Age of Onset    Anxiety Mother     Hypertension Mother     COPD Mother     Arthritis-osteo Mother     Psychiatric Disorder Mother     Hypertension Father     Cancer Brother     Schizophrenia Maternal Aunt     Heart Disease Maternal Grandfather     Stroke Maternal Grandfather     Cancer Paternal Grandmother     Anesth Problems Neg Hx        Social History     Social History    Marital status: LEGALLY      Spouse name: N/A    Number of children: N/A    Years of education: N/A     Occupational History    Not on file. Social History Main Topics    Smoking status: Current Some Day Smoker     Packs/day: 0.25     Years: 19.00    Smokeless tobacco: Current User      Comment: smokes vapor cig     Alcohol use 0.0 oz/week     0 Standard drinks or equivalent per week      Comment: Socially.  Drug use: No    Sexual activity: Yes     Partners: Male     Birth control/ protection: Surgical     Other Topics Concern    Not on file     Social History Narrative         ALLERGIES: Other food; Codeine; Tylenol-codeine #2; and Nuts [tree nut]    Review of Systems   Constitutional: Negative. Negative for activity change, appetite change, chills, fatigue, fever and unexpected weight change. HENT: Negative. Negative for congestion, hearing loss, rhinorrhea, sneezing and voice change. Eyes: Negative. Negative for pain and visual disturbance. Respiratory: Negative. Negative for apnea, cough, choking, chest tightness and shortness of breath. Cardiovascular: Negative. Negative for chest pain and palpitations. Gastrointestinal: Positive for abdominal pain (RUQ) and nausea. Negative for abdominal distention, blood in stool, diarrhea and vomiting. Genitourinary: Negative. Negative for difficulty urinating, flank pain, frequency and urgency. No discharge   Musculoskeletal: Positive for back pain. Negative for arthralgias, myalgias and neck stiffness. Skin: Negative. Negative for color change and rash. Neurological: Negative. Negative for dizziness, seizures, syncope, speech difficulty, weakness, numbness and headaches. Hematological: Negative for adenopathy. Psychiatric/Behavioral: Negative. Negative for agitation, behavioral problems, dysphoric mood and suicidal ideas. The patient is not nervous/anxious. Patient Vitals for the past 12 hrs:   Temp Pulse Resp BP SpO2   08/22/17 0715 - 78 15 112/69 99 %   08/22/17 0645 - - - 104/63 97 %   08/22/17 0630 - - - 104/56 97 %   08/22/17 0615 - - - 106/60 100 %   08/22/17 0600 - - - 106/58 100 %   08/22/17 0545 - - - 98/62 100 %   08/22/17 0531 - - - - 100 %   08/22/17 0515 - - - 111/75 100 %   08/22/17 0500 - - - 121/76 99 %   08/22/17 0445 - - - 121/79 100 %   08/22/17 0430 - - - 120/84 100 %   08/22/17 0423 - - - (!) 125/96 100 %   08/22/17 0330 - - - - 100 %   08/22/17 0314 97.8 °F (36.6 °C) 78 17 (!) 160/109 99 %     Physical Exam   Constitutional: She is oriented to person, place, and time. She appears well-developed and well-nourished. No distress. HENT:   Head: Normocephalic and atraumatic. Mouth/Throat: Oropharynx is clear and moist. No oropharyngeal exudate. Eyes: Conjunctivae and EOM are normal. Pupils are equal, round, and reactive to light. Right eye exhibits no discharge. Left eye exhibits no discharge. Neck: Normal range of motion. Neck supple. Cardiovascular: Normal rate, regular rhythm and intact distal pulses. Exam reveals no gallop and no friction rub. No murmur heard. Pulmonary/Chest: Effort normal and breath sounds normal. No respiratory distress. She has no wheezes. She has no rales. She exhibits no tenderness. Abdominal: Soft.  Bowel sounds are normal. She exhibits no distension and no mass. There is no rebound and no guarding. Mild TTP in the RUQ   Musculoskeletal: Normal range of motion. She exhibits no edema. Lymphadenopathy:     She has no cervical adenopathy. Neurological: She is alert and oriented to person, place, and time. No cranial nerve deficit. Coordination normal.   Skin: Skin is warm and dry. No rash noted. No erythema. Psychiatric: She has a normal mood and affect. Nursing note and vitals reviewed. MDM  Number of Diagnoses or Management Options  Diagnosis management comments:   DDx: Cholecystitis, Pancreatitis, constipation       Amount and/or Complexity of Data Reviewed  Clinical lab tests: ordered and reviewed  Tests in the radiology section of CPT®: ordered and reviewed  Review and summarize past medical records: yes    Patient Progress  Patient progress: stable    Procedures    PROGRESS NOTE:  4:50 AM  Pt has been re-evaluated. Equivocal US, Will assess with HIDA scan. Written by KARISHMA Short, as dictated by Geovanna Williamson MD    SIGN OUT:  6:35 AM  Patient's presentation, labs/imaging and plan of care was reviewed with Timi Beaulieu MD as part of sign out. They will continue with plan of care as part of the plan discussed with the patient. Timi Beaulieu MD's assistance in completion of this plan is greatly appreciated but it should be noted that I will be the provider of record for this patient. Ayush Williamson MD    Progress Note:  11:01 AM  The patient has returned from Nuclear Medicine, will order pain medication at this time. Written by KARISHMA Sheets, as dictated by Timi Beaulieu MD.    Progress Note:  11:15 AM  The patient was informed of her HIDA scan results, and she conveys her understanding of these results. Recommended the patient have close follow up with GI.   Written by KARISHMA Sheets, as dictated by Timi Beaulieu MD.    LABORATORY TESTS:  Recent Results (from the past 12 hour(s))   CBC WITH AUTOMATED DIFF    Collection Time: 08/22/17  3:43 AM   Result Value Ref Range    WBC 7.5 3.6 - 11.0 K/uL    RBC 4.12 3.80 - 5.20 M/uL    HGB 13.1 11.5 - 16.0 g/dL    HCT 38.5 35.0 - 47.0 %    MCV 93.4 80.0 - 99.0 FL    MCH 31.8 26.0 - 34.0 PG    MCHC 34.0 30.0 - 36.5 g/dL    RDW 13.0 11.5 - 14.5 %    PLATELET 230 491 - 882 K/uL    NEUTROPHILS 59 32 - 75 %    LYMPHOCYTES 32 12 - 49 %    MONOCYTES 7 5 - 13 %    EOSINOPHILS 2 0 - 7 %    BASOPHILS 0 0 - 1 %    ABS. NEUTROPHILS 4.4 1.8 - 8.0 K/UL    ABS. LYMPHOCYTES 2.4 0.8 - 3.5 K/UL    ABS. MONOCYTES 0.5 0.0 - 1.0 K/UL    ABS. EOSINOPHILS 0.1 0.0 - 0.4 K/UL    ABS. BASOPHILS 0.0 0.0 - 0.1 K/UL   METABOLIC PANEL, COMPREHENSIVE    Collection Time: 08/22/17  3:43 AM   Result Value Ref Range    Sodium 138 136 - 145 mmol/L    Potassium 3.6 3.5 - 5.1 mmol/L    Chloride 102 97 - 108 mmol/L    CO2 30 21 - 32 mmol/L    Anion gap 6 5 - 15 mmol/L    Glucose 92 65 - 100 mg/dL    BUN 19 6 - 20 MG/DL    Creatinine 0.65 0.55 - 1.02 MG/DL    BUN/Creatinine ratio 29 (H) 12 - 20      GFR est AA >60 >60 ml/min/1.73m2    GFR est non-AA >60 >60 ml/min/1.73m2    Calcium 8.7 8.5 - 10.1 MG/DL    Bilirubin, total 0.2 0.2 - 1.0 MG/DL    ALT (SGPT) 15 12 - 78 U/L    AST (SGOT) 12 (L) 15 - 37 U/L    Alk. phosphatase 57 45 - 117 U/L    Protein, total 7.2 6.4 - 8.2 g/dL    Albumin 3.8 3.5 - 5.0 g/dL    Globulin 3.4 2.0 - 4.0 g/dL    A-G Ratio 1.1 1.1 - 2.2     LIPASE    Collection Time: 08/22/17  3:43 AM   Result Value Ref Range    Lipase 179 73 - 393 U/L       IMAGING RESULTS:   INDICATION: Right upper quadrant pain, nausea and vomiting      HIDA scan is performed with IV injection of 5 mCi Tc 99m Choletec.     Uniform distribution of radiotracer is observed in the liver, with prompt  excretion into the bile ducts. The gallbladder is appropriately visualized.   Small bowel activity is demonstrated.     Subsequently gallbladder ejection fraction is calculated during 30 minutes of  additional imaging during IV infusion of  1.29 micrograms CCK. Calculated  gallbladder ejection fraction is 90%. (Normal > 30%).    IMPRESSION  IMPRESSION:  1. Normal Gallbladder Filling. No Biliary Obstruction. 2. Normal Gallbladder EF.            EXAM:  US ABD LTD     INDICATION: Right upper abdomen pain for 2 days.     COMPARISON:  CT 5/20/2017     TECHNIQUE:  Limited abdominal ultrasound.     FINDINGS:      Liver: Length: 17.4 cm, slightly enlarged. Echogenicity is within normal limits. No focal liver lesion.      Main portal vein flow: Toward the liver.     Fluid: No ascites.     Gallbladder: Partly distended without gallstones. No gallbladder wall thickening  or pericholecystic fluid. Positive sonographic Stephens sign.      Bile ducts: There is no intra or extrahepatic biliary ductal dilatation. The  common bile duct measures 6 mm.     Pancreas: The visualized portions are within normal limits.      Kidneys: Right length: 10.2 cm. No hydronephrosis.     IMPRESSION  IMPRESSION: 1. Partly distended gallbladder without gallstones or biliary duct  dilatation.  Positive sonographic Stephens sign of uncertain significance given the  appearance of the gallbladder.     2. Slight hepatomegaly.            MEDICATIONS GIVEN:  Medications   sodium chloride 0.9 % bolus infusion 1,000 mL (0 mL IntraVENous IV Completed 8/22/17 0655)   ketorolac (TORADOL) injection 30 mg (30 mg IntraVENous Given 8/22/17 0424)   diazePAM (VALIUM) injection 2 mg (2 mg IntraVENous Given 8/22/17 0422)   lidocaine (PF) (XYLOCAINE) 100 mg in 0.9% sodium chloride 50 mL IVPB (100 mg IntraVENous Given 8/22/17 0528)   technetium mebrofenin (CHOLETEC) injection 5 millicurie (5 millicuries IntraVENous Given 8/22/17 0910)   [facility]) injection 1.29 mcg (1.29 mcg IntraVENous Given 8/22/17 1005)   morphine injection 4 mg (4 mg IntraVENous Given 8/22/17 1110)   ondansetron (ZOFRAN) injection 4 mg (4 mg IntraVENous Given 8/22/17 1110)       IMPRESSION:  1. Gastroenteritis, acute        PLAN:  1. Current Discharge Medication List      START taking these medications    Details   ondansetron (ZOFRAN ODT) 4 mg disintegrating tablet Take 1 Tab by mouth every eight (8) hours as needed for Nausea. Qty: 10 Tab, Refills: 0      famotidine (PEPCID) 20 mg tablet Take 1 Tab by mouth two (2) times a day. Qty: 20 Tab, Refills: 0           2. Follow-up Information     Follow up With Details Comments Contact Info    Lyubov Beaver MD Call in 2 days  [id number]  [phone]          Return to ED if worse     Discharge Note:    The pt is ready for discharge. The pt's signs, symptoms, diagnosis, and discharge instructions have been discussed and pt has conveyed their understanding. The pt is to follow up as recommended or return to ER should their symptoms worsen. Plan has been discussed and pt is in agreement. This note is prepared by Pippa Santana, acting as a Scribe for [employer] Luis F Moran MD       The scribe's documentation has been prepared under my direction and personally reviewed by me in its entirety. I confirm that the notes above accurately reflects all work, treatment, procedures, and medical decision making performed by me.

## 2017-08-22 NOTE — DISCHARGE INSTRUCTIONS
Gastroenteritis: Care Instructions  Your Care Instructions  Gastroenteritis is an illness that may cause nausea, vomiting, and diarrhea. It is sometimes called \"stomach flu. \" It can be caused by bacteria or a virus. You will probably begin to feel better in 1 to 2 days. In the meantime, get plenty of rest and make sure you do not become dehydrated. Dehydration occurs when your body loses too much fluid. Follow-up care is a key part of your treatment and safety. Be sure to make and go to all appointments, and call your doctor if you are having problems. Its also a good idea to know your test results and keep a list of the medicines you take. How can you care for yourself at home? · If your doctor prescribed antibiotics, take them as directed. Do not stop taking them just because you feel better. You need to take the full course of antibiotics. · Drink plenty of fluids to prevent dehydration, enough so that your urine is light yellow or clear like water. Choose water and other caffeine-free clear liquids until you feel better. If you have kidney, heart, or liver disease and have to limit fluids, talk with your doctor before you increase your fluid intake. · Drink fluids slowly, in frequent, small amounts, because drinking too much too fast can cause vomiting. · Begin eating mild foods, such as dry toast, yogurt, applesauce, bananas, and rice. Avoid spicy, hot, or high-fat foods, and do not drink alcohol or caffeine for a day or two. Do not drink milk or eat ice cream until you are feeling better. How to prevent gastroenteritis  · Keep hot foods hot and cold foods cold. · Do not eat meats, dressings, salads, or other foods that have been kept at room temperature for more than 2 hours. · Use a thermometer to check your refrigerator. It should be between 34°F and 40°F.  · Defrost meats in the refrigerator or microwave, not on the kitchen counter. · Keep your hands and your kitchen clean.  Wash your hands, cutting boards, and countertops with hot soapy water frequently. · Cook meat until it is well done. · Do not eat raw eggs or uncooked sauces made with raw eggs. · Do not take chances. If food looks or tastes spoiled, throw it out. When should you call for help? Call 911 anytime you think you may need emergency care. For example, call if:  · You vomit blood or what looks like coffee grounds. · You passed out (lost consciousness). · You pass maroon or very bloody stools. Call your doctor now or seek immediate medical care if:  · You have severe belly pain. · You have signs of needing more fluids. You have sunken eyes, a dry mouth, and pass only a little dark urine. · You feel like you are going to faint. · You have increased belly pain that does not go away in 1 to 2 days. · You have new or increased nausea, or you are vomiting. · You have a new or higher fever. · Your stools are black and tarlike or have streaks of blood. Watch closely for changes in your health, and be sure to contact your doctor if:  · You are dizzy or lightheaded. · You urinate less than usual, or your urine is dark yellow or brown. · You do not feel better with each day that goes by. Where can you learn more? Go to http://jake-laury.info/. Enter N142 in the search box to learn more about \"Gastroenteritis: Care Instructions. \"  Current as of: March 3, 2017  Content Version: 11.3  © 6007-6560 [employer]. Care instructions adapted under license by [employer] (which disclaims liability or warranty for this information). If you have questions about a medical condition or this instruction, always ask your healthcare professional. [address] any warranty or liability for your use of this information.

## 2017-08-22 NOTE — ED NOTES
Assumed care of pt from triage. Pt presents to ED with chief complaint of RUQ abdominal pain x a few days. Pt reports having hx of \"gallbladder attacks\". Pt reports N/V/D, but denies fever. Pt is A&O x 4. Pt denies any other symptoms at this time. Pt resting comfortably on the stretcher in a position of comfort. Pt in no acute distress at this time. Call bell within reach. Side rails x 2. Cardiac monitor x 2. Stretcher locked in the lowest position. Pt aware of plan to await for MD/PA-C/NP assessment, and pt/family verbalizes understanding. Will continue to monitor.

## 2017-08-22 NOTE — Clinical Note
Thank you for allowing us to provide you with excellent care today. We hope we addressed all of your concerns and needs. We strive to provide excellent quality care in the Emergency Department. Please rate us as excellent, as anything less than exce llent does not meet our expectations. If you feel that you have not received excellent quality care or timely care, please ask to speak to the nurse manager. Please choose us in the future for your continued health care needs. The exam and treatm ent you received in the Emergency Department were for an urgent problem and are not intended as complete care. It is important that you follow-up with a doctor, nurse practitioner, or physician assistant to:  (1) confirm your diagnosis,  (2) re-evaluatio n of changes in your illness and treatment, and  (3) for ongoing care. If your symptoms become worse or you do not improve as expected and you are unable to reach your usual health care provider, you should return to the Emergency Department. We are juanito ilable 24 hours a day. Take this sheet with you when you go to your follow-up visit. If you have any problem arranging the follow-up visit, contact [address] [place] [address] 21 [phone]) Make an appointment with your Primary Care doctor for follow up of this visit. Re turn to the ER if you are unable to be seen in the time recommended on your discharge instructions.

## 2017-08-22 NOTE — ED NOTES
Bedside and verbal report given to Kei Zimmerman RN on Jorge Alberto Sheets at shift change for routine progression of care. Report consisted of patients Situation, Background, Assessment and Recommendations(SBAR). Information from the following report(s) SBAR, ED Summary, [facility] and Recent Results was reviewed with the receiving nurse. Opportunity for questions and clarification was provided.

## 2017-08-26 ENCOUNTER — HOSPITAL ENCOUNTER (OUTPATIENT)
Age: 37
Setting detail: OBSERVATION
Discharge: HOME | DRG: 392 | End: 2017-08-30
Attending: EMERGENCY MEDICINE | Admitting: INTERNAL MEDICINE
Payer: COMMERCIAL

## 2017-08-26 DIAGNOSIS — K59.00 CONSTIPATION, UNSPECIFIED CONSTIPATION TYPE: Primary | ICD-10-CM

## 2017-08-26 DIAGNOSIS — R11.2 NAUSEA AND VOMITING, INTRACTABILITY OF VOMITING NOT SPECIFIED, UNSPECIFIED VOMITING TYPE: ICD-10-CM

## 2017-08-26 DIAGNOSIS — R10.84 ABDOMINAL PAIN, GENERALIZED: ICD-10-CM

## 2017-08-26 LAB
ALBUMIN SERPL-MCNC: 4.1 G/DL (ref 3.5–5)
ALBUMIN/GLOB SERPL: 1 {RATIO} (ref 1.1–2.2)
ALP SERPL-CCNC: 72 U/L (ref 45–117)
ALT SERPL-CCNC: 24 U/L (ref 12–78)
ANION GAP SERPL CALC-SCNC: 8 MMOL/L (ref 5–15)
AST SERPL-CCNC: 19 U/L (ref 15–37)
BASOPHILS # BLD: 0 K/UL (ref 0–0.1)
BASOPHILS NFR BLD: 0 % (ref 0–1)
BILIRUB SERPL-MCNC: 0.4 MG/DL (ref 0.2–1)
BUN SERPL-MCNC: 22 MG/DL (ref 6–20)
BUN/CREAT SERPL: 24 (ref 12–20)
CALCIUM SERPL-MCNC: 9.6 MG/DL (ref 8.5–10.1)
CHLORIDE SERPL-SCNC: 99 MMOL/L (ref 97–108)
CO2 SERPL-SCNC: 28 MMOL/L (ref 21–32)
CREAT SERPL-MCNC: 0.91 MG/DL (ref 0.55–1.02)
EOSINOPHIL # BLD: 0 K/UL (ref 0–0.4)
EOSINOPHIL NFR BLD: 0 % (ref 0–7)
ERYTHROCYTE [DISTWIDTH] IN BLOOD BY AUTOMATED COUNT: 13 % (ref 11.5–14.5)
GLOBULIN SER CALC-MCNC: 4 G/DL (ref 2–4)
GLUCOSE SERPL-MCNC: 159 MG/DL (ref 65–100)
HCT VFR BLD AUTO: 42.3 % (ref 35–47)
HGB BLD-MCNC: 15.3 G/DL (ref 11.5–16)
LACTATE SERPL-SCNC: 1.9 MMOL/L (ref 0.4–2)
LIPASE SERPL-CCNC: 91 U/L (ref 73–393)
LYMPHOCYTES # BLD: 1.3 K/UL (ref 0.8–3.5)
LYMPHOCYTES NFR BLD: 6 % (ref 12–49)
MCH RBC QN AUTO: 33.7 PG (ref 26–34)
MCHC RBC AUTO-ENTMCNC: 36.2 G/DL (ref 30–36.5)
MCV RBC AUTO: 93.2 FL (ref 80–99)
MONOCYTES # BLD: 0.9 K/UL (ref 0–1)
MONOCYTES NFR BLD: 4 % (ref 5–13)
NEUTS SEG # BLD: 18.8 K/UL (ref 1.8–8)
NEUTS SEG NFR BLD: 90 % (ref 32–75)
PLATELET # BLD AUTO: 214 K/UL (ref 150–400)
POTASSIUM SERPL-SCNC: 4 MMOL/L (ref 3.5–5.1)
PROT SERPL-MCNC: 8.1 G/DL (ref 6.4–8.2)
RBC # BLD AUTO: 4.54 M/UL (ref 3.8–5.2)
SODIUM SERPL-SCNC: 135 MMOL/L (ref 136–145)
WBC # BLD AUTO: 20.9 K/UL (ref 3.6–11)

## 2017-08-26 PROCEDURE — 83605 ASSAY OF LACTIC ACID: CPT | Performed by: EMERGENCY MEDICINE

## 2017-08-26 PROCEDURE — 80053 COMPREHEN METABOLIC PANEL: CPT | Performed by: EMERGENCY MEDICINE

## 2017-08-26 PROCEDURE — 36415 COLL VENOUS BLD VENIPUNCTURE: CPT | Performed by: EMERGENCY MEDICINE

## 2017-08-26 PROCEDURE — 96376 TX/PRO/DX INJ SAME DRUG ADON: CPT

## 2017-08-26 PROCEDURE — 74011250636 HC RX REV CODE- 250/636: Performed by: EMERGENCY MEDICINE

## 2017-08-26 PROCEDURE — 99284 EMERGENCY DEPT VISIT MOD MDM: CPT

## 2017-08-26 PROCEDURE — 85025 COMPLETE CBC W/AUTO DIFF WBC: CPT | Performed by: EMERGENCY MEDICINE

## 2017-08-26 PROCEDURE — 96375 TX/PRO/DX INJ NEW DRUG ADDON: CPT

## 2017-08-26 PROCEDURE — 83690 ASSAY OF LIPASE: CPT | Performed by: EMERGENCY MEDICINE

## 2017-08-26 PROCEDURE — 96374 THER/PROPH/DIAG INJ IV PUSH: CPT

## 2017-08-26 PROCEDURE — 96361 HYDRATE IV INFUSION ADD-ON: CPT

## 2017-08-26 RX ORDER — HYDROMORPHONE HYDROCHLORIDE 2 MG/ML
1 INJECTION, SOLUTION INTRAMUSCULAR; INTRAVENOUS; SUBCUTANEOUS
Status: COMPLETED | OUTPATIENT
Start: 2017-08-26 | End: 2017-08-27

## 2017-08-26 RX ORDER — HYDROMORPHONE HYDROCHLORIDE 2 MG/ML
1 INJECTION, SOLUTION INTRAMUSCULAR; INTRAVENOUS; SUBCUTANEOUS
Status: COMPLETED | OUTPATIENT
Start: 2017-08-26 | End: 2017-08-26

## 2017-08-26 RX ORDER — ONDANSETRON 2 MG/ML
4 INJECTION INTRAMUSCULAR; INTRAVENOUS
Status: COMPLETED | OUTPATIENT
Start: 2017-08-26 | End: 2017-08-26

## 2017-08-26 RX ADMIN — SODIUM CHLORIDE 1000 ML: 900 INJECTION, SOLUTION INTRAVENOUS at 22:39

## 2017-08-26 RX ADMIN — HYDROMORPHONE HYDROCHLORIDE 1 MG: 2 INJECTION, SOLUTION INTRAMUSCULAR; INTRAVENOUS; SUBCUTANEOUS at 22:48

## 2017-08-26 RX ADMIN — ONDANSETRON 4 MG: 2 INJECTION INTRAMUSCULAR; INTRAVENOUS at 22:48

## 2017-08-26 NOTE — IP AVS SNAPSHOT
[address] [facility] 
[phone] Patient: Lilly Da Silva MRN: [id number] IEV:[id number] You are allergic to the following Allergen Reactions Other Food Itching Fresh fruit, cannot be specific Codeine Hives Allergic to tylenol with codeine combo Tylenol-Codeine #2 Hives Nuts (Tree Nut) Itching Recent Documentation Height Weight Breastfeeding? BMI OB Status Smoking Status 1.651 m 62.6 kg No 22.96 kg/m2 Hysterectomy Current Some Day Smoker Unresulted Labs Order Current Status CULTURE, BLOOD, PAIRED Preliminary result Emergency Contacts Name Discharge Info Relation Home Work Mobile Олег Burkett N/A  AT THIS TIME [6] Father [15] [phone] Deidra Dimas N/A  AT THIS TIME [6] Sister [23]   [phone] About your hospitalization You were admitted on:  August 27, 2017 You last received care in the:  [facility] GENERAL [facility]GERY You were discharged on:  August 30, 2017 Unit phone number:  [phone] Why you were hospitalized Your primary diagnosis was:  Not on File Your diagnoses also included:  Abdominal Pain, Constipation, Intractable Nausea And Vomiting Providers Seen During Your Hospitalizations Provider Role Specialty Primary office phone Nadira Gilbert MD Attending Provider Emergency Medicine [phone] Alex Schultz MD Attending Provider Internal Medicine [phone] Danisha Currie MD Attending Provider Internal Medicine [phone] Your Primary Care Physician (PCP) Primary Care Physician Office Phone Office Fax [address] [facility] [address] [phone] Follow-up Information Follow up With Details Comments Contact Info Ayala Redman MD On 9/1/2017 9:30 ; This is your scheduled procedure for outpatient EGD  [address] [facility] [address] [facility] 
[phone] Madeleine Baez MD Go on 9/7/2017 PCP follow up at 4:00  [address] [facility] [address] [phone] Your Appointments Friday September 01, 2017 ESOPHAGOGASTRODUODENOSCOPY (EGD) with Wendi Davidson MD  
[facility] END[facility] (RI OR PRE ASSESSMENT) [address] [facility]  
[phone] Thursday September 07, 2017  4:00 PM EDT Office Visit with Madeleine Baez MD  
[facility]) [address] [facility] [address] [phone] Current Discharge Medication List  
  
START taking these medications Dose & Instructions Dispensing Information Comments Morning Noon Evening Bedtime  
 dicyclomine 10 mg capsule Commonly known as:  BENTYL Replaces:  dicyclomine 20 mg tablet Your last dose was: Your next dose is:    
   
   
 Dose:  10 mg Take 1 Cap by mouth four (4) times daily. Quantity:  20 Cap Refills:  0  
     
   
   
   
  
 metroNIDAZOLE 500 mg tablet Commonly known as:  FLAGYL Your last dose was: Your next dose is:    
   
   
 Dose:  500 mg Take 1 Tab by mouth three (3) times daily for 3 days. Quantity:  9 Tab Refills:  0 CONTINUE these medications which have NOT CHANGED Dose & Instructions Dispensing Information Comments Morning Noon Evening Bedtime  
 clonazePAM 0.5 mg tablet Commonly known as:  [place] [employer]rape Your last dose was: Your next dose is:    
   
   
 Dose:  0.5 mg Take 0.5 mg by mouth two (2) times a day. Refills:  0  
     
   
   
   
  
 famotidine 20 mg tablet Commonly known as:  PEPCID Your last dose was: Your next dose is:    
   
   
 Dose:  20 mg Take 1 Tab by mouth two (2) times a day. Quantity:  20 Tab Refills:  0 LEXAPRO 20 mg tablet Generic drug:  escitalopram oxalate Your last dose was: Your next dose is:    
   
   
 Dose:  20 mg Take 20 mg by mouth daily. Refills:  0  
     
   
   
   
  
 ondansetron 4 mg disintegrating tablet Commonly known as:  ZOFRAN ODT Your last dose was: Your next dose is:    
   
   
 Dose:  4 mg Take 1 Tab by mouth every eight (8) hours as needed for Nausea. Quantity:  10 Tab Refills:  0  
     
   
   
   
  
 VYVANSE 50 mg Cap Generic drug:  Lisdexamfetamine Your last dose was: Your next dose is: Take by mouth daily. Refills:  0 STOP taking these medications ALPRAZolam 1 mg tablet Commonly known as:  XANAX  
   
  
 dicyclomine 20 mg tablet Commonly known as:  BENTYL Replaced by:  dicyclomine 10 mg capsule  
   
  
 oxyCODONE IR 5 mg immediate release tablet Commonly known as:  Felix Fuelling Where to Get Your Medications Information on where to get these meds will be given to you by the nurse or doctor. ! Ask your nurse or doctor about these medications  
  dicyclomine 10 mg capsule  
 metroNIDAZOLE 500 mg tablet Discharge Instructions None Discharge Orders None [employer] Announcement We are excited to announce that we are making your provider's discharge notes available to you in C[employer]notion. You will see these notes when they are completed and signed by the physician that discharged you from your recent hospital stay. If you have any questions or concerns about any information you see in C[employer]notion, please call the Health Information Department where you were seen or reach out to your Primary Care Provider for more information about your plan of care. Introducing [facility] SERVICES! German Hartman introduces Cognotion patient portal. Now you can access parts of your medical record, email your doctor's office, and request medication refills online. 1. In your internet browser, go to [email or web address] 2. Click on the First Time User? Click Here link in the Sign In box. You will see the New Member Sign Up page. 3. Enter your [employer] Access Code exactly as it appears below. You will not need to use this code after youve completed the sign-up process. If you do not sign up before the expiration date, you must request a new code. · [employer] Access Code: [email or web address] Expires: 11/20/2017 12:26 PM 
 
4. Enter the last four digits of your Social Security Number (xxxx) and Date of Birth (mm/dd/yyyy) as indicated and click Submit. You will be taken to the next sign-up page. 5. Create a [employer] ID. This will be your [employer] login ID and cannot be changed, so think of one that is secure and easy to remember. 6. Create a [employer] password. You can change your password at any time. 7. Enter your Password Reset Question and Answer. This can be used at a later time if you forget your password. 8. Enter your e-mail address. You will receive e-mail notification when new information is available in [address] 9. Click Sign Up. You can now view and download portions of your medical record. 10. Click the Download Summary menu link to download a portable copy of your medical information. If you have questions, please visit the Frequently Asked Questions section of the [employer] website. Remember, [employer] is NOT to be used for urgent needs. For medical emergencies, dial 911. Now available from your iPhone and Android! General Information Please provide this summary of care documentation to your next provider. Patient Signature:  ____________________________________________________________ Date:  ____________________________________________________________  
  
Marvetta Land Provider Signature:  ____________________________________________________________ Date:  ____________________________________________________________

## 2017-08-27 ENCOUNTER — APPOINTMENT (OUTPATIENT)
Dept: CT IMAGING | Age: 37
DRG: 392 | End: 2017-08-27
Attending: EMERGENCY MEDICINE
Payer: COMMERCIAL

## 2017-08-27 PROBLEM — R10.9 ABDOMINAL PAIN: Status: ACTIVE | Noted: 2017-08-27

## 2017-08-27 PROBLEM — R11.2 INTRACTABLE NAUSEA AND VOMITING: Status: ACTIVE | Noted: 2017-08-27

## 2017-08-27 PROBLEM — K59.00 CONSTIPATION: Status: ACTIVE | Noted: 2017-08-27

## 2017-08-27 LAB
APPEARANCE UR: CLEAR
BACTERIA URNS QL MICRO: NEGATIVE /HPF
BILIRUB UR QL CFM: NEGATIVE
COLOR UR: ABNORMAL
EPITH CASTS URNS QL MICRO: ABNORMAL /LPF
GLUCOSE UR STRIP.AUTO-MCNC: NEGATIVE MG/DL
HCG UR QL: NEGATIVE
HGB UR QL STRIP: NEGATIVE
HYALINE CASTS URNS QL MICRO: ABNORMAL /LPF (ref 0–5)
KETONES UR QL STRIP.AUTO: ABNORMAL MG/DL
LEUKOCYTE ESTERASE UR QL STRIP.AUTO: ABNORMAL
NITRITE UR QL STRIP.AUTO: NEGATIVE
PH UR STRIP: 5 [PH] (ref 5–8)
PROT UR STRIP-MCNC: NEGATIVE MG/DL
RBC #/AREA URNS HPF: ABNORMAL /HPF (ref 0–5)
SP GR UR REFRACTOMETRY: 1.02 (ref 1–1.03)
UA: UC IF INDICATED,UAUC: ABNORMAL
UROBILINOGEN UR QL STRIP.AUTO: 0.2 EU/DL (ref 0.2–1)
WBC URNS QL MICRO: ABNORMAL /HPF (ref 0–4)

## 2017-08-27 PROCEDURE — 87040 BLOOD CULTURE FOR BACTERIA: CPT | Performed by: INTERNAL MEDICINE

## 2017-08-27 PROCEDURE — 96366 THER/PROPH/DIAG IV INF ADDON: CPT

## 2017-08-27 PROCEDURE — 99218 HC RM OBSERVATION: CPT

## 2017-08-27 PROCEDURE — 77030032490 HC SLV COMPR SCD KNE COVD -B

## 2017-08-27 PROCEDURE — 74011000250 HC RX REV CODE- 250: Performed by: INTERNAL MEDICINE

## 2017-08-27 PROCEDURE — 96365 THER/PROPH/DIAG IV INF INIT: CPT

## 2017-08-27 PROCEDURE — 74011250636 HC RX REV CODE- 250/636

## 2017-08-27 PROCEDURE — 36415 COLL VENOUS BLD VENIPUNCTURE: CPT | Performed by: INTERNAL MEDICINE

## 2017-08-27 PROCEDURE — 96375 TX/PRO/DX INJ NEW DRUG ADDON: CPT

## 2017-08-27 PROCEDURE — 74011250636 HC RX REV CODE- 250/636: Performed by: INTERNAL MEDICINE

## 2017-08-27 PROCEDURE — 74011250636 HC RX REV CODE- 250/636: Performed by: EMERGENCY MEDICINE

## 2017-08-27 PROCEDURE — 65270000029 HC RM PRIVATE

## 2017-08-27 PROCEDURE — 74011000250 HC RX REV CODE- 250: Performed by: EMERGENCY MEDICINE

## 2017-08-27 PROCEDURE — 96376 TX/PRO/DX INJ SAME DRUG ADON: CPT

## 2017-08-27 PROCEDURE — 74011636320 HC RX REV CODE- 636/320: Performed by: EMERGENCY MEDICINE

## 2017-08-27 PROCEDURE — 74011250637 HC RX REV CODE- 250/637: Performed by: INTERNAL MEDICINE

## 2017-08-27 PROCEDURE — 81001 URINALYSIS AUTO W/SCOPE: CPT | Performed by: EMERGENCY MEDICINE

## 2017-08-27 PROCEDURE — 96361 HYDRATE IV INFUSION ADD-ON: CPT

## 2017-08-27 PROCEDURE — 74011250637 HC RX REV CODE- 250/637: Performed by: EMERGENCY MEDICINE

## 2017-08-27 PROCEDURE — 81025 URINE PREGNANCY TEST: CPT | Performed by: EMERGENCY MEDICINE

## 2017-08-27 PROCEDURE — 74177 CT ABD & PELVIS W/CONTRAST: CPT

## 2017-08-27 PROCEDURE — 96372 THER/PROPH/DIAG INJ SC/IM: CPT

## 2017-08-27 RX ORDER — DEXTROMETHORPHAN POLISTIREX 30 MG/5 ML
SUSPENSION, EXTENDED RELEASE 12 HR ORAL
Status: DISCONTINUED | OUTPATIENT
Start: 2017-08-27 | End: 2017-08-27

## 2017-08-27 RX ORDER — ESCITALOPRAM OXALATE 10 MG/1
20 TABLET ORAL DAILY
Status: DISCONTINUED | OUTPATIENT
Start: 2017-08-27 | End: 2017-08-30 | Stop reason: HOSPADM

## 2017-08-27 RX ORDER — SODIUM CHLORIDE 9 MG/ML
75 INJECTION, SOLUTION INTRAVENOUS CONTINUOUS
Status: DISCONTINUED | OUTPATIENT
Start: 2017-08-27 | End: 2017-08-30 | Stop reason: HOSPADM

## 2017-08-27 RX ORDER — CLONAZEPAM 0.5 MG/1
0.5 TABLET ORAL
Status: DISCONTINUED | OUTPATIENT
Start: 2017-08-27 | End: 2017-08-30 | Stop reason: HOSPADM

## 2017-08-27 RX ORDER — HYDROMORPHONE HYDROCHLORIDE 2 MG/ML
1 INJECTION, SOLUTION INTRAMUSCULAR; INTRAVENOUS; SUBCUTANEOUS
Status: COMPLETED | OUTPATIENT
Start: 2017-08-27 | End: 2017-08-27

## 2017-08-27 RX ORDER — METRONIDAZOLE 500 MG/100ML
500 INJECTION, SOLUTION INTRAVENOUS EVERY 8 HOURS
Status: DISCONTINUED | OUTPATIENT
Start: 2017-08-27 | End: 2017-08-30 | Stop reason: HOSPADM

## 2017-08-27 RX ORDER — KETOROLAC TROMETHAMINE 30 MG/ML
15 INJECTION, SOLUTION INTRAMUSCULAR; INTRAVENOUS
Status: COMPLETED | OUTPATIENT
Start: 2017-08-27 | End: 2017-08-27

## 2017-08-27 RX ORDER — DICYCLOMINE HYDROCHLORIDE 20 MG/1
20 TABLET ORAL
Status: COMPLETED | OUTPATIENT
Start: 2017-08-27 | End: 2017-08-27

## 2017-08-27 RX ORDER — SODIUM CHLORIDE 0.9 % (FLUSH) 0.9 %
5-10 SYRINGE (ML) INJECTION AS NEEDED
Status: DISCONTINUED | OUTPATIENT
Start: 2017-08-27 | End: 2017-08-30 | Stop reason: HOSPADM

## 2017-08-27 RX ORDER — SODIUM CHLORIDE 0.9 % (FLUSH) 0.9 %
10 SYRINGE (ML) INJECTION
Status: COMPLETED | OUTPATIENT
Start: 2017-08-27 | End: 2017-08-27

## 2017-08-27 RX ORDER — POLYETHYLENE GLYCOL 3350, SODIUM SULFATE ANHYDROUS, SODIUM BICARBONATE, SODIUM CHLORIDE, POTASSIUM CHLORIDE 236; 22.74; 6.74; 5.86; 2.97 G/4L; G/4L; G/4L; G/4L; G/4L
4000 POWDER, FOR SOLUTION ORAL
Status: COMPLETED | OUTPATIENT
Start: 2017-08-27 | End: 2017-08-27

## 2017-08-27 RX ORDER — ACETAMINOPHEN 325 MG/1
650 TABLET ORAL
Status: DISCONTINUED | OUTPATIENT
Start: 2017-08-27 | End: 2017-08-30 | Stop reason: HOSPADM

## 2017-08-27 RX ORDER — HYDROMORPHONE HYDROCHLORIDE 2 MG/ML
0.5 INJECTION, SOLUTION INTRAMUSCULAR; INTRAVENOUS; SUBCUTANEOUS
Status: DISCONTINUED | OUTPATIENT
Start: 2017-08-27 | End: 2017-08-28

## 2017-08-27 RX ORDER — HYDROMORPHONE HYDROCHLORIDE 1 MG/ML
0.5 INJECTION, SOLUTION INTRAMUSCULAR; INTRAVENOUS; SUBCUTANEOUS ONCE
Status: COMPLETED | OUTPATIENT
Start: 2017-08-27 | End: 2017-08-27

## 2017-08-27 RX ORDER — SODIUM CHLORIDE 9 MG/ML
50 INJECTION, SOLUTION INTRAVENOUS
Status: COMPLETED | OUTPATIENT
Start: 2017-08-27 | End: 2017-08-27

## 2017-08-27 RX ORDER — ONDANSETRON 2 MG/ML
4 INJECTION INTRAMUSCULAR; INTRAVENOUS
Status: COMPLETED | OUTPATIENT
Start: 2017-08-27 | End: 2017-08-27

## 2017-08-27 RX ORDER — HYDROMORPHONE HYDROCHLORIDE 2 MG/ML
INJECTION, SOLUTION INTRAMUSCULAR; INTRAVENOUS; SUBCUTANEOUS
Status: COMPLETED
Start: 2017-08-27 | End: 2017-08-27

## 2017-08-27 RX ORDER — ONDANSETRON 2 MG/ML
4 INJECTION INTRAMUSCULAR; INTRAVENOUS
Status: DISCONTINUED | OUTPATIENT
Start: 2017-08-27 | End: 2017-08-30 | Stop reason: HOSPADM

## 2017-08-27 RX ORDER — SODIUM CHLORIDE 0.9 % (FLUSH) 0.9 %
5-10 SYRINGE (ML) INJECTION EVERY 8 HOURS
Status: DISCONTINUED | OUTPATIENT
Start: 2017-08-27 | End: 2017-08-30 | Stop reason: HOSPADM

## 2017-08-27 RX ADMIN — ONDANSETRON 4 MG: 2 INJECTION INTRAMUSCULAR; INTRAVENOUS at 13:41

## 2017-08-27 RX ADMIN — SODIUM CHLORIDE 50 ML/HR: 900 INJECTION, SOLUTION INTRAVENOUS at 00:44

## 2017-08-27 RX ADMIN — SODIUM CHLORIDE 10 MG: 9 INJECTION INTRAMUSCULAR; INTRAVENOUS; SUBCUTANEOUS at 03:33

## 2017-08-27 RX ADMIN — HYDROMORPHONE HYDROCHLORIDE 0.5 MG: 2 INJECTION, SOLUTION INTRAMUSCULAR; INTRAVENOUS; SUBCUTANEOUS at 15:48

## 2017-08-27 RX ADMIN — ONDANSETRON 4 MG: 2 INJECTION INTRAMUSCULAR; INTRAVENOUS at 02:15

## 2017-08-27 RX ADMIN — HYDROMORPHONE HYDROCHLORIDE 0.5 MG: 2 INJECTION, SOLUTION INTRAMUSCULAR; INTRAVENOUS; SUBCUTANEOUS at 11:38

## 2017-08-27 RX ADMIN — Medication 10 ML: at 11:41

## 2017-08-27 RX ADMIN — POLYETHYLENE GLYCOL 3350, SODIUM SULFATE ANHYDROUS, SODIUM BICARBONATE, SODIUM CHLORIDE, POTASSIUM CHLORIDE 4000 ML: 236; 22.74; 6.74; 5.86; 2.97 POWDER, FOR SOLUTION ORAL at 13:48

## 2017-08-27 RX ADMIN — SODIUM PHOSPHATE, DIBASIC AND SODIUM PHOSPHATE, MONOBASIC 118 ML: 7; 19 ENEMA RECTAL at 15:22

## 2017-08-27 RX ADMIN — HYDROMORPHONE HYDROCHLORIDE 0.5 MG: 1 INJECTION, SOLUTION INTRAMUSCULAR; INTRAVENOUS; SUBCUTANEOUS at 22:23

## 2017-08-27 RX ADMIN — Medication 10 ML: at 13:42

## 2017-08-27 RX ADMIN — SODIUM CHLORIDE 75 ML/HR: 900 INJECTION, SOLUTION INTRAVENOUS at 09:02

## 2017-08-27 RX ADMIN — Medication 10 ML: at 09:02

## 2017-08-27 RX ADMIN — HYDROMORPHONE HYDROCHLORIDE 0.5 MG: 2 INJECTION, SOLUTION INTRAMUSCULAR; INTRAVENOUS; SUBCUTANEOUS at 07:41

## 2017-08-27 RX ADMIN — METRONIDAZOLE 500 MG: 500 INJECTION, SOLUTION INTRAVENOUS at 15:48

## 2017-08-27 RX ADMIN — ACETAMINOPHEN 650 MG: 325 TABLET ORAL at 18:08

## 2017-08-27 RX ADMIN — HYDROMORPHONE HYDROCHLORIDE 1 MG: 2 INJECTION, SOLUTION INTRAMUSCULAR; INTRAVENOUS; SUBCUTANEOUS at 05:22

## 2017-08-27 RX ADMIN — ESCITALOPRAM OXALATE 20 MG: 10 TABLET ORAL at 09:16

## 2017-08-27 RX ADMIN — IOPAMIDOL 100 ML: 755 INJECTION, SOLUTION INTRAVENOUS at 00:44

## 2017-08-27 RX ADMIN — METHYLNALTREXONE BROMIDE 12 MG: 12 INJECTION, SOLUTION SUBCUTANEOUS at 11:48

## 2017-08-27 RX ADMIN — ACETAMINOPHEN 650 MG: 325 TABLET ORAL at 09:38

## 2017-08-27 RX ADMIN — HYDROMORPHONE HYDROCHLORIDE 0.5 MG: 2 INJECTION, SOLUTION INTRAMUSCULAR; INTRAVENOUS; SUBCUTANEOUS at 20:00

## 2017-08-27 RX ADMIN — KETOROLAC TROMETHAMINE 15 MG: 30 INJECTION, SOLUTION INTRAMUSCULAR at 03:33

## 2017-08-27 RX ADMIN — Medication 10 ML: at 00:45

## 2017-08-27 RX ADMIN — HYDROMORPHONE HYDROCHLORIDE 1 MG: 2 INJECTION, SOLUTION INTRAMUSCULAR; INTRAVENOUS; SUBCUTANEOUS at 02:15

## 2017-08-27 RX ADMIN — DICYCLOMINE HYDROCHLORIDE 20 MG: 20 TABLET ORAL at 03:33

## 2017-08-27 RX ADMIN — HYDROMORPHONE HYDROCHLORIDE 1 MG: 2 INJECTION, SOLUTION INTRAMUSCULAR; INTRAVENOUS; SUBCUTANEOUS at 00:13

## 2017-08-27 RX ADMIN — METRONIDAZOLE 500 MG: 500 INJECTION, SOLUTION INTRAVENOUS at 09:12

## 2017-08-27 NOTE — ED NOTES
Pt resting quietly and in no acute distress at this time, but complaining of increased pain. Will notify MD. AGUILERA. Call bell within reach.

## 2017-08-27 NOTE — ED PROVIDER NOTES
HPI Comments: Matthew Monday is a 40 y.o. female, pmhx significant for anxiety, chronic lower back pain, who presents via EMS to the ED c/o gradually worsening RLQ and lower abd pain, nausea and vomiting with associated chills and constipation x today. Her lower abd pain is worse than her RLQ pain and she reports that it is constant with waves of more intense pain intermittently. Pt was seen here on 8/22/17 for similar complaints and was dx'd with gastroenteritis after an equivocal US abd and negative HIDA scan. She was d/c home with roxicodone and zofran. She states that she has been experiencing interchanging diarrhea and constipation for the past few weeks, but has been experiencing more constipation for the past 1.5 weeks, which is when she states her last normal BM was. She tried an OTC laxative (bisacodel) with no relief. Her abd pain worsened from what she was experiencing after her d/c on 8/22 this morning and she began vomiting again, so she decided to come back into the ED [facility] ED for further evaluation and treatment. She specifically denies green bile in her vomit, taking and anti-diarrheal or fever. PCP: Mirian Delgado MD      Social Hx: +tobacco (0.25 ppd), +EtOH (socially), -Illicit Drugs   FHx: no pertinent family hx   Medication Allergies: codeine      There are no other complaints, changes, or physical findings at this time. The history is provided by the patient and the EMS personnel.         Past Medical History:   Diagnosis Date    ADHD (attention deficit hyperactivity disorder)     Anxiety     Anxiety     Asthma     CHILDHOOD    Chronic back pain     Chronic pain     DISC ISSUES IN BACK    Psychiatric disorder     ANXIETY       Past Surgical History:   Procedure Laterality Date    BREAST SURGERY PROCEDURE UNLISTED  2002    reduction    HX BREAST REDUCTION      HX CYST REMOVAL Right     ganglion cyst removed from right wrist    HX GYN      BTL    HX GYN      D&C X 2    HX GYN 2012    Hysterectomy    HX OTHER SURGICAL  5/2013    cyst removed from right hand    HX TONSIL AND ADENOIDECTOMY      HX TONSILLECTOMY      HX WISDOM TEETH EXTRACTION           Family History:   Problem Relation Age of Onset    Anxiety Mother     Hypertension Mother     COPD Mother     Arthritis-osteo Mother     Psychiatric Disorder Mother     Hypertension Father     Cancer Brother     Schizophrenia Maternal Aunt     Heart Disease Maternal Grandfather     Stroke Maternal Grandfather     Cancer Paternal Grandmother     Anesth Problems Neg Hx        Social History     Social History    Marital status: LEGALLY      Spouse name: N/A    Number of children: N/A    Years of education: N/A     Occupational History    Not on file. Social History Main Topics    Smoking status: Current Some Day Smoker     Packs/day: 0.25     Years: 19.00    Smokeless tobacco: Current User      Comment: smokes vapor cig     Alcohol use 0.0 oz/week     0 Standard drinks or equivalent per week      Comment: Socially.  Drug use: No    Sexual activity: Yes     Partners: Male     Birth control/ protection: Surgical     Other Topics Concern    Not on file     Social History Narrative         ALLERGIES: Other food; Codeine; Tylenol-codeine #2; and Nuts [tree nut]    Review of Systems   Constitutional: Negative for chills, diaphoresis, fever and unexpected weight change. HENT: Negative for rhinorrhea and sore throat. Eyes: Negative for pain. Respiratory: Negative for shortness of breath, wheezing and stridor. Cardiovascular: Negative for chest pain and leg swelling. Gastrointestinal: Positive for abdominal pain (diffuse), constipation, nausea and vomiting. Negative for blood in stool and diarrhea. Genitourinary: Negative for difficulty urinating, dysuria and flank pain. Musculoskeletal: Negative for back pain and neck stiffness. Skin: Negative for rash.    Neurological: Negative for seizures, syncope, weakness, light-headedness and headaches. Psychiatric/Behavioral: Negative for confusion. All other systems reviewed and are negative. Patient Vitals for the past 12 hrs:   Temp Pulse Resp BP SpO2   08/27/17 0430 - - - - 93 %   08/27/17 0356 - - - - 97 %   08/27/17 0345 - - - - 96 %   08/27/17 0300 - - - (!) 127/91 92 %   08/27/17 0230 - - - 120/90 92 %   08/27/17 0030 - - - 122/77 93 %   08/26/17 2330 - - - 123/75 98 %   08/26/17 2219 97.6 °F (36.4 °C) 63 22 110/84 99 %     Physical Exam   Constitutional: She is oriented to person, place, and time. She appears well-developed and well-nourished. She appears distressed (moderate). HENT:   Nose: Nose normal.   Mouth/Throat: Oropharynx is clear and moist. No oropharyngeal exudate. Eyes: Conjunctivae and EOM are normal. Pupils are equal, round, and reactive to light. Right eye exhibits no discharge. Left eye exhibits no discharge. No scleral icterus. Neck: Normal range of motion. Neck supple. No JVD present. Cardiovascular: Normal rate, regular rhythm, normal heart sounds and intact distal pulses. No murmur heard. Pulmonary/Chest: Effort normal and breath sounds normal. No stridor. No respiratory distress. She has no wheezes. She has no rales. Abdominal: Soft. Bowel sounds are normal. She exhibits no distension. There is tenderness (diffuse). There is no rebound and no guarding. Musculoskeletal: She exhibits no edema or tenderness. Neurological: She is alert and oriented to person, place, and time. Skin: Skin is warm and dry. No rash noted. She is not diaphoretic. Psychiatric: She has a normal mood and affect. Nursing note and vitals reviewed.        MDM  Number of Diagnoses or Management Options  Abdominal pain, generalized:   Constipation, unspecified constipation type:   Nausea and vomiting, intractability of vomiting not specified, unspecified vomiting type:      Amount and/or Complexity of Data Reviewed  Clinical lab tests: reviewed and ordered  Tests in the radiology section of CPT®: ordered and reviewed  Obtain history from someone other than the patient: yes (EMS)  Review and summarize past medical records: yes  Discuss the patient with other providers: yes (Hospitalist )  Independent visualization of images, tracings, or specimens: yes    Patient Progress  Patient progress: stable    ED Course       Procedures  10:35 PM  Pt was seen here on 8/22/17 for RUQ pain, nausea and vomiting. She had a US that showed partial distention of her gallbladder with no stones present or biliary duct dilation. She had a positive sonographic villalta's sign. Upon follow up HIDA scan, she had normal gallbladder filling with no obstruction and normal gallbladder EF. Written by Jesse Dominguez ED Scribe as dictated by Darrius Nunn MD      CONSULT NOTE:   5:16 Killian Almanza MD spoke with Dr. Monet Crawley,   Specialty: Hospitalist  Discussed pt's hx, disposition, and available diagnostic and imaging results. Reviewed care plans. Consultant will evaluate pt for admission. Written by Jesse Dominguez, ED Scribe, as dictated by Darrius Nunn MD.    ADMIT NOTE:  LABORATORY TESTS:  Recent Results (from the past 12 hour(s))   CBC WITH AUTOMATED DIFF    Collection Time: 08/26/17 10:40 PM   Result Value Ref Range    WBC 20.9 (H) 3.6 - 11.0 K/uL    RBC 4.54 3.80 - 5.20 M/uL    HGB 15.3 11.5 - 16.0 g/dL    HCT 42.3 35.0 - 47.0 %    MCV 93.2 80.0 - 99.0 FL    MCH 33.7 26.0 - 34.0 PG    MCHC 36.2 30.0 - 36.5 g/dL    RDW 13.0 11.5 - 14.5 %    PLATELET 150 691 - 940 K/uL    NEUTROPHILS 90 (H) 32 - 75 %    LYMPHOCYTES 6 (L) 12 - 49 %    MONOCYTES 4 (L) 5 - 13 %    EOSINOPHILS 0 0 - 7 %    BASOPHILS 0 0 - 1 %    ABS. NEUTROPHILS 18.8 (H) 1.8 - 8.0 K/UL    ABS. LYMPHOCYTES 1.3 0.8 - 3.5 K/UL    ABS. MONOCYTES 0.9 0.0 - 1.0 K/UL    ABS. EOSINOPHILS 0.0 0.0 - 0.4 K/UL    ABS.  BASOPHILS 0.0 0.0 - 0.1 K/UL   METABOLIC PANEL, COMPREHENSIVE    Collection Time: 08/26/17 10:40 PM   Result Value Ref Range    Sodium 135 (L) 136 - 145 mmol/L    Potassium 4.0 3.5 - 5.1 mmol/L    Chloride 99 97 - 108 mmol/L    CO2 28 21 - 32 mmol/L    Anion gap 8 5 - 15 mmol/L    Glucose 159 (H) 65 - 100 mg/dL    BUN 22 (H) 6 - 20 MG/DL    Creatinine 0.91 0.55 - 1.02 MG/DL    BUN/Creatinine ratio 24 (H) 12 - 20      GFR est AA >60 >60 ml/min/1.73m2    GFR est non-AA >60 >60 ml/min/1.73m2    Calcium 9.6 8.5 - 10.1 MG/DL    Bilirubin, total 0.4 0.2 - 1.0 MG/DL    ALT (SGPT) 24 12 - 78 U/L    AST (SGOT) 19 15 - 37 U/L    Alk. phosphatase 72 45 - 117 U/L    Protein, total 8.1 6.4 - 8.2 g/dL    Albumin 4.1 3.5 - 5.0 g/dL    Globulin 4.0 2.0 - 4.0 g/dL    A-G Ratio 1.0 (L) 1.1 - 2.2     LIPASE    Collection Time: 08/26/17 10:40 PM   Result Value Ref Range    Lipase 91 73 - 393 U/L   LACTIC ACID    Collection Time: 08/26/17 10:40 PM   Result Value Ref Range    Lactic acid 1.9 0.4 - 2.0 MMOL/L   URINALYSIS W/ REFLEX CULTURE    Collection Time: 08/27/17  1:15 AM   Result Value Ref Range    Color DARK YELLOW      Appearance CLEAR CLEAR      Specific gravity 1.020 1.003 - 1.030      pH (UA) 5.0 5.0 - 8.0      Protein NEGATIVE  NEG mg/dL    Glucose NEGATIVE  NEG mg/dL    Ketone TRACE (A) NEG mg/dL    Blood NEGATIVE  NEG      Urobilinogen 0.2 0.2 - 1.0 EU/dL    Nitrites NEGATIVE  NEG      Leukocyte Esterase TRACE (A) NEG      UA:UC IF INDICATED CULTURE NOT INDICATED BY UA RESULT CNI      WBC 0-4 0 - 4 /hpf    RBC 0-5 0 - 5 /hpf    Epithelial cells FEW FEW /lpf    Bacteria NEGATIVE  NEG /hpf    Hyaline cast 0-2 0 - 5 /lpf   HCG URINE, QL    Collection Time: 08/27/17  1:15 AM   Result Value Ref Range    HCG urine, Ql. NEGATIVE  NEG     BILIRUBIN, CONFIRM    Collection Time: 08/27/17  1:15 AM   Result Value Ref Range    Bilirubin UA, confirm NEGATIVE  NEG         IMAGING RESULTS:  CT Results  (Last 48 hours)               08/27/17 0045  CT ABD PELV W CONT Final result    Impression:  IMPRESSION:    1.  Abnormal, but nonspecific appearance of the ascending and transverse colon   which is fluid-filled, becoming progressively more normal distally. This   finding is nonspecific. 2. Incidental findings as above. Narrative:  EXAM:  CT ABDOMEN PELVIS WITH CONTRAST   INDICATION:  diffuse abd pain, elevated wbc   COMPARISON: CT of the abdomen and pelvis, 5/20/2017 and 6/28/2015. Maciej Fernandez TECHNIQUE:    Multislice helical CT was performed from the diaphragm to the symphysis pubis   with oral and intravenous contrast administration. Contiguous 5 mm axial images   were reconstructed and lung and soft tissue windows were generated. Coronal and   sagittal reformations were generated. CT dose reduction was achieved through use of a standardized protocol tailored   for this examination and automatic exposure control for dose modulation. Maciej Fernandez FINDINGS:   INCIDENTALLY IMAGED CHEST:   Heart/vessels: Within normal limits. Lungs/Pleura: Within normal limits. .   ABDOMEN:   Liver: Within normal limits. Gallbladder/Biliary: Within normal limits. Spleen: Within normal limits. Pancreas: Within normal limits. Adrenals: Within normal limits. Kidneys: Within normal limits. Peritoneum/Mesenteries: Within normal limits. Extraperitoneum: Within normal limits. Gastrointestinal tract: Fluid-filled ascending and transverse colon which   assumes a more normal caliber and fecalized appearance more distally. The distal   small bowel is fluid-filled. The proximal small bowel is decompressed. Normal-appearing appendix. Minimal diverticulosis without evidence of   diverticulitis. Vascular: Within normal limits. Maciej Fernandez PELVIS:   Extraperitoneum: Within normal limits. Ureters: Within normal limits. Bladder: Within normal limits. Reproductive System: Status post hysterectomy. .   MSK:    Within normal limits. Maciej Fernandez              MEDICATIONS GIVEN:  Medications   PEG 3350-Electrolytes (GO-LYTELY) SUSPENSION 4,000 mL (not administered)   sodium chloride 0.9 % bolus infusion 1,000 mL (1,000 mL IntraVENous New Bag 8/26/17 2239)   ondansetron (ZOFRAN) injection 4 mg (4 mg IntraVENous Given 8/26/17 2248)   HYDROmorphone (PF) (DILAUDID) injection 1 mg (1 mg IntraVENous Given 8/26/17 2248)   HYDROmorphone (PF) (DILAUDID) injection 1 mg (1 mg IntraVENous Given 8/27/17 0013)   0.9% sodium chloride infusion (0 mL/hr IntraVENous IV Completed 8/27/17 0102)   iopamidol (ISOVUE-370) 76 % injection 100 mL (100 mL IntraVENous Given 8/27/17 0044)   sodium chloride (NS) flush 10 mL (10 mL IntraVENous Given 8/27/17 0045)   HYDROmorphone (PF) (DILAUDID) injection 1 mg (1 mg IntraVENous Given 8/27/17 0215)   ondansetron (ZOFRAN) injection 4 mg (4 mg IntraVENous Given 8/27/17 0215)   dicyclomine (BENTYL) tablet 20 mg (20 mg Oral Given 8/27/17 0333)   ketorolac (TORADOL) injection 15 mg (15 mg IntraVENous Given 8/27/17 0333)   prochlorperazine (COMPAZINE) with saline injection 10 mg (10 mg IntraVENous Given 8/27/17 0333)   HYDROmorphone (PF) (DILAUDID) injection 1 mg (1 mg IntraVENous Given 8/27/17 0522)       IMPRESSION:  1. Constipation, unspecified constipation type    2. Abdominal pain, generalized    3. Nausea and vomiting, intractability of vomiting not specified, unspecified vomiting type        PLAN: Admit to Hospitalist    5:29 AM  Patient is being admitted to the hospital by Dr. Brittany Woo. The results of their tests and reasons for their admission have been discussed with them and/or available family. They convey agreement and understanding for the need to be admitted and for their admission diagnosis. Written by Dakota Emmanuel ED Scribe, as dictated by Suad Andre MD.    This note is prepared by Shandra Dover acting as scribe for MD Suad Shafer MD : The scribe's documentation has been prepared under my direction and personally reviewed by me in its entirety.  I confirm that the note above accurately reflects all work, treatment, procedures, and medical decision making performed by me.

## 2017-08-27 NOTE — ED NOTES
Patient on monitor, desaturated to 87% on room air. Entered room to find patient asleep in bed, no apparent distress at this time. Awoke patient and encouraged to take several breaths, with which oxygen saturation increased to >92%.

## 2017-08-27 NOTE — PROGRESS NOTES
PT brought her medications from home. Security contacted and stated they are not allowed to store patient medications and advised nursing staff to store the medications on the unit. The medications are being stored in Patient  directly outside the PT's room.

## 2017-08-27 NOTE — ED NOTES
Assumed care of pt from [place]., RN. Pt resting quietly and in no acute distress at this time. Call bell within reach. VSS.

## 2017-08-27 NOTE — CONSULTS
GI Consultation Note Dayo Johnstonier)    NAME: Bishnu Mora : 1980 MRN: [id number]   PCP: Jerardo Encarnacion MD  Date/Time:  2017 11:12 AM  Subjective:   REASON FOR CONSULT:    AP/abnormal CT    Melinda Bowling is a 40 y.o.  female who I was asked to see for above. Pt's PMH is pertinent for C diff after abx treatment for a perirectal abscess back in . She has been having issues with AP/n/v as is scheduled for a outpatient EGD. Recently seen in ED for RUQ AP and had a RUQ U/S that showed a unremarkable gallbladder (but did have a Sonographic Stephens's). Subsequent HIDA scan was unremarkable. Pt reports she has been constipated for about 1 week (usually has a BM daily to every other day). She has been taking Oxycodone given by ED for AP. +N/v. Past Medical History:   Diagnosis Date    ADHD (attention deficit hyperactivity disorder)     Anxiety     Anxiety     Asthma     CHILDHOOD    Chronic back pain     Chronic pain     DISC ISSUES IN BACK    Psychiatric disorder     ANXIETY      Past Surgical History:   Procedure Laterality Date    BREAST SURGERY PROCEDURE UNLISTED      reduction    HX BREAST REDUCTION      HX CYST REMOVAL Right     ganglion cyst removed from right wrist    HX GYN      BTL    HX GYN      D&C X 2    HX GYN  2012    Hysterectomy    HX OTHER SURGICAL  2013    cyst removed from right hand    HX TONSIL AND ADENOIDECTOMY      HX TONSILLECTOMY      HX WISDOM TEETH EXTRACTION       Social History   Substance Use Topics    Smoking status: Current Some Day Smoker     Packs/day: 0.25     Years: 19.00    Smokeless tobacco: Current User      Comment: smokes vapor cig     Alcohol use 0.0 oz/week     0 Standard drinks or equivalent per week      Comment: Socially.        Family History   Problem Relation Age of Onset    Anxiety Mother     Hypertension Mother     COPD Mother    [facility] Arthritis-osteo Mother     Psychiatric Disorder Mother     Hypertension Father     Cancer Brother     Schizophrenia Maternal Aunt     Heart Disease Maternal Grandfather     Stroke Maternal Grandfather     Cancer Paternal Grandmother     Anesth Problems Neg Hx       Allergies   Allergen Reactions    Other Food Itching     Fresh fruit, cannot be specific    Codeine Hives     Allergic to tylenol with codeine combo    Tylenol-Codeine #2 Hives    Nuts [Tree Nut] Itching      Home Medications:  Prior to Admission Medications   Prescriptions Last Dose Informant Patient Reported? Taking? ALPRAZolam (XANAX) 1 mg tablet   Yes Yes   Sig: Take 1-2 mg by mouth three (3) times daily as needed. Lisdexamfetamine (VYVANSE) 50 mg cap   Yes Yes   Sig: Take by mouth daily. clonazePAM (KLONOPIN) 0.5 mg tablet   Yes Yes   Sig: Take 0.5 mg by mouth two (2) times a day. dicyclomine (BENTYL) 20 mg tablet 8/26/2017 at Unknown time  No Yes   Sig: Take 1 Tab by mouth every six (6) hours for 20 doses. escitalopram oxalate (LEXAPRO) 20 mg tablet   Yes Yes   Sig: Take 20 mg by mouth daily. famotidine (PEPCID) 20 mg tablet   No No   Sig: Take 1 Tab by mouth two (2) times a day. ondansetron (ZOFRAN ODT) 4 mg disintegrating tablet 8/26/2017 at Unknown time  No Yes   Sig: Take 1 Tab by mouth every eight (8) hours as needed for Nausea. oxyCODONE IR (ROXICODONE) 5 mg immediate release tablet   No No   Sig: Take 1 Tab by mouth every four (4) hours as needed for Pain for up to 6 doses. Max Daily Amount: 30 mg.       Facility-Administered Medications: None     Hospital medications:  Current Facility-Administered Medications   Medication Dose Route Frequency    PEG 3350-Electrolytes (GO-LYTELY) SUSPENSION 4,000 mL  4,000 mL Oral NOW    clonazePAM (KlonoPIN) tablet 0.5 mg  0.5 mg Oral BID PRN    escitalopram oxalate (LEXAPRO) tablet 20 mg  20 mg Oral DAILY    sodium chloride (NS) flush 5-10 mL  5-10 mL IntraVENous Q8H    sodium chloride (NS) flush 5-10 mL  5-10 mL IntraVENous PRN    0.9% sodium chloride infusion  75 mL/hr IntraVENous CONTINUOUS    acetaminophen (TYLENOL) tablet 650 mg  650 mg Oral Q6H PRN    HYDROmorphone (PF) (DILAUDID) injection 0.5 mg  0.5 mg IntraVENous Q4H PRN    ondansetron (ZOFRAN) injection 4 mg  4 mg IntraVENous Q4H PRN    mineral oil (FLEET) enema   Rectal NOW    metroNIDAZOLE (FLAGYL) IVPB premix 500 mg  500 mg IntraVENous Q8H    methylnaltrexone (RELISTOR) injection 12 mg  12 mg SubCUTAneous ONCE     REVIEW OF SYSTEMS:     []     Unable to obtain  ROS due to  []    mental status change  []    sedated   []    intubated   [x]    Total of 11 systems reviewed as follows:  Const:  negative fever, negative chills, negative weight loss  Eyes:   negative diplopia or visual changes, negative eye pain  ENT:   negative coryza, negative sore throat  Resp:   negative cough, hemoptysis, dyspnea  Cards:  negative for chest pain, palpitations, lower extremity edema  :  negative for frequency, dysuria and hematuria  Skin:   negative for rash and pruritus  Heme:  negative for easy bruising and gum/nose bleeding  MS:  negative for myalgias, arthralgias, back pain and muscle weakness  Neurolo:  negative for headaches, dizziness, vertigo, memory problems   Psych:  negative for feelings of anxiety, depression     Pertinent Positives include :    Objective:   VITALS:    Visit Vitals    /64 (BP 1 Location: Right arm, BP Patient Position: Lying left side)    Pulse 75    Temp 98 °F (36.7 °C)    Resp 16    Ht 5' 5\" (1.651 m)    Wt 62.6 kg (138 lb)    LMP 2015 (Approximate)    SpO2 97%    Breastfeeding No    BMI 22.96 kg/m2     Temp (24hrs), Av.8 °F (36.6 °C), Min:97.6 °F (36.4 °C), Max:98 °F (36.7 °C)    PHYSICAL EXAM:   General:    Alert, cooperative, +mild distress, appears stated age. Head:   Normocephalic, without obvious abnormality, atraumatic. Eyes:   Conjunctivae clear, anicteric sclerae. Pupils are equal  Nose:  Nares normal. No drainage or sinus tenderness.   Throat:    Lips, mucosa, and tongue normal.  No Thrush  Neck:  Supple, symmetrical,  no adenopathy, thyroid: non tender  Back:    Symmetric,  No CVA tenderness. Lungs:   CTA bilaterally. No wheezing/rhonchi/rales. Chest wall:  No tenderness or deformity. No Accessory muscle use. Heart:   Regular rate and rhythm,  no murmur, rub or gallop. Abdomen:   Soft, +diffuse TTP w/o peritoneal signs, Not distended. Bowel sounds normal. No masses  Extremities: Atraumatic, No cyanosis. No edema. No clubbing  Skin:     Texture, turgor normal. No rashes/lesions/jaundice  Lymph: Cervical, supraclavicular normal.  Psych:  Good insight. Not depressed. Not anxious or agitated. Neurologic: EOMs intact. No facial asymmetry. No aphasia or slurred speech. Normal  strength, A/O X 3. LAB DATA REVIEWED:    Recent Results (from the past 48 hour(s))   CBC WITH AUTOMATED DIFF    Collection Time: 08/26/17 10:40 PM   Result Value Ref Range    WBC 20.9 (H) 3.6 - 11.0 K/uL    RBC 4.54 3.80 - 5.20 M/uL    HGB 15.3 11.5 - 16.0 g/dL    HCT 42.3 35.0 - 47.0 %    MCV 93.2 80.0 - 99.0 FL    MCH 33.7 26.0 - 34.0 PG    MCHC 36.2 30.0 - 36.5 g/dL    RDW 13.0 11.5 - 14.5 %    PLATELET 343 642 - 085 K/uL    NEUTROPHILS 90 (H) 32 - 75 %    LYMPHOCYTES 6 (L) 12 - 49 %    MONOCYTES 4 (L) 5 - 13 %    EOSINOPHILS 0 0 - 7 %    BASOPHILS 0 0 - 1 %    ABS. NEUTROPHILS 18.8 (H) 1.8 - 8.0 K/UL    ABS. LYMPHOCYTES 1.3 0.8 - 3.5 K/UL    ABS. MONOCYTES 0.9 0.0 - 1.0 K/UL    ABS. EOSINOPHILS 0.0 0.0 - 0.4 K/UL    ABS.  BASOPHILS 0.0 0.0 - 0.1 K/UL   METABOLIC PANEL, COMPREHENSIVE    Collection Time: 08/26/17 10:40 PM   Result Value Ref Range    Sodium 135 (L) 136 - 145 mmol/L    Potassium 4.0 3.5 - 5.1 mmol/L    Chloride 99 97 - 108 mmol/L    CO2 28 21 - 32 mmol/L    Anion gap 8 5 - 15 mmol/L    Glucose 159 (H) 65 - 100 mg/dL    BUN 22 (H) 6 - 20 MG/DL    Creatinine 0.91 0.55 - 1.02 MG/DL    BUN/Creatinine ratio 24 (H) 12 - 20      GFR est AA >60 >60 ml/min/1.73m2    GFR est non-AA >60 >60 ml/min/1.73m2    Calcium 9.6 8.5 - 10.1 MG/DL    Bilirubin, total 0.4 0.2 - 1.0 MG/DL    ALT (SGPT) 24 12 - 78 U/L    AST (SGOT) 19 15 - 37 U/L    Alk. phosphatase 72 45 - 117 U/L    Protein, total 8.1 6.4 - 8.2 g/dL    Albumin 4.1 3.5 - 5.0 g/dL    Globulin 4.0 2.0 - 4.0 g/dL    A-G Ratio 1.0 (L) 1.1 - 2.2     LIPASE    Collection Time: 08/26/17 10:40 PM   Result Value Ref Range    Lipase 91 73 - 393 U/L   LACTIC ACID    Collection Time: 08/26/17 10:40 PM   Result Value Ref Range    Lactic acid 1.9 0.4 - 2.0 MMOL/L   URINALYSIS W/ REFLEX CULTURE    Collection Time: 08/27/17  1:15 AM   Result Value Ref Range    Color DARK YELLOW      Appearance CLEAR CLEAR      Specific gravity 1.020 1.003 - 1.030      pH (UA) 5.0 5.0 - 8.0      Protein NEGATIVE  NEG mg/dL    Glucose NEGATIVE  NEG mg/dL    Ketone TRACE (A) NEG mg/dL    Blood NEGATIVE  NEG      Urobilinogen 0.2 0.2 - 1.0 EU/dL    Nitrites NEGATIVE  NEG      Leukocyte Esterase TRACE (A) NEG      UA:UC IF INDICATED CULTURE NOT INDICATED BY UA RESULT CNI      WBC 0-4 0 - 4 /hpf    RBC 0-5 0 - 5 /hpf    Epithelial cells FEW FEW /lpf    Bacteria NEGATIVE  NEG /hpf    Hyaline cast 0-2 0 - 5 /lpf   HCG URINE, QL    Collection Time: 08/27/17  1:15 AM   Result Value Ref Range    HCG urine, Ql. NEGATIVE  NEG     BILIRUBIN, CONFIRM    Collection Time: 08/27/17  1:15 AM   Result Value Ref Range    Bilirubin UA, confirm NEGATIVE  NEG       IMAGING RESULTS:   [x]      I have personally reviewed the actual   []    CXR  []    CT  []     US    Assessment/Plan:      Active Problems:    Abdominal pain (8/27/2017)      Constipation (8/27/2017)      Intractable nausea and vomiting (8/27/2017)    1. Abnormal CT (fluid filled/distended right colon without evidence of obstruction; I personally reviewed CT with Radiology). Small bowel is not distended  2. Diffuse AP  3. N/v   4. Constipation (possibly opoid induced)  5. Leukocytosis  6.  H/o C diff after abx for perirectal abscess  ___________________________________________________  RECOMMENDATIONS:    - clear liquid diet, make NPO if patient has recurrent n/v  - enema  - Methylnaltrexone x1 given suspicion for opoid induced constipation (given recent Oxycodone usage)  - avoid opiates as much as possible  - agree with abx   - outpatient EGD scheduled to evaluate n/v      ___________________________________________________  Care Plan discussed with:    [x]    Patient   []    Family   [x]    Nursing   []    Attending     ___________________________________________________  GI: Cande eHnry MD

## 2017-08-27 NOTE — H&P
Hospitalist Admission Note    NAME: Irvin Bateman   :  1980   MRN:  [id number]     Date/Time:  2017 5:59 AM    Patient PCP: Boal Erazo MD  ________________________________________________________________________    My assessment of this patient's clinical condition and my plan of care is as follows. Assessment / Plan:  Abdominal pain with nausea, vomiting, and constipation  -CT A/P with \"Abnormal, but nonspecific appearance of the ascending and transverse colon which is fluid-filled, becoming progressively more normal distally. This finding is nonspecific. \"  -Constipation x 1 week, still having flatus. Has been taking oxycodone since ED visit on 17 when she presented with RUQ pain. US abd at the time showed \"Partly distended gallbladder without gallstones or biliary duct dilatation. \" HIDA was wnl.  -She has hx of hysterectomy. Hx of perirectal abscess s/p I&D in May 2017.  -Lipase, lft's, and bilirubin wnl.  -prn IV antiemetic and prn IV dilaudid, attempt to minimize narcotics. -clear liquid diet  -IVF  -started on golytely in ED.  -mineral oil enema.  -follow up kub prn  -Start empiric IV flagyl with leukocytosis, abnormal CT, and reported hx of C.diff following antibiotics for her perirectal abscess in May.  -Will consult GI for assistance in setting of abnormal CT and persistent pain leading to two recent ED visits. Leukocytosis  -WBC 20.9 with neutrophilia. Afebrile,  Lactic 1.9. UA without evidence of infection. No pulmonary symptoms.  -Check blood cultures.  -Start empiric IV flagyl with leukocytosis, abnormal CT, and reported hx of C.diff following antibiotics for her perirectal abscess in May.     ADHD/Anxiety/depression  -on vyvanse at home.  -continue pta lexapro, prn klonipin    Tobacco use  -patient says she uses a vape  - cessation  -refuses nicotine patch    Code Status: full  Surrogate Decision Maker: father, Anastacia Car [phone]  DVT Prophylaxis: scds  GI Prophylaxis: not indicated  Baseline: independent        Subjective:   CHIEF COMPLAINT: abdominal pain, N/V and constipation    HISTORY OF PRESENT ILLNESS:     Xander Alvares is a 40 y.o.  female with pmh of anxiety/depression/adhd who presents with complaint of abdominal pain and associated N/V and constipation. She is a poor historian as she just received IV dilaudid in the ED and remains somnolent. Unable to quantify how long she has had abdominal pain but per chart review she was seen in the ED on 8/22/17 with RUQ pain. Currently points to the epigastric region as being most painful. Says she has not had a BM in ~1 week. Reports trying an otc laxative without success. Still passing gas. Vomiting started last night and she decided she needed to present to the ED. Reports a history of hysterectomy years ago and I&D of perirectal abscess May of this year. On her last ED visit she had a RUQ US which showed a partly distended gallbladder without gallstones or biliary duct dilatation, she also had a normal HIDA scan, and was sent home with oxycodone and zofran. In ED patient is afebrile with stable vitals. CT AP shows \"Abnormal, but nonspecific appearance of the ascending and transverse colon which is fluid-filled, becoming progressively more normal distally. This finding is nonspecific. \"  On lab work her WBC is 20 with no evidence of infection. She does report history of C diff following antibiotics for perirectal abscess in May. Lipase and LFTs are wnl. She has received IV dilaudid and zofran in the ED with poor control of her symptoms. We were asked to admit for work up and evaluation of the above problems.      Past Medical History:   Diagnosis Date    ADHD (attention deficit hyperactivity disorder)     Anxiety     Anxiety     Asthma     CHILDHOOD    Chronic back pain     Chronic pain     DISC ISSUES IN BACK    Psychiatric disorder     ANXIETY        Past Surgical History: Procedure Laterality Date    BREAST SURGERY PROCEDURE UNLISTED  2002    reduction    HX BREAST REDUCTION      HX CYST REMOVAL Right     ganglion cyst removed from right wrist    HX GYN      BTL    HX GYN      D&C X 2    HX GYN  2012    Hysterectomy    HX OTHER SURGICAL  5/2013    cyst removed from right hand    HX TONSIL AND ADENOIDECTOMY      HX TONSILLECTOMY      HX WISDOM TEETH EXTRACTION         Social History   Substance Use Topics    Smoking status: Current Some Day Smoker     Packs/day: 0.25     Years: 19.00    Smokeless tobacco: Current User      Comment: smokes vapor cig     Alcohol use 0.0 oz/week     0 Standard drinks or equivalent per week      Comment: Socially. Family History   Problem Relation Age of Onset    Anxiety Mother     Hypertension Mother     COPD Mother     Arthritis-osteo Mother     Psychiatric Disorder Mother     Hypertension Father     Cancer Brother     Schizophrenia Maternal Aunt     Heart Disease Maternal Grandfather     Stroke Maternal Grandfather     Cancer Paternal Grandmother     Anesth Problems Neg Hx      Allergies   Allergen Reactions    Other Food Itching     Fresh fruit, cannot be specific    Codeine Hives     Allergic to tylenol with codeine combo    Tylenol-Codeine #2 Hives    Nuts [Tree Nut] Itching        Prior to Admission medications    Medication Sig Start Date End Date Taking? Authorizing Provider   ondansetron (ZOFRAN ODT) 4 mg disintegrating tablet Take 1 Tab by mouth every eight (8) hours as needed for Nausea. 8/22/17  Yes Celia Betancourt MD   dicyclomine (BENTYL) 20 mg tablet Take 1 Tab by mouth every six (6) hours for 20 doses. 8/22/17 8/27/17 Yes Riky Nino MD   clonazePAM (KLONOPIN) 0.5 mg tablet Take 0.5 mg by mouth two (2) times a day. Yes Daniela Oates MD   Lisdexamfetamine (VYVANSE) 50 mg cap Take by mouth daily. Yes Daniela Oates MD   escitalopram oxalate (LEXAPRO) 20 mg tablet Take 20 mg by mouth daily.    Yes Daniela Oates MD   ALPRAZolam Sharyn Moose) 1 mg tablet Take 1-2 mg by mouth three (3) times daily as needed. 3/18/16  Yes Historical Provider   famotidine (PEPCID) 20 mg tablet Take 1 Tab by mouth two (2) times a day. 8/22/17   Pradeep Haddad MD   oxyCODONE IR (ROXICODONE) 5 mg immediate release tablet Take 1 Tab by mouth every four (4) hours as needed for Pain for up to 6 doses. Max Daily Amount: 30 mg. 8/22/17   Alicia España MD       REVIEW OF SYSTEMS:     I am not able to complete the review of systems because:    The patient is intubated and sedated    The patient has altered mental status due to his acute medical problems    The patient has baseline aphasia from prior stroke(s)    The patient has baseline dementia and is not reliable historian    The patient is in acute medical distress and unable to provide information           Total of 12 systems reviewed as follows:       POSITIVE= underlined text  Negative = text not underlined  General:  fever, chills, sweats, generalized weakness, weight loss/gain,      loss of appetite   Eyes:    blurred vision, eye pain, loss of vision, double vision  ENT:    rhinorrhea, pharyngitis   Respiratory:   cough, sputum production, SOB, HADDAD, wheezing, pleuritic pain   Cardiology:   chest pain, palpitations, orthopnea, PND, edema, syncope   Gastrointestinal:  abdominal pain , N/V, diarrhea, dysphagia, constipation, bleeding   Genitourinary:  frequency, urgency, dysuria, hematuria, incontinence   Muskuloskeletal :  arthralgia, myalgia, back pain  Hematology:  easy bruising, nose or gum bleeding, lymphadenopathy   Dermatological: rash, ulceration, pruritis, color change / jaundice  Endocrine:   hot flashes or polydipsia   Neurological:  headache, dizziness, confusion, focal weakness, paresthesia,     Speech difficulties, memory loss, gait difficulty  Psychological: Feelings of anxiety, depression, agitation    Objective:   VITALS:    Visit Vitals    BP (!) 127/91    Pulse 63  Temp 97.6 °F (36.4 °C)    Resp 22    Ht 5' 5\" (1.651 m)    Wt 62.6 kg (138 lb)    SpO2 93%    BMI 22.96 kg/m2       PHYSICAL EXAM:    General:    Somnolent follow IV pain medications in ED, cooperative, no distress, appears stated age. HEENT: Atraumatic, anicteric sclerae, pink conjunctivae     No oral ulcers, mucosa moist, throat clear, dentition fair  Neck:  Supple, symmetrical,  thyroid: non tender  Lungs:   Clear to auscultation bilaterally. No Wheezing or Rhonchi. No rales. Chest wall:  No tenderness  No Accessory muscle use. Heart:   Regular  rhythm,  No  murmur   No edema  Abdomen:   Soft, mild TTP diffusely. Not distended. Bowel sounds normal  Extremities: No cyanosis. No clubbing,      Skin turgor normal, Capillary refill normal, Radial dial pulse 2+  Skin:     Not pale. Not Jaundiced  No rashes   Psych:  Good insight. Not depressed. Not anxious or agitated. Neurologic: EOMs intact. No facial asymmetry. No aphasia or slurred speech. Symmetrical strength, Sensation grossly intact.  Alert and oriented X 4.     _______________________________________________________________________  Care Plan discussed with:    Comments   Patient y    [facility]:      _______________________________________________________________________  Expected  Disposition:   Home with Family x   HH/PT/OT/RN    SNF/LTC    [place]    ________________________________________________________________________  TOTAL TIME:  61 Minutes    Critical Care Provided     Minutes non procedure based      Comments    x Reviewed previous records   >50% of visit spent in counseling and coordination of care  Discussion with patient and/or family and questions answered       ________________________________________________________________________  Signed: Miguelina Tillman MD    Procedures: see electronic medical records for all procedures/Xrays and details which were not copied into this note but were reviewed prior to creation of Plan. LAB DATA REVIEWED:    Recent Results (from the past 24 hour(s))   CBC WITH AUTOMATED DIFF    Collection Time: 08/26/17 10:40 PM   Result Value Ref Range    WBC 20.9 (H) 3.6 - 11.0 K/uL    RBC 4.54 3.80 - 5.20 M/uL    HGB 15.3 11.5 - 16.0 g/dL    HCT 42.3 35.0 - 47.0 %    MCV 93.2 80.0 - 99.0 FL    MCH 33.7 26.0 - 34.0 PG    MCHC 36.2 30.0 - 36.5 g/dL    RDW 13.0 11.5 - 14.5 %    PLATELET 889 693 - 110 K/uL    NEUTROPHILS 90 (H) 32 - 75 %    LYMPHOCYTES 6 (L) 12 - 49 %    MONOCYTES 4 (L) 5 - 13 %    EOSINOPHILS 0 0 - 7 %    BASOPHILS 0 0 - 1 %    ABS. NEUTROPHILS 18.8 (H) 1.8 - 8.0 K/UL    ABS. LYMPHOCYTES 1.3 0.8 - 3.5 K/UL    ABS. MONOCYTES 0.9 0.0 - 1.0 K/UL    ABS. EOSINOPHILS 0.0 0.0 - 0.4 K/UL    ABS. BASOPHILS 0.0 0.0 - 0.1 K/UL   METABOLIC PANEL, COMPREHENSIVE    Collection Time: 08/26/17 10:40 PM   Result Value Ref Range    Sodium 135 (L) 136 - 145 mmol/L    Potassium 4.0 3.5 - 5.1 mmol/L    Chloride 99 97 - 108 mmol/L    CO2 28 21 - 32 mmol/L    Anion gap 8 5 - 15 mmol/L    Glucose 159 (H) 65 - 100 mg/dL    BUN 22 (H) 6 - 20 MG/DL    Creatinine 0.91 0.55 - 1.02 MG/DL    BUN/Creatinine ratio 24 (H) 12 - 20      GFR est AA >60 >60 ml/min/1.73m2    GFR est non-AA >60 >60 ml/min/1.73m2    Calcium 9.6 8.5 - 10.1 MG/DL    Bilirubin, total 0.4 0.2 - 1.0 MG/DL    ALT (SGPT) 24 12 - 78 U/L    AST (SGOT) 19 15 - 37 U/L    Alk.  phosphatase 72 45 - 117 U/L    Protein, total 8.1 6.4 - 8.2 g/dL    Albumin 4.1 3.5 - 5.0 g/dL    Globulin 4.0 2.0 - 4.0 g/dL    A-G Ratio 1.0 (L) 1.1 - 2.2     LIPASE    Collection Time: 08/26/17 10:40 PM   Result Value Ref Range    Lipase 91 73 - 393 U/L   LACTIC ACID    Collection Time: 08/26/17 10:40 PM   Result Value Ref Range    Lactic acid 1.9 0.4 - 2.0 MMOL/L   URINALYSIS W/ REFLEX CULTURE    Collection Time: 08/27/17  1:15 AM   Result Value Ref Range    Color DARK YELLOW      Appearance CLEAR CLEAR      Specific gravity 1.020 1.003 - 1.030      pH (UA) 5.0 5.0 - 8.0      Protein NEGATIVE  NEG mg/dL    Glucose NEGATIVE  NEG mg/dL    Ketone TRACE (A) NEG mg/dL    Blood NEGATIVE  NEG      Urobilinogen 0.2 0.2 - 1.0 EU/dL    Nitrites NEGATIVE  NEG      Leukocyte Esterase TRACE (A) NEG      UA:UC IF INDICATED CULTURE NOT INDICATED BY UA RESULT CNI      WBC 0-4 0 - 4 /hpf    RBC 0-5 0 - 5 /hpf    Epithelial cells FEW FEW /lpf    Bacteria NEGATIVE  NEG /hpf    Hyaline cast 0-2 0 - 5 /lpf   HCG URINE, QL    Collection Time: 08/27/17  1:15 AM   Result Value Ref Range    HCG urine, Ql. NEGATIVE  NEG     BILIRUBIN, CONFIRM    Collection Time: 08/27/17  1:15 AM   Result Value Ref Range    Bilirubin UA, confirm NEGATIVE  NEG

## 2017-08-27 NOTE — ED NOTES
TRANSFER - OUT REPORT:    Verbal report given to Verito Angela RN (name) on Liliam Dockery  being transferred to gen surg (unit) for routine progression of care       Report consisted of patients Situation, Background, Assessment and   Recommendations(SBAR). Information from the following report(s) SBAR, Kardex, ED Summary, [facility] and Recent Results was reviewed with the receiving nurse. Lines:   Peripheral IV 08/27/17 Left Antecubital (Active)   Site Assessment Clean, dry, & intact 8/27/2017  3:49 AM   Phlebitis Assessment 0 8/27/2017  3:49 AM   Infiltration Assessment 0 8/27/2017  3:49 AM   Dressing Status Clean, dry, & intact 8/27/2017  3:49 AM   Dressing Type Transparent 8/27/2017  3:49 AM   Hub Color/Line Status Pink 8/27/2017  3:49 AM        Opportunity for questions and clarification was provided.       Patient transported with:   [employer]

## 2017-08-27 NOTE — PROGRESS NOTES
End of Shift Nursing Note    Bedside shift change report given to Siena Demarco (oncoming nurse) by Porsche Herring (offgoing nurse). Report included the following information SBAR, Kardex, ED Summary, Intake/Output, MAR and Recent Results. Zone Phone:       Significant changes during shift:   Admitted via ED, taking Go-Lytely, Fleet Enema x 1. PT taking Dilaudid x 3, 0.5 mg q 4 hours and 650 Tylenol x 2. NS running at 75 ml/hr and 500 mg Flagyl q 8 hours. PT reports 8/10 pain at 1800. Non-emergent issues for physician to address:   N/A     Number times ambulated in hallway past shift: 0. Number of times OOB to chair past shift: 0    POD #:      Vital Signs:    Temp: 98.4 °F (36.9 °C)     Pulse (Heart Rate): 85     BP: 95/52     Resp Rate: 16     O2 Sat (%): 98 %    Lines & Drains:     Urinary Catheter? No   Placement Date: N/A   Medical Necessity: N/A  Central Line? No   Placement Date: N/A   Medical Necessity: N/A  PICC Line? No   Placement Date: N/A   Medical Necessity: N/A    NG tube [] in [] removed [] not applicable   Drains [] in [] removed [] not applicable     Skin Integrity:      Wounds: no   Dressings Present: no    Wound Concerns: no      GI:    Current diet:  DIET CLEAR LIQUID    Nausea: YES  Vomiting: YES  Bowel Sounds: YES  Flatus: NO  Last Bowel Movement: today   Appearance: 7 solid formed one half inch pieces. Respiratory:  Supplemental O2: No      Device: N/A   via N/A Liters/min     Incentive Spirometer: NO  Volume: N/A  Coughing and Deep Breathing: NO  Oral Care: NO  Understanding (patient/family education): NO   Getting out of bed: YES  Head of bed elevation: YES    Patient Safety:    Falls Score: 1  Mobility Score: 1  Bed Alarm On? No  Sitter? No    Ordered SCD's were withheld due to limiting PT's mobility and ability to get to the bedside commode rapidly. Opportunity for questions and clarification was given to oncoming nurse.  Patient bed is in side lying position, side rails are up x 2, door & observation blinds open as needed, call bell within reach and patient not in distress.     Daniel Whyte

## 2017-08-27 NOTE — PROGRESS NOTES
Call placed to Dr. Mildred Johnston re:  Fleets mineral oil enema not available, pt drank about 4 cups of Golytely so far and no results,  Pt had SQ injection of Relistor with no relief,  Can she have a plain fleets enema? Pt also requests oral pain medicine in between IV dilaudid every 4 hours,  Although pt is now sleeping after IV Zofran. Per Dr. Mildred Johnston,  No change in opiates is recommended,  Fleets enema can be changed to plain Fleets x2. Order placed in system.

## 2017-08-27 NOTE — PROGRESS NOTES
Primary Nurse Vicki Edge and Rosy Romero RN performed a dual skin assessment on this patient No impairment noted  Nuno score is 22    PT has three scratches on her L forearm and some scabs on her right lower leg.

## 2017-08-28 ENCOUNTER — APPOINTMENT (OUTPATIENT)
Dept: GENERAL RADIOLOGY | Age: 37
DRG: 392 | End: 2017-08-28
Attending: INTERNAL MEDICINE
Payer: COMMERCIAL

## 2017-08-28 LAB
ALBUMIN SERPL-MCNC: 2.6 G/DL (ref 3.5–5)
ALBUMIN/GLOB SERPL: 1 {RATIO} (ref 1.1–2.2)
ALP SERPL-CCNC: 47 U/L (ref 45–117)
ALT SERPL-CCNC: 13 U/L (ref 12–78)
ANION GAP SERPL CALC-SCNC: 7 MMOL/L (ref 5–15)
AST SERPL-CCNC: 10 U/L (ref 15–37)
BASOPHILS # BLD: 0 K/UL (ref 0–0.1)
BASOPHILS NFR BLD: 0 % (ref 0–1)
BILIRUB SERPL-MCNC: 0.7 MG/DL (ref 0.2–1)
BUN SERPL-MCNC: 22 MG/DL (ref 6–20)
BUN/CREAT SERPL: 40 (ref 12–20)
CALCIUM SERPL-MCNC: 7.4 MG/DL (ref 8.5–10.1)
CHLORIDE SERPL-SCNC: 104 MMOL/L (ref 97–108)
CO2 SERPL-SCNC: 27 MMOL/L (ref 21–32)
CREAT SERPL-MCNC: 0.55 MG/DL (ref 0.55–1.02)
EOSINOPHIL # BLD: 0 K/UL (ref 0–0.4)
EOSINOPHIL NFR BLD: 0 % (ref 0–7)
ERYTHROCYTE [DISTWIDTH] IN BLOOD BY AUTOMATED COUNT: 13.3 % (ref 11.5–14.5)
GLOBULIN SER CALC-MCNC: 2.7 G/DL (ref 2–4)
GLUCOSE SERPL-MCNC: 104 MG/DL (ref 65–100)
HCT VFR BLD AUTO: 30.8 % (ref 35–47)
HGB BLD-MCNC: 10.7 G/DL (ref 11.5–16)
LYMPHOCYTES # BLD: 1.3 K/UL (ref 0.8–3.5)
LYMPHOCYTES NFR BLD: 9 % (ref 12–49)
MCH RBC QN AUTO: 32.3 PG (ref 26–34)
MCHC RBC AUTO-ENTMCNC: 34.7 G/DL (ref 30–36.5)
MCV RBC AUTO: 93.1 FL (ref 80–99)
MONOCYTES # BLD: 1.1 K/UL (ref 0–1)
MONOCYTES NFR BLD: 8 % (ref 5–13)
NEUTS SEG # BLD: 12 K/UL (ref 1.8–8)
NEUTS SEG NFR BLD: 83 % (ref 32–75)
PLATELET # BLD AUTO: 145 K/UL (ref 150–400)
POTASSIUM SERPL-SCNC: 3.2 MMOL/L (ref 3.5–5.1)
PROT SERPL-MCNC: 5.3 G/DL (ref 6.4–8.2)
RBC # BLD AUTO: 3.31 M/UL (ref 3.8–5.2)
SODIUM SERPL-SCNC: 138 MMOL/L (ref 136–145)
WBC # BLD AUTO: 14.4 K/UL (ref 3.6–11)

## 2017-08-28 PROCEDURE — 85025 COMPLETE CBC W/AUTO DIFF WBC: CPT | Performed by: INTERNAL MEDICINE

## 2017-08-28 PROCEDURE — 96367 TX/PROPH/DG ADDL SEQ IV INF: CPT

## 2017-08-28 PROCEDURE — 96361 HYDRATE IV INFUSION ADD-ON: CPT

## 2017-08-28 PROCEDURE — 74011250637 HC RX REV CODE- 250/637: Performed by: INTERNAL MEDICINE

## 2017-08-28 PROCEDURE — 74011250636 HC RX REV CODE- 250/636: Performed by: HOSPITALIST

## 2017-08-28 PROCEDURE — 74011250636 HC RX REV CODE- 250/636: Performed by: INTERNAL MEDICINE

## 2017-08-28 PROCEDURE — 65270000029 HC RM PRIVATE

## 2017-08-28 PROCEDURE — 74020 XR ABD (AP AND ERECT OR DECUB): CPT

## 2017-08-28 PROCEDURE — 36415 COLL VENOUS BLD VENIPUNCTURE: CPT | Performed by: INTERNAL MEDICINE

## 2017-08-28 PROCEDURE — 96366 THER/PROPH/DIAG IV INF ADDON: CPT

## 2017-08-28 PROCEDURE — 80053 COMPREHEN METABOLIC PANEL: CPT | Performed by: INTERNAL MEDICINE

## 2017-08-28 PROCEDURE — 96376 TX/PRO/DX INJ SAME DRUG ADON: CPT

## 2017-08-28 PROCEDURE — 99218 HC RM OBSERVATION: CPT

## 2017-08-28 PROCEDURE — 74011000250 HC RX REV CODE- 250: Performed by: INTERNAL MEDICINE

## 2017-08-28 RX ORDER — POTASSIUM CHLORIDE 7.45 MG/ML
10 INJECTION INTRAVENOUS
Status: COMPLETED | OUTPATIENT
Start: 2017-08-28 | End: 2017-08-28

## 2017-08-28 RX ORDER — HYDROMORPHONE HYDROCHLORIDE 1 MG/ML
1 INJECTION, SOLUTION INTRAMUSCULAR; INTRAVENOUS; SUBCUTANEOUS ONCE
Status: COMPLETED | OUTPATIENT
Start: 2017-08-28 | End: 2017-08-28

## 2017-08-28 RX ORDER — HYDROMORPHONE HYDROCHLORIDE 1 MG/ML
0.7 INJECTION, SOLUTION INTRAMUSCULAR; INTRAVENOUS; SUBCUTANEOUS
Status: DISCONTINUED | OUTPATIENT
Start: 2017-08-28 | End: 2017-08-28

## 2017-08-28 RX ORDER — HYDROMORPHONE HYDROCHLORIDE 1 MG/ML
1 INJECTION, SOLUTION INTRAMUSCULAR; INTRAVENOUS; SUBCUTANEOUS
Status: DISCONTINUED | OUTPATIENT
Start: 2017-08-28 | End: 2017-08-30 | Stop reason: HOSPADM

## 2017-08-28 RX ADMIN — POTASSIUM CHLORIDE 10 MEQ: 10 INJECTION, SOLUTION INTRAVENOUS at 14:55

## 2017-08-28 RX ADMIN — Medication 10 ML: at 19:03

## 2017-08-28 RX ADMIN — HYDROMORPHONE HYDROCHLORIDE 1 MG: 1 INJECTION, SOLUTION INTRAMUSCULAR; INTRAVENOUS; SUBCUTANEOUS at 10:55

## 2017-08-28 RX ADMIN — ESCITALOPRAM OXALATE 20 MG: 10 TABLET ORAL at 09:40

## 2017-08-28 RX ADMIN — HYDROMORPHONE HYDROCHLORIDE 0.5 MG: 2 INJECTION, SOLUTION INTRAMUSCULAR; INTRAVENOUS; SUBCUTANEOUS at 09:35

## 2017-08-28 RX ADMIN — HYDROMORPHONE HYDROCHLORIDE 1 MG: 1 INJECTION, SOLUTION INTRAMUSCULAR; INTRAVENOUS; SUBCUTANEOUS at 15:02

## 2017-08-28 RX ADMIN — Medication 10 ML: at 09:35

## 2017-08-28 RX ADMIN — Medication 10 ML: at 10:55

## 2017-08-28 RX ADMIN — ACETAMINOPHEN 650 MG: 325 TABLET ORAL at 00:21

## 2017-08-28 RX ADMIN — POTASSIUM CHLORIDE 10 MEQ: 10 INJECTION, SOLUTION INTRAVENOUS at 17:56

## 2017-08-28 RX ADMIN — ACETAMINOPHEN 650 MG: 325 TABLET ORAL at 07:24

## 2017-08-28 RX ADMIN — SODIUM CHLORIDE 75 ML/HR: 900 INJECTION, SOLUTION INTRAVENOUS at 18:02

## 2017-08-28 RX ADMIN — HYDROMORPHONE HYDROCHLORIDE 1 MG: 1 INJECTION, SOLUTION INTRAMUSCULAR; INTRAVENOUS; SUBCUTANEOUS at 19:03

## 2017-08-28 RX ADMIN — METRONIDAZOLE 500 MG: 500 INJECTION, SOLUTION INTRAVENOUS at 07:30

## 2017-08-28 RX ADMIN — POTASSIUM CHLORIDE 10 MEQ: 10 INJECTION, SOLUTION INTRAVENOUS at 13:18

## 2017-08-28 RX ADMIN — POTASSIUM CHLORIDE 10 MEQ: 10 INJECTION, SOLUTION INTRAVENOUS at 11:43

## 2017-08-28 RX ADMIN — Medication 10 ML: at 15:01

## 2017-08-28 RX ADMIN — METRONIDAZOLE 500 MG: 500 INJECTION, SOLUTION INTRAVENOUS at 00:21

## 2017-08-28 RX ADMIN — METRONIDAZOLE 500 MG: 500 INJECTION, SOLUTION INTRAVENOUS at 23:01

## 2017-08-28 RX ADMIN — Medication 10 ML: at 21:16

## 2017-08-28 RX ADMIN — HYDROMORPHONE HYDROCHLORIDE 0.5 MG: 2 INJECTION, SOLUTION INTRAMUSCULAR; INTRAVENOUS; SUBCUTANEOUS at 02:13

## 2017-08-28 RX ADMIN — HYDROMORPHONE HYDROCHLORIDE 1 MG: 1 INJECTION, SOLUTION INTRAMUSCULAR; INTRAVENOUS; SUBCUTANEOUS at 22:58

## 2017-08-28 RX ADMIN — ACETAMINOPHEN 650 MG: 325 TABLET ORAL at 13:31

## 2017-08-28 RX ADMIN — CLONAZEPAM 0.5 MG: 0.5 TABLET ORAL at 16:47

## 2017-08-28 RX ADMIN — ACETAMINOPHEN 650 MG: 325 TABLET ORAL at 19:48

## 2017-08-28 RX ADMIN — HYDROMORPHONE HYDROCHLORIDE 1 MG: 1 INJECTION, SOLUTION INTRAMUSCULAR; INTRAVENOUS; SUBCUTANEOUS at 05:21

## 2017-08-28 RX ADMIN — METRONIDAZOLE 500 MG: 500 INJECTION, SOLUTION INTRAVENOUS at 16:30

## 2017-08-28 NOTE — ROUTINE PROCESS
Accessed patient record for purpose of assisting with nursing care. 1218  Patient stated her diet order was now supposed to be clear liquid when she received her lunch tray, paged Dr. Cassandra Marquez to confirm. Order changed. New Tray rquested.

## 2017-08-28 NOTE — PROGRESS NOTES
End of Shift Nursing Note    Bedside shift change report given to Benitez Back (oncoming nurse) by Kristofer Hull (offgoing nurse). Report included the following information Kardex, Procedure Summary and MAR. Zone Phone:   N/A    Significant changes during shift:    PT ambulated once in the calle today. Pt had Tylenol 650 mg q 6 hrs at 0724 and 1331 hrs. PT had clonazepam 0.5 mg (oral) at 1647. PT had Dilaudid 1 mg at 1055 and 1502 hrs. PT had Dilaudid 0.5 hrs at 0935 hrs. PT's diet was changed to Full Liquid diet. After dinner, PT was sobbing and stated she has severe cramps and her pain is 9.5/10. No pain meds available until 1902. Non-emergent issues for physician to address:   N/A     Number times ambulated in hallway past shift: 1      Number of times OOB to chair past shift: 0    POD #: N/A     Vital Signs:    Temp: 98.1 °F (36.7 °C)     Pulse (Heart Rate): 82     BP: 97/47     Resp Rate: 16     O2 Sat (%): 99 %    Lines & Drains:     Urinary Catheter? No   Placement Date: N/A   Medical Necessity: N/A  Central Line? No   Placement Date: N/A   Medical Necessity: N/A  PICC Line? No   Placement Date: N/A   Medical Necessity: N/A    NG tube [] in [] removed [x] not applicable   Drains [] in [] removed [x] not applicable     Skin Integrity:      Wounds: no   Dressings Present: no    Wound Concerns: no      GI:    Current diet:  DIET FULL LIQUID  DIET ONE TIME MESSAGE  DIET ONE TIME MESSAGE    Nausea: NO  Vomiting: NO  Bowel Sounds: YES  Flatus: YES  Last Bowel Movement: today   Appearance: Brown and Watery    Respiratory:  Supplemental O2: No      Device: N/A   via N/A Liters/min     Incentive Spirometer: NO  Volume: N/A  Coughing and Deep Breathing: NO  Oral Care: YES  Understanding (patient/family education): YES   Getting out of bed: YES  Head of bed elevation: YES    Patient Safety:    Falls Score: 1  Mobility Score: 1  Bed Alarm On? No  Sitter?  No      Opportunity for questions and clarification was given to oncoming nurse. Patient bed is in low position, side rails are up x 2, door & observation blinds open as needed, call bell within reach and patient not in distress.     Janice Ortez

## 2017-08-28 NOTE — PROGRESS NOTES
Nutrition Assessment:    RECOMMENDATIONS:   Continue diet advancement per GI     DIETITIANS INTERVENTIONS/PLAN:   Advance diet as tolerated  Monitor appetite/PO intake    ASSESSMENT:   Pt admitted with abdominal pain + constipation. PMH: recent C diff. Chart reviewed. GI is following pt, noted possible opioid induced constipation. MST triggered for wt loss, per EMR she has had 8.6% wt loss over the past 3 months. BM noted today, she has abdominal distension. Multiple doses of dilaudid likely contributing to constipation. Labs reviewed, K+ 3.2, being repleted. Will monitor her appetite and diet advances. SUBJECTIVE/OBJECTIVE:     Diet Order: Full liquids  % Eaten:  No data found. Pertinent Medications:flagyl, KCl; [email or web address]); PRN Meds: dilaudid    Chemistries:  Lab Results   Component Value Date/Time    Sodium 138 08/28/2017 03:30 AM    Potassium 3.2 08/28/2017 03:30 AM    Chloride 104 08/28/2017 03:30 AM    CO2 27 08/28/2017 03:30 AM    Anion gap 7 08/28/2017 03:30 AM    Glucose 104 08/28/2017 03:30 AM    BUN 22 08/28/2017 03:30 AM    Creatinine 0.55 08/28/2017 03:30 AM    BUN/Creatinine ratio 40 08/28/2017 03:30 AM    GFR est AA >60 08/28/2017 03:30 AM    GFR est non-AA >60 08/28/2017 03:30 AM    Calcium 7.4 08/28/2017 03:30 AM    AST (SGOT) 10 08/28/2017 03:30 AM    Alk. phosphatase 47 08/28/2017 03:30 AM    Protein, total 5.3 08/28/2017 03:30 AM    Albumin 2.6 08/28/2017 03:30 AM    Globulin 2.7 08/28/2017 03:30 AM    A-G Ratio 1.0 08/28/2017 03:30 AM    ALT (SGPT) 13 08/28/2017 03:30 AM      Anthropometrics: Height: 5' 5\" (165.1 cm) Weight: 62.6 kg (138 lb)    IBW (%IBW):   ( ) UBW (%UBW):   (  %)    BMI: Body mass index is 22.96 kg/(m^2). This BMI is indicative of:  []Underweight   [x]Normal   []Overweight   [] Obesity   [] Extreme Obesity (BMI>40)  Estimated Nutrition Needs (Based on): 1706 Kcals/day (MSJ 1312 x 1.3) , 50 g (0.8gPro/kg) Protein  Carbohydrate:  At Least 130 g/day  Fluids: 1700 mL/day    Last BM: 8/28   []Active     []Hyperactive  [x]Hypoactive       [] Absent   BS  Skin:    [x] Intact   [] Incision  [] Breakdown   [] DTI   [] Tears/Excoriation/Abrasion  []Edema [] Other: Wt Readings from Last 30 Encounters:   08/28/17 62.6 kg (138 lb)   08/22/17 64.5 kg (142 lb 3.2 oz)   05/21/17 72.1 kg (159 lb)   05/19/17 68.4 kg (150 lb 12.7 oz)   04/30/17 70.3 kg (155 lb)   05/05/16 75.3 kg (166 lb)   04/14/16 72.6 kg (160 lb)   03/24/16 70.2 kg (154 lb 12.2 oz)   03/23/16 67.1 kg (148 lb)   01/07/16 67.4 kg (148 lb 9.6 oz)   07/05/15 66.7 kg (147 lb)   06/28/15 66.7 kg (147 lb)   06/10/15 64.4 kg (142 lb)   06/03/15 64.4 kg (142 lb)   05/21/15 65.8 kg (145 lb)   05/19/15 65.8 kg (145 lb)   06/23/13 54 kg (119 lb)      NUTRITION DIAGNOSES:   Problem:  Altered GI function      Etiology: related to opioid induced constipation     Signs/Symptoms: as evidenced by n/v, constipation, need for enemas and liquid diet. NUTRITION INTERVENTIONS:  Meals/Snacks: Other (Continue diet advancement per GI )                  GOAL:   Pt will advance to solid diet and consume >50% of meals with no n/v in 3-5 days.      Cultural, Hinduism, or Ethnic Dietary Needs: None     LEARNING NEEDS (Diet, Food/Nutrient-Drug Interaction):    [x] None Identified   [] Identified and Education Provided/Documented   [] Identified and Pt declined/was not appropriate      [x] Interdisciplinary Care Plan Reviewed/Documented    [x] Participated in Discharge Planning: Regular diet     [] Interdisciplinary Rounds     NUTRITION RISK:    [] High              [x] Moderate           [x]  Low  []  Minimal/Uncompromised    PT SEEN FOR:    []  MD Consult: []Calorie Count      []Diabetic Diet Education        []Diet Education     []Electrolyte Management     []General Nutrition Management and Supplements     []Management of Tube Feeding     []TPN Recommendations    [x]  RN Referral:  [x]MST score >=2     []Enteral/Parenteral Nutrition PTA     []Pregnant: Gestational DM or Multigestation   []  Low BMI  []  DTR Referral       Genia Gong RD, [address]   Pager [phone]  Weekend Pager [phone]

## 2017-08-28 NOTE — PROGRESS NOTES
08/27/ spoke to dr. Luh Peña regarding patients continued complaints of pain and updated on bm which was 2L of liquid with about 5 medium pieces of stool. Was okay to hold enema and golytely. 08/28/17  Patient had 4 more bms since her first.  End of Shift Nursing Note    Bedside shift change report given to Adele Vega (oncoming nurse) by Drew Fallon (offgoing nurse). Report included the following information SBAR, Kardex and MAR. Significant changes during shift:    Had several BM   Non-emergent issues for physician to address:   Continuing to have diffuse abdominal pain. Number times ambulated in hallway past shift: 0      Number of times OOB to chair past shift: 0    Vital Signs:    Temp: 99.6 °F (37.6 °C)     Pulse (Heart Rate): 99     BP: 106/56     Resp Rate: 16     O2 Sat (%): 96 %  GI:    Current diet:  DIET CLEAR LIQUID    Nausea: NO  Vomiting: NO  Bowel Sounds: YES  Flatus: YES  Last Bowel Movement: today   Appearance:     Patient Safety:    Falls Score: 1  Mobility Score: 1  Bed Alarm On? No  Sitter? No      Opportunity for questions and clarification was given to oncoming nurse. Patient bed is in low position, side rails are up x 2, door & observation blinds open as needed, call bell within reach and patient not in distress.     Shari Hannah RN

## 2017-08-28 NOTE — PROGRESS NOTES
Hospitalist Progress Note    NAME: Liliam Dockery   :  1980   MRN:  [id number]   LOS:   1      Assessment / Plan:  Abdominal pain with nausea, vomiting likely due to constipation  -CT A/P with \"Abnormal, but nonspecific appearance of the ascending and transverse colon which is fluid-filled, becoming progressively more normal distally. This finding is nonspecific. \"  -had large BMs yesterday, pain continues  -advance to full liquids  -continue pain control, received dose of methylnaltrexone yesterday  -also concern for ?pain seeking, patient continues to ask for increase in pain med dose    Leukocytosis  -improving, no clear infectious source, consider stress reaction  -Start empiric IV flagyl with leukocytosis, abnormal CT, and reported hx of C.diff following antibiotics for her perirectal abscess in May.     ADHD/Anxiety/depression  -on vyvanse at home.  -continue pta lexapro, prn klonipin     Tobacco use  -patient says she uses a vape  - cessation  -refuses nicotine patch     Code Status: full  Surrogate Decision Maker: fatherCecelia [phone]  DVT Prophylaxis: scds  GI Prophylaxis: not indicated  Baseline: independent    Body mass index is 22.96 kg/(m^2). Subjective:     Chief Complaint: abd pain  Still having abd pain, now in left side, asking for increase in pain med dose        Review of Systems:  Symptom Y/N Comments  Symptom Y/N Comments   Fever/Chills    Chest Pain     Poor Appetite    Edema     Cough    Abdominal Pain     Sputum    Joint Pain     SOB/LOFTON    Pruritis/Rash     Nausea/vomit    Tolerating PT/OT     Diarrhea    Tolerating Diet     Constipation    Other       Could NOT obtain due to:      Objective:     VITALS:   Last 24hrs VS reviewed since prior progress note.  Most recent are:  Patient Vitals for the past 24 hrs:   Temp Pulse Resp BP SpO2   17 0801 98.6 °F (37 °C) 85 16 95/61 100 %   17 0400 98.6 °F (37 °C) 88 16 121/61 98 %   17 2334 99.6 °F (37.6 °C) 99 16 106/56 96 %   08/27/17 1930 98.2 °F (36.8 °C) 86 16 117/68 99 %   08/27/17 1501 98.4 °F (36.9 °C) 85 16 95/52 98 %   08/27/17 1059 98 °F (36.7 °C) 75 16 108/64 97 %       Intake/Output Summary (Last 24 hours) at 08/28/17 1058  Last data filed at 08/27/17 1656   Gross per 24 hour   Intake             1680 ml   Output                0 ml   Net             1680 ml        PHYSICAL EXAM:  General: Alert, cooperative, mild acute distress    EENT:  EOMI. Anicteric sclerae. Mucous membranes moist  Resp:  CTA bilaterally, no wheezing or rales. No accessory muscle use  CV:  Regular rhythm, no edema  GI:  Soft, non distended, tender diffusely. +Bowel sounds  Neurologic:  Alert and oriented X 3, normal speech  Psych:   Good insight, not anxious nor agitated  Skin:  No rashes, no jaundice  ________________________________________________________________________  Care Plan discussed with:    Comments   Patient x    Family      RN     Care Manager     Consultant                        Multidiciplinary team rounds were held today with , nursing, pharmacist and clinical coordinator. Patient's plan of care was discussed; medications were reviewed and discharge planning was addressed. ________________________________________________________________________  Total NON critical care TIME:  30   Minutes  ________________________________________________________________________  Smooth Naylor MD     Procedures: see electronic medical records for all procedures/Xrays and details which were not copied into this note but were reviewed prior to creation of Plan. LABS:  I reviewed today's most current labs and imaging studies.   Pertinent labs include:  Recent Labs      08/28/17 0330 08/26/17   2240   WBC  14.4*  20.9*   HGB  10.7*  15.3   HCT  30.8*  42.3   PLT  145*  214     Recent Labs      08/28/17   0330  08/26/17   2240   NA  138  135*   K  3.2*  4.0   CL  104  99   CO2  27  28   GLU  104*  159*   BUN 22*  22*   CREA  0.55  0.91   CA  7.4*  9.6   ALB  2.6*  4.1   TBILI  0.7  0.4   SGOT  10*  19   ALT  13  24       Signed: Ирина Li MD

## 2017-08-28 NOTE — PROGRESS NOTES
GI Progress Note Yesi Oswald)  [address] [facility] OLP:[id number] MLX:[id number]   PCP: Ingrid Aalrcon MD  Date/Time:  8/28/2017 3:39 PM   Assessment:   1. Abnormal CT (fluid filled/distended right colon without evidence of obstruction; I personally reviewed CT with Radiology). Small bowel is not distended. Vessels are patent. 2. Diffuse AP  3. N/v   4. Constipation (possibly opoid induced)  5. Leukocytosis  6. H/o C diff after abx for perirectal abscess     Plan:   · Multiple BM's after enema/Golytely and Methylnaltrexone  · N/v improved  · Leukocytosis improved  · Continued abdominal pain. AP is mainly lower, consider Gyn evaluation  · Trial of Bentyl     Subjective:   Pt w/ continued diffuse AP. Worsened in lower abdomen. Tolerating CLD w/o n/v. Complaint Y/N Description   Abdominal Pain     Hematemesis     Hematochezia     Melena     Constipation     Diarrhea     Dyspepsia     Dysphagia     Jaundiced     Nausea/vomiting       Review of Systems:  Symptom Y/N Comments  Symptom Y/N Comments   Fever/Chills    Chest Pain     Cough    Headaches     Sputum    Joint Pain     SOB/LOFTON    Pruritis/Rash     Tolerating Diet    Other       Could NOT obtain due to:      Objective:   VITALS:   Last 24hrs VS reviewed since prior progress note. Most recent are:  Visit Vitals    /61 (BP 1 Location: Right arm, BP Patient Position: Lying left side)    Pulse 84    Temp 97.3 °F (36.3 °C)    Resp 16    Ht 5' 5\" (1.651 m)    Wt 62.6 kg (138 lb)    SpO2 100%    Breastfeeding No    BMI 22.96 kg/m2       Intake/Output Summary (Last 24 hours) at 08/28/17 1539  Last data filed at 08/28/17 1337   Gross per 24 hour   Intake             2860 ml   Output              200 ml   Net             2660 ml     PHYSICAL EXAM:  General: WD, WN. Alert, cooperative, no acute distress    HEENT: NC, Atraumatic. PERRLA, EOMI. Anicteric sclerae. Lungs:  CTA Bilaterally. No Wheezing/Rhonchi/Rales.   Heart:  Regular  rhythm,  No murmur (), No Rubs, No Gallops  Abdomen: Soft, Non distended, +diffuse TTP, most prominent in lower abdomen.  +Bowel sounds, no HSM  Extremities: No c/c/e  Neurologic:  CN 2-12 gi, Alert and oriented X 3. No acute neurological distress   Psych:   Good insight. Not anxious nor agitated. Lab and Radiology Data Reviewed: (see below)    Medications Reviewed: (see below)  PMH/SH reviewed - no change compared to H&P  ________________________________________________________________________  Care Plan discussed with:  Patient x   Family     RN x              Consultant:       Yeimi Bland MD     Procedures: see electronic medical records for all procedures/Xrays and details which were not copied into this note but were reviewed prior to creation of Plan. LABS:  Recent Labs      08/28/17   0330  08/26/17   2240   WBC  14.4*  20.9*   HGB  10.7*  15.3   HCT  30.8*  42.3   PLT  145*  214     Recent Labs      08/28/17   0330  08/26/17   2240   NA  138  135*   K  3.2*  4.0   CL  104  99   CO2  27  28   BUN  22*  22*   CREA  0.55  0.91   GLU  104*  159*   CA  7.4*  9.6     Recent Labs      08/28/17   0330  08/26/17   2240   SGOT  10*  19   AP  47  72   TP  5.3*  8.1   ALB  2.6*  4.1   GLOB  2.7  4.0   LPSE   --   91     No results for input(s): INR, PTP, APTT in the last 72 hours. No lab exists for component: INREXT   No results for input(s): FE, TIBC, PSAT, FERR in the last 72 hours. No results found for: FOL, RBCF  No results for input(s): PH, PCO2, PO2 in the last 72 hours. No results for input(s): CPK, CKMB in the last 72 hours.     No lab exists for component: TROPONINI  Lab Results   Component Value Date/Time    Color DARK YELLOW 08/27/2017 01:15 AM    Appearance CLEAR 08/27/2017 01:15 AM    Specific gravity 1.020 08/27/2017 01:15 AM    Specific gravity 1.020 05/19/2017 10:32 AM    pH (UA) 5.0 08/27/2017 01:15 AM    Protein NEGATIVE  08/27/2017 01:15 AM    Glucose NEGATIVE  08/27/2017 01:15 AM    Ketone TRACE 08/27/2017 01:15 AM    Bilirubin NEGATIVE  05/20/2017 09:50 AM    Urobilinogen 0.2 08/27/2017 01:15 AM    Nitrites NEGATIVE  08/27/2017 01:15 AM    Leukocyte Esterase TRACE 08/27/2017 01:15 AM    Epithelial cells FEW 08/27/2017 01:15 AM    Bacteria NEGATIVE  08/27/2017 01:15 AM    WBC 0-4 08/27/2017 01:15 AM    RBC 0-5 08/27/2017 01:15 AM       MEDICATIONS:  Current Facility-Administered Medications   Medication Dose Route Frequency    HYDROmorphone (PF) (DILAUDID) injection 1 mg  1 mg IntraVENous Q4H PRN    potassium chloride 10 mEq in 100 ml IVPB  10 mEq IntraVENous Q1H    clonazePAM (KlonoPIN) tablet 0.5 mg  0.5 mg Oral BID PRN    escitalopram oxalate (LEXAPRO) tablet 20 mg  20 mg Oral DAILY    sodium chloride (NS) flush 5-10 mL  5-10 mL IntraVENous Q8H    sodium chloride (NS) flush 5-10 mL  5-10 mL IntraVENous PRN    0.9% sodium chloride infusion  75 mL/hr IntraVENous CONTINUOUS    acetaminophen (TYLENOL) tablet 650 mg  650 mg Oral Q6H PRN    ondansetron (ZOFRAN) injection 4 mg  4 mg IntraVENous Q4H PRN    metroNIDAZOLE (FLAGYL) IVPB premix 500 mg  500 mg IntraVENous Q8H

## 2017-08-29 LAB
ANION GAP SERPL CALC-SCNC: 4 MMOL/L (ref 5–15)
BUN SERPL-MCNC: 8 MG/DL (ref 6–20)
BUN/CREAT SERPL: 17 (ref 12–20)
CALCIUM SERPL-MCNC: 7.6 MG/DL (ref 8.5–10.1)
CHLORIDE SERPL-SCNC: 107 MMOL/L (ref 97–108)
CO2 SERPL-SCNC: 28 MMOL/L (ref 21–32)
CREAT SERPL-MCNC: 0.48 MG/DL (ref 0.55–1.02)
ERYTHROCYTE [DISTWIDTH] IN BLOOD BY AUTOMATED COUNT: 13.5 % (ref 11.5–14.5)
GLUCOSE SERPL-MCNC: 84 MG/DL (ref 65–100)
HCT VFR BLD AUTO: 29.5 % (ref 35–47)
HGB BLD-MCNC: 10.2 G/DL (ref 11.5–16)
MCH RBC QN AUTO: 32.5 PG (ref 26–34)
MCHC RBC AUTO-ENTMCNC: 34.6 G/DL (ref 30–36.5)
MCV RBC AUTO: 93.9 FL (ref 80–99)
PLATELET # BLD AUTO: 132 K/UL (ref 150–400)
POTASSIUM SERPL-SCNC: 3.5 MMOL/L (ref 3.5–5.1)
RBC # BLD AUTO: 3.14 M/UL (ref 3.8–5.2)
SODIUM SERPL-SCNC: 139 MMOL/L (ref 136–145)
WBC # BLD AUTO: 10.9 K/UL (ref 3.6–11)

## 2017-08-29 PROCEDURE — 80048 BASIC METABOLIC PNL TOTAL CA: CPT | Performed by: INTERNAL MEDICINE

## 2017-08-29 PROCEDURE — 74011250636 HC RX REV CODE- 250/636: Performed by: INTERNAL MEDICINE

## 2017-08-29 PROCEDURE — 74011250637 HC RX REV CODE- 250/637: Performed by: INTERNAL MEDICINE

## 2017-08-29 PROCEDURE — 96366 THER/PROPH/DIAG IV INF ADDON: CPT

## 2017-08-29 PROCEDURE — 96361 HYDRATE IV INFUSION ADD-ON: CPT

## 2017-08-29 PROCEDURE — 96376 TX/PRO/DX INJ SAME DRUG ADON: CPT

## 2017-08-29 PROCEDURE — 85027 COMPLETE CBC AUTOMATED: CPT | Performed by: INTERNAL MEDICINE

## 2017-08-29 PROCEDURE — 99218 HC RM OBSERVATION: CPT

## 2017-08-29 PROCEDURE — 36415 COLL VENOUS BLD VENIPUNCTURE: CPT | Performed by: INTERNAL MEDICINE

## 2017-08-29 PROCEDURE — 65270000029 HC RM PRIVATE

## 2017-08-29 PROCEDURE — 74011000250 HC RX REV CODE- 250: Performed by: INTERNAL MEDICINE

## 2017-08-29 RX ORDER — KETOROLAC TROMETHAMINE 30 MG/ML
30 INJECTION, SOLUTION INTRAMUSCULAR; INTRAVENOUS
Status: COMPLETED | OUTPATIENT
Start: 2017-08-29 | End: 2017-08-29

## 2017-08-29 RX ORDER — DICYCLOMINE HYDROCHLORIDE 10 MG/1
10 CAPSULE ORAL 4 TIMES DAILY
Status: DISCONTINUED | OUTPATIENT
Start: 2017-08-29 | End: 2017-08-30 | Stop reason: HOSPADM

## 2017-08-29 RX ADMIN — ESCITALOPRAM OXALATE 20 MG: 10 TABLET ORAL at 10:03

## 2017-08-29 RX ADMIN — KETOROLAC TROMETHAMINE 30 MG: 30 INJECTION, SOLUTION INTRAMUSCULAR at 14:25

## 2017-08-29 RX ADMIN — CLONAZEPAM 0.5 MG: 0.5 TABLET ORAL at 13:29

## 2017-08-29 RX ADMIN — HYDROMORPHONE HYDROCHLORIDE 1 MG: 1 INJECTION, SOLUTION INTRAMUSCULAR; INTRAVENOUS; SUBCUTANEOUS at 18:50

## 2017-08-29 RX ADMIN — DICYCLOMINE HYDROCHLORIDE 10 MG: 10 CAPSULE ORAL at 18:50

## 2017-08-29 RX ADMIN — HYDROMORPHONE HYDROCHLORIDE 1 MG: 1 INJECTION, SOLUTION INTRAMUSCULAR; INTRAVENOUS; SUBCUTANEOUS at 22:30

## 2017-08-29 RX ADMIN — HYDROMORPHONE HYDROCHLORIDE 1 MG: 1 INJECTION, SOLUTION INTRAMUSCULAR; INTRAVENOUS; SUBCUTANEOUS at 15:45

## 2017-08-29 RX ADMIN — METRONIDAZOLE 500 MG: 500 INJECTION, SOLUTION INTRAVENOUS at 15:45

## 2017-08-29 RX ADMIN — HYDROMORPHONE HYDROCHLORIDE 1 MG: 1 INJECTION, SOLUTION INTRAMUSCULAR; INTRAVENOUS; SUBCUTANEOUS at 11:36

## 2017-08-29 RX ADMIN — Medication 10 ML: at 05:41

## 2017-08-29 RX ADMIN — DICYCLOMINE HYDROCHLORIDE 10 MG: 10 CAPSULE ORAL at 22:26

## 2017-08-29 RX ADMIN — SODIUM CHLORIDE 75 ML/HR: 900 INJECTION, SOLUTION INTRAVENOUS at 07:32

## 2017-08-29 RX ADMIN — ACETAMINOPHEN 650 MG: 325 TABLET ORAL at 10:06

## 2017-08-29 RX ADMIN — Medication 10 ML: at 13:29

## 2017-08-29 RX ADMIN — HYDROMORPHONE HYDROCHLORIDE 1 MG: 1 INJECTION, SOLUTION INTRAMUSCULAR; INTRAVENOUS; SUBCUTANEOUS at 07:29

## 2017-08-29 RX ADMIN — DICYCLOMINE HYDROCHLORIDE 10 MG: 10 CAPSULE ORAL at 09:57

## 2017-08-29 RX ADMIN — HYDROMORPHONE HYDROCHLORIDE 1 MG: 1 INJECTION, SOLUTION INTRAMUSCULAR; INTRAVENOUS; SUBCUTANEOUS at 03:03

## 2017-08-29 RX ADMIN — METRONIDAZOLE 500 MG: 500 INJECTION, SOLUTION INTRAVENOUS at 23:15

## 2017-08-29 RX ADMIN — ACETAMINOPHEN 650 MG: 325 TABLET ORAL at 01:48

## 2017-08-29 RX ADMIN — CLONAZEPAM 0.5 MG: 0.5 TABLET ORAL at 01:47

## 2017-08-29 RX ADMIN — DICYCLOMINE HYDROCHLORIDE 10 MG: 10 CAPSULE ORAL at 13:29

## 2017-08-29 RX ADMIN — Medication 10 ML: at 22:27

## 2017-08-29 RX ADMIN — METRONIDAZOLE 500 MG: 500 INJECTION, SOLUTION INTRAVENOUS at 09:59

## 2017-08-29 RX ADMIN — Medication 10 ML: at 18:50

## 2017-08-29 NOTE — PROGRESS NOTES
GI Progress Note Margaret Larsen  NAME:Debi Ford PXQ:0/50/1864 QOC:[id number]   ATTG: [unfilled]  PCP: Andrea Mckee MD  Date/Time:  8/29/2017 9:03 AM   Assessment:   1. Abnormal CT (fluid filled/distended right colon without evidence of obstruction; I personally reviewed CT with Radiology). Small bowel is not distended. Vessels are patent. 2. Diffuse AP  3. N/v   4. Constipation (possibly opoid induced); now resolved  5. Leukocytosis; now normalized  6. H/o C diff after abx for perirectal abscess     Plan:   · Multiple BM's after enema/Golytely and Methylnaltrexone  · N/v improved  · Leukocytosis resolved  · Continued abdominal pain. AP is mainly lower  · Noted pt is s/p LIAM  · Trial of Bentyl  · Pt may need colonoscopy at some point (as an outpatient) but note this is generally low yield to evaluate for AP when pt does not have other symptoms (i.e. Bleeding, diarrhea)     Subjective:   Pt w/ continued abdominal pain. No n/v. Multiple BM's. Complaint Y/N Description   Abdominal Pain     Hematemesis     Hematochezia     Melena     Constipation     Diarrhea     Dyspepsia     Dysphagia     Jaundiced     Nausea/vomiting       Review of Systems:  Symptom Y/N Comments  Symptom Y/N Comments   Fever/Chills    Chest Pain     Cough    Headaches     Sputum    Joint Pain     SOB/LOFTON    Pruritis/Rash     Tolerating Diet    Other       Could NOT obtain due to:      Objective:   VITALS:   Last 24hrs VS reviewed since prior progress note. Most recent are:  Visit Vitals    /56 (BP 1 Location: Right arm)    Pulse 78    Temp 97.3 °F (36.3 °C)    Resp 18    Ht 5' 5\" (1.651 m)    Wt 62.6 kg (138 lb)    SpO2 99%    Breastfeeding No    BMI 22.96 kg/m2       Intake/Output Summary (Last 24 hours) at 08/29/17 0903  Last data filed at 08/29/17 0558   Gross per 24 hour   Intake           1402.5 ml   Output             1100 ml   Net            302.5 ml     PHYSICAL EXAM:  General:                    WD, WN.  Alert, cooperative, no acute distress    HEENT:                     NC, Atraumatic. PERRLA, EOMI. Anicteric sclerae. Lungs:                       CTA Bilaterally. No Wheezing/Rhonchi/Rales. Heart:                        Regular  rhythm,  No murmur (), No Rubs, No Gallops  Abdomen:                  Soft, Non distended, +diffuse TTP, most prominent in lower abdomen.  +Bowel sounds, no HSM  Extremities:               No c/c/e  Neurologic:                CN 2-12 gi, Alert and oriented X 3. No acute neurological distress   Psych:                       Good insight. Not anxious nor agitated. Lab and Radiology Data Reviewed: (see below)    Medications Reviewed: (see below)  PMH/SH reviewed - no change compared to H&P  ________________________________________________________________________  Care Plan discussed with:  Patient x   Family     RN               Consultant:       Maddy Mchugh MD     Procedures: see electronic medical records for all procedures/Xrays and details which were not copied into this note but were reviewed prior to creation of Plan. LABS:  Recent Labs      08/29/17 0156 08/28/17   0330   WBC  10.9  14.4*   HGB  10.2*  10.7*   HCT  29.5*  30.8*   PLT  132*  145*     Recent Labs      08/29/17   0156 08/28/17   0330  08/26/17   2240   NA  139  138  135*   K  3.5  3.2*  4.0   CL  107  104  99   CO2  28  27  28   BUN  8  22*  22*   CREA  0.48*  0.55  0.91   GLU  84  104*  159*   CA  7.6*  7.4*  9.6     Recent Labs      08/28/17   0330  08/26/17   2240   SGOT  10*  19   AP  47  72   TP  5.3*  8.1   ALB  2.6*  4.1   GLOB  2.7  4.0   LPSE   --   91     No results for input(s): INR, PTP, APTT in the last 72 hours. No lab exists for component: INREXT   No results for input(s): FE, TIBC, PSAT, FERR in the last 72 hours. No results found for: FOL, RBCF  No results for input(s): PH, PCO2, PO2 in the last 72 hours. No results for input(s): CPK, CKMB in the last 72 hours.     No lab exists for component: TROPONINI  Lab Results   Component Value Date/Time    Color DARK YELLOW 08/27/2017 01:15 AM    Appearance CLEAR 08/27/2017 01:15 AM    Specific gravity 1.020 08/27/2017 01:15 AM    Specific gravity 1.020 05/19/2017 10:32 AM    pH (UA) 5.0 08/27/2017 01:15 AM    Protein NEGATIVE  08/27/2017 01:15 AM    Glucose NEGATIVE  08/27/2017 01:15 AM    Ketone TRACE 08/27/2017 01:15 AM    Bilirubin NEGATIVE  05/20/2017 09:50 AM    Urobilinogen 0.2 08/27/2017 01:15 AM    Nitrites NEGATIVE  08/27/2017 01:15 AM    Leukocyte Esterase TRACE 08/27/2017 01:15 AM    Epithelial cells FEW 08/27/2017 01:15 AM    Bacteria NEGATIVE  08/27/2017 01:15 AM    WBC 0-4 08/27/2017 01:15 AM    RBC 0-5 08/27/2017 01:15 AM       MEDICATIONS:  Current Facility-Administered Medications   Medication Dose Route Frequency    dicyclomine (BENTYL) capsule 10 mg  10 mg Oral QID    HYDROmorphone (PF) (DILAUDID) injection 1 mg  1 mg IntraVENous Q4H PRN    clonazePAM (KlonoPIN) tablet 0.5 mg  0.5 mg Oral BID PRN    escitalopram oxalate (LEXAPRO) tablet 20 mg  20 mg Oral DAILY    sodium chloride (NS) flush 5-10 mL  5-10 mL IntraVENous Q8H    sodium chloride (NS) flush 5-10 mL  5-10 mL IntraVENous PRN    0.9% sodium chloride infusion  75 mL/hr IntraVENous CONTINUOUS    acetaminophen (TYLENOL) tablet 650 mg  650 mg Oral Q6H PRN    ondansetron (ZOFRAN) injection 4 mg  4 mg IntraVENous Q4H PRN    metroNIDAZOLE (FLAGYL) IVPB premix 500 mg  500 mg IntraVENous Q8H

## 2017-08-29 NOTE — PROGRESS NOTES
End of Shift Nursing Note    Bedside shift change report given to Mik RN (oncoming nurse) . Report included the following information SBAR, Kardex, MAR and Recent Results. Zone Phone: [phone]    Significant changes during shift:    none   Non-emergent issues for physician to address:   Still complaining of sudden, severe pain when aware of next available pain medication is due. Number times ambulated in hallway past shift: 0      Number of times OOB to chair past shift: up ad jaspal    POD #: N/A     Vital Signs:    Temp: 97.3 °F (36.3 °C)     Pulse (Heart Rate): 78     BP: 103/56     Resp Rate: 18     O2 Sat (%): 99 %    Lines & Drains:     Urinary Catheter? No   Placement Date:    Medical Necessity:  Central Line? No   Placement Date:    Medical Necessity:   PICC Line? No   Placement Date:    Medical Necessity:     NG tube [] in [] removed [x] not applicable   Drains [] in [] removed [] not applicable     Skin Integrity:      Wounds: no         GI:    Current diet:  DIET FULL LIQUID  DIET ONE TIME MESSAGE  DIET ONE TIME MESSAGE    Nausea: NO  Vomiting: NO  Bowel Sounds: YES  Flatus: YES  Last Bowel Movement: yesterday   Appearance:     Respiratory:  Supplemental O2: No      Device:    via  Liters/min     Incentive Spirometer: NO  Volume:   Coughing and Deep Breathing: YES  Oral Care: YES  Understanding (patient/family education): YES   Getting out of bed: YES  Head of bed elevation: YES    Patient Safety:    Falls Score: 1  Mobility Score: 5  Bed Alarm On? No  Sitter? No      Opportunity for questions and clarification was given to oncoming nurse. Patient bed is in low position, side rails are up x 2, door & observation blinds open as needed, call bell within reach and patient not in distress.     Oliver Phan, RN

## 2017-08-29 NOTE — PROGRESS NOTES
End of Shift Nursing Note    Bedside shift change report given to JAMES Allred (oncoming nurse) by [address] [facility] (offgoing nurse). Report included the following information SBAR and Kardex. Zone Phone:   [phone]    Significant changes during shift:    NONE   Non-emergent issues for physician to address:   NONE     Number times ambulated in hallway past shift: 3      Number of times OOB to chair past shift: 3    POD #: NONE     Vital Signs:    Temp: 98 °F (36.7 °C)     Pulse (Heart Rate): 71     BP: 109/89     Resp Rate: 18     O2 Sat (%): 100 %    Lines & Drains:     Urinary Catheter? No     Central Line? No       PICC Line? No       NG tube [] in [] removed [] not applicable   Drains [] in [] removed [] not applicable     Skin Integrity:      Wounds: no   Dressings Present: no    Wound Concerns: no      GI:    Current diet:  DIET ONE TIME MESSAGE  DIET ONE TIME MESSAGE  DIET GI LITE (POST SURGICAL)    Nausea: NO  Vomiting: NO  Bowel Sounds: YES  Flatus: YES  Last Bowel Movement: yesterday   Appearance:     Respiratory:  Supplemental O2: No      Device:    via  Liters/min     Incentive Spirometer: YES  Volume:   Coughing and Deep Breathing: YES  Oral Care: YES  Understanding (patient/family education): YES   Getting out of bed: YES  Head of bed elevation: YES    Patient Safety:    Falls Score: 2  Mobility Score: 1  Bed Alarm On? No  Sitter? No      Opportunity for questions and clarification was given to oncoming nurse. Patient bed is in low position, side rails are up x 2, door & observation blinds open as needed, call bell within reach and patient not in distress.     Jatinder Louis RN

## 2017-08-29 NOTE — PROGRESS NOTES
Spiritual Care Partner Volunteer visited patient on 8/29/17. Documented by:    Alex Tucker M.S.   Spiritual Care Department  If needs arise please call DEENA ([phone])

## 2017-08-29 NOTE — PROGRESS NOTES
Hospitalist Progress Note    NAME: Severiano File   :  1980   MRN:  [id number]       Interim Hospital Summary: 40 y.o. female whom presented on 2017 with      Assessment / Plan:  Abdominal pain with nausea, vomiting likely due to constipation  -CT A/P with \"Abnormal, but nonspecific appearance of the ascending and transverse colon which is fluid-filled, becoming progressively more normal distally. This finding is nonspecific. \"  -had large BMs yesterday, pain continues  -advance to full liquids cont to advance per gi   -continue pain control, need to minimized pain meds  -will not increase pain meds  - will ask gyn to see pt as pain localized lower pelvic region,no dc/no bleeding/ ua clear     Leukocytosis improved -no clear infectious source, consider stress reaction  -empiric IV flagyl with leukocytosis, abnormal CT, and reported hx of C.diff following   Will await gi and gyn may dc abx      ADHD/Anxiety/depression  -on vyvanse at home.  -continue pta lexapro, prn klonipin      Tobacco use  - cessation  -refuses nicotine patch      Code Status: full  Surrogate Decision Maker: Shoshana lambert [phone]  DVT Prophylaxis: scds  GI Prophylaxis: not indicated  Baseline: independent     Body mass index is 22.96 kg/(m^2). Subjective:     Chief Complaint / Reason for Physician Visit Continued with  Lower ap  No n/v. No fc.having bms   Discussed with RN events overnight. Review of Systems:  Symptom Y/N Comments  Symptom Y/N Comments   Fever/Chills    Chest Pain     Poor Appetite    Edema     Cough    Abdominal Pain     Sputum    Joint Pain     SOB/LOFTON    Pruritis/Rash     Nausea/vomit    Tolerating PT/OT     Diarrhea    Tolerating Diet     Constipation    Other       Could NOT obtain due to:      Objective:     VITALS:   Last 24hrs VS reviewed since prior progress note.  Most recent are:  Patient Vitals for the past 24 hrs:   Temp Pulse Resp BP SpO2   17 0305 97.3 °F (36.3 °C) 78 18 103/56 99 %   08/28/17 2305 97.9 °F (36.6 °C) 76 18 109/55 100 %   08/28/17 1949 98.6 °F (37 °C) 84 16 98/52 100 %   08/28/17 1634 98.1 °F (36.7 °C) 82 16 97/47 99 %   08/28/17 1433 97.3 °F (36.3 °C) 84 16 105/61 100 %   08/28/17 1122 98.5 °F (36.9 °C) 87 16 106/61 98 %   08/28/17 0801 98.6 °F (37 °C) 85 16 95/61 100 %       Intake/Output Summary (Last 24 hours) at 08/29/17 0727  Last data filed at 08/29/17 0305   Gross per 24 hour   Intake           1502.5 ml   Output              700 ml   Net            802.5 ml        PHYSICAL EXAM:  General: WD, WN. Alert, cooperative, no acute distress    EENT:  EOMI. Anicteric sclerae. MMM  Resp:  CTA bilaterally, no wheezing or rales. No accessory muscle use  CV:  Regular  rhythm,  No edema  GI:  Soft, Non distended,  Tender lower pelvic region worse in left.  +Bowel sounds  Neurologic:  Alert and oriented X 3, normal speech,   Psych:   Good insight. Not anxious nor agitated  Skin:  No rashes. No jaundice    Reviewed most current lab test results and cultures  YES  Reviewed most current radiology test results   YES  Review and summation of old records today    NO  Reviewed patient's current orders and MAR    YES  PMH/ reviewed - no change compared to H&P  ________________________________________________________________________  Care Plan discussed with:    Comments   Patient x    Family      RN x    Care Manager     Consultant                        Multidiciplinary team rounds were held today with , nursing, pharmacist and clinical coordinator. Patient's plan of care was discussed; medications were reviewed and discharge planning was addressed.      ________________________________________________________________________  Total NON critical care TIME:  30   Minutes    Total CRITICAL CARE TIME Spent:   Minutes non procedure based      Comments   >50% of visit spent in counseling and coordination of care x ________________________________________________________________________  Get Shelton MD     Procedures: see electronic medical records for all procedures/Xrays and details which were not copied into this note but were reviewed prior to creation of Plan. LABS:  I reviewed today's most current labs and imaging studies.   Pertinent labs include:  Recent Labs      08/29/17 0156 08/28/17 0330 08/26/17   2240   WBC  10.9  14.4*  20.9*   HGB  10.2*  10.7*  15.3   HCT  29.5*  30.8*  42.3   PLT  132*  145*  214     Recent Labs      08/29/17 0156 08/28/17   0330 08/26/17   2240   NA  139  138  135*   K  3.5  3.2*  4.0   CL  107  104  99   CO2  28  27  28   GLU  84  104*  159*   BUN  8  22*  22*   CREA  0.48*  0.55  0.91   CA  7.6*  7.4*  9.6   ALB   --   2.6*  4.1   TBILI   --   0.7  0.4   SGOT   --   10*  19   ALT   --   13  24       Signed: Get Shelton MD

## 2017-08-30 VITALS
HEIGHT: 65 IN | OXYGEN SATURATION: 98 % | TEMPERATURE: 97.7 F | BODY MASS INDEX: 22.99 KG/M2 | SYSTOLIC BLOOD PRESSURE: 115 MMHG | WEIGHT: 138 LBS | DIASTOLIC BLOOD PRESSURE: 67 MMHG | RESPIRATION RATE: 16 BRPM | HEART RATE: 67 BPM

## 2017-08-30 PROCEDURE — 74011250636 HC RX REV CODE- 250/636: Performed by: INTERNAL MEDICINE

## 2017-08-30 PROCEDURE — 96361 HYDRATE IV INFUSION ADD-ON: CPT

## 2017-08-30 PROCEDURE — 74011250637 HC RX REV CODE- 250/637: Performed by: INTERNAL MEDICINE

## 2017-08-30 PROCEDURE — 74011000250 HC RX REV CODE- 250: Performed by: INTERNAL MEDICINE

## 2017-08-30 PROCEDURE — 96376 TX/PRO/DX INJ SAME DRUG ADON: CPT

## 2017-08-30 PROCEDURE — 96366 THER/PROPH/DIAG IV INF ADDON: CPT

## 2017-08-30 PROCEDURE — 99218 HC RM OBSERVATION: CPT

## 2017-08-30 RX ORDER — METRONIDAZOLE 500 MG/1
500 TABLET ORAL 3 TIMES DAILY
Qty: 9 TAB | Refills: 0 | Status: SHIPPED | OUTPATIENT
Start: 2017-08-30 | End: 2017-09-02

## 2017-08-30 RX ORDER — DICYCLOMINE HYDROCHLORIDE 10 MG/1
10 CAPSULE ORAL 4 TIMES DAILY
Qty: 20 CAP | Refills: 0 | Status: SHIPPED | OUTPATIENT
Start: 2017-08-30 | End: 2020-08-27

## 2017-08-30 RX ADMIN — SODIUM CHLORIDE 75 ML/HR: 900 INJECTION, SOLUTION INTRAVENOUS at 02:38

## 2017-08-30 RX ADMIN — HYDROMORPHONE HYDROCHLORIDE 1 MG: 1 INJECTION, SOLUTION INTRAMUSCULAR; INTRAVENOUS; SUBCUTANEOUS at 02:37

## 2017-08-30 RX ADMIN — ESCITALOPRAM OXALATE 20 MG: 10 TABLET ORAL at 08:49

## 2017-08-30 RX ADMIN — HYDROMORPHONE HYDROCHLORIDE 1 MG: 1 INJECTION, SOLUTION INTRAMUSCULAR; INTRAVENOUS; SUBCUTANEOUS at 06:20

## 2017-08-30 RX ADMIN — METRONIDAZOLE 500 MG: 500 INJECTION, SOLUTION INTRAVENOUS at 08:50

## 2017-08-30 RX ADMIN — CLONAZEPAM 0.5 MG: 0.5 TABLET ORAL at 00:10

## 2017-08-30 RX ADMIN — ACETAMINOPHEN 650 MG: 325 TABLET ORAL at 08:50

## 2017-08-30 RX ADMIN — HYDROMORPHONE HYDROCHLORIDE 1 MG: 1 INJECTION, SOLUTION INTRAMUSCULAR; INTRAVENOUS; SUBCUTANEOUS at 10:46

## 2017-08-30 RX ADMIN — Medication 10 ML: at 05:30

## 2017-08-30 RX ADMIN — DICYCLOMINE HYDROCHLORIDE 10 MG: 10 CAPSULE ORAL at 08:50

## 2017-08-30 RX ADMIN — DICYCLOMINE HYDROCHLORIDE 10 MG: 10 CAPSULE ORAL at 13:52

## 2017-08-30 RX ADMIN — ACETAMINOPHEN 650 MG: 325 TABLET ORAL at 00:10

## 2017-08-30 NOTE — PROGRESS NOTES
Spiritual Care Assessment/Progress Notes    Leyda Busby [id number]  [id number]    1980  40 y.o.  female    Patient Telephone Number: [phone] (home)   Confucianism Affiliation: No Confucianism   Language: English   Extended Emergency Contact Information  Primary Emergency Contact: [address] Phone: [phone]  Mobile Phone: [phone]  Relation: Father  Secondary Emergency Contact: Javier De Oliveira  Mobile Phone: [phone]  Relation: Sister   Patient Active Problem List    Diagnosis Date Noted    Abdominal pain 08/27/2017    Constipation 08/27/2017    Intractable nausea and vomiting 08/27/2017    Perirectal abscess 05/22/2017    Adjustment disorder with mixed anxiety and depressed mood 03/25/2016    ADHD (attention deficit hyperactivity disorder), combined type 03/25/2016    Female pelvic hematoma 06/28/2015    Post-operative hemorrhage 06/28/2015    Bilateral edema of lower extremity 06/01/2015    Smoker 05/25/2015    Generalized anxiety disorder 05/19/2015        Date: 8/30/2017       Level of Confucianism/Spiritual Activity:  []         Involved in dori tradition/spiritual practice    []         Not involved in dori tradition/spiritual practice  [x]         Spiritually oriented    []         Claims no spiritual orientation    []         seeking spiritual identity  []         Feels alienated from Faith practice/tradition  []         Feels angry about Faith practice/tradition  []         Spirituality/Faith tradition  a resource for coping at this time.   []         Not able to assess due to medical condition    Services Provided Today:  []         crisis intervention    []         reading Scriptures  []         spiritual assessment    []         prayer  [x]         empathic listening/emotional support  []         rites and rituals (cite in comments)  []         life review     []         Faith support  []         theological development   []         advocacy  []         ethical dialog     []         blessing  []         bereavement support    []         support to family  []         anticipatory grief support   []         help with AMD  []         spiritual guidance    []         meditation      Spiritual Care Needs  []         Emotional Support  []         Spiritual/Nondenominational Care  []         Loss/Adjustment  []         Advocacy/Referral                /Ethics  [x]         No needs expressed at               this time  []         Other: (note in               comments)  [address]  []         Follow up visits with               pt/family  []         Provide materials  []         Schedule sacraments  []         Contact Community               Clergy  [x]         Follow up as needed  []         Other: (note in               comments)     Comments:  provided support through supportive listening as pt shared that she was excited to be going home. However, she expressed her abdominal pain saying that it was still hurting. Pt seemed weak but she insisted that she was ok. Advised of availability and assured her of continued support as needed/ desired. Antione Dawson M.S.   Spiritual Care Department  If needs arise please call DEENA ([phone])

## 2017-08-30 NOTE — CONSULTS
I received this consult to [facility]. Brief chart review suggests this consult should be routed to general OB/GYN, if needed. This patient has no gyn organs, is s/p total hysterectomy, bilateral salpingo-oophoectomy in 6/2015 by Dr. Arabella Rincon. Benign pathology, adenomyosis, normal ovaries, hydrasalpinx, without evidence of malignancy or infection. CT scan shows no abnormalities in the pelvis. I do not see that gyn etiology would contribute to current reason for admission. I did not physically evaluate the patient.      Sandra Esposito MD

## 2017-08-30 NOTE — PROGRESS NOTES
GI Progress Note Shweta Solis  NAME:Debi Longoria EZR:[birth year] LLV:[id number]   ATTG: [unfilled]  PCP: Mirian Delgado MD  Date/Time:  8/30/2017 12:47 PM   Assessment:   1. Abnormal CT (fluid filled/distended right colon without evidence of obstruction; I personally reviewed CT with Radiology). Small bowel is not distended. Vessels are patent. 2. Diffuse AP  3. N/v   4. Constipation (possibly opoid induced); now resolved  5. Leukocytosis; now normalized  6. H/o C diff after abx for perirectal abscess     Plan:   · Pt feels ready to go home today; ok to be discharged from a GI perspective  · Trial of Bentyl  · Scheduled for outpatient EGD on Friday; pt knows to return for this. · Pt may need colonoscopy at some point (as an outpatient) but note this is generally low yield to evaluate for AP when pt does not have other symptoms (i.e. Bleeding, diarrhea)     Subjective:   Pt feels ready to go home today. Still with crampy AP but overall improved. Complaint Y/N Description   Abdominal Pain     Hematemesis     Hematochezia     Melena     Constipation     Diarrhea     Dyspepsia     Dysphagia     Jaundiced     Nausea/vomiting       Review of Systems:  Symptom Y/N Comments  Symptom Y/N Comments   Fever/Chills    Chest Pain     Cough    Headaches     Sputum    Joint Pain     SOB/LOFTON    Pruritis/Rash     Tolerating Diet    Other       Could NOT obtain due to:      Objective:   VITALS:   Last 24hrs VS reviewed since prior progress note. Most recent are:  Visit Vitals    /67 (BP 1 Location: Left arm, BP Patient Position: At rest)    Pulse 67    Temp 97.7 °F (36.5 °C)    Resp 16    Ht 5' 5\" (1.651 m)    Wt 62.6 kg (138 lb)    SpO2 98%    Breastfeeding No    BMI 22.96 kg/m2       Intake/Output Summary (Last 24 hours) at 08/30/17 1247  Last data filed at 08/30/17 0846   Gross per 24 hour   Intake          3651.25 ml   Output                0 ml   Net          3651.25 ml     PHYSICAL EXAM:  General: WD, WN. Alert, cooperative, no acute distress    HEENT: NC, Atraumatic. PERRLA, EOMI. Anicteric sclerae. Lungs:  CTA Bilaterally. No Wheezing/Rhonchi/Rales. Heart:  Regular  rhythm,  No murmur (), No Rubs, No Gallops  Abdomen: Soft, Non distended, diffuse TTP w/o peritoneal signs.  +Bowel sounds, no HSM  Extremities: No c/c/e  Neurologic:  CN 2-12 gi, Alert and oriented X 3. No acute neurological distress   Psych:   Good insight. Not anxious nor agitated. Lab and Radiology Data Reviewed: (see below)    Medications Reviewed: (see below)  PMH/SH reviewed - no change compared to H&P  ________________________________________________________________________  Care Plan discussed with:  Patient x   Family     RN               Consultant:       Simona Cerrato MD     Procedures: see electronic medical records for all procedures/Xrays and details which were not copied into this note but were reviewed prior to creation of Plan. LABS:  Recent Labs      08/29/17 0156 08/28/17   0330   WBC  10.9  14.4*   HGB  10.2*  10.7*   HCT  29.5*  30.8*   PLT  132*  145*     Recent Labs      08/29/17 0156 08/28/17   0330   NA  139  138   K  3.5  3.2*   CL  107  104   CO2  28  27   BUN  8  22*   CREA  0.48*  0.55   GLU  84  104*   CA  7.6*  7.4*     Recent Labs      08/28/17   0330   SGOT  10*   AP  47   TP  5.3*   ALB  2.6*   GLOB  2.7     No results for input(s): INR, PTP, APTT in the last 72 hours. No lab exists for component: INREXT   No results for input(s): FE, TIBC, PSAT, FERR in the last 72 hours. No results found for: FOL, RBCF  No results for input(s): PH, PCO2, PO2 in the last 72 hours. No results for input(s): CPK, CKMB in the last 72 hours.     No lab exists for component: TROPONINI  Lab Results   Component Value Date/Time    Color DARK YELLOW 08/27/2017 01:15 AM    Appearance CLEAR 08/27/2017 01:15 AM    Specific gravity 1.020 08/27/2017 01:15 AM    Specific gravity 1.020 05/19/2017 10:32 AM    pH (UA) 5.0 08/27/2017 01:15 AM    Protein NEGATIVE  08/27/2017 01:15 AM    Glucose NEGATIVE  08/27/2017 01:15 AM    Ketone TRACE 08/27/2017 01:15 AM    Bilirubin NEGATIVE  05/20/2017 09:50 AM    Urobilinogen 0.2 08/27/2017 01:15 AM    Nitrites NEGATIVE  08/27/2017 01:15 AM    Leukocyte Esterase TRACE 08/27/2017 01:15 AM    Epithelial cells FEW 08/27/2017 01:15 AM    Bacteria NEGATIVE  08/27/2017 01:15 AM    WBC 0-4 08/27/2017 01:15 AM    RBC 0-5 08/27/2017 01:15 AM       MEDICATIONS:  Current Facility-Administered Medications   Medication Dose Route Frequency    dicyclomine (BENTYL) capsule 10 mg  10 mg Oral QID    HYDROmorphone (PF) (DILAUDID) injection 1 mg  1 mg IntraVENous Q4H PRN    clonazePAM (KlonoPIN) tablet 0.5 mg  0.5 mg Oral BID PRN    escitalopram oxalate (LEXAPRO) tablet 20 mg  20 mg Oral DAILY    sodium chloride (NS) flush 5-10 mL  5-10 mL IntraVENous Q8H    sodium chloride (NS) flush 5-10 mL  5-10 mL IntraVENous PRN    0.9% sodium chloride infusion  75 mL/hr IntraVENous CONTINUOUS    acetaminophen (TYLENOL) tablet 650 mg  650 mg Oral Q6H PRN    ondansetron (ZOFRAN) injection 4 mg  4 mg IntraVENous Q4H PRN    metroNIDAZOLE (FLAGYL) IVPB premix 500 mg  500 mg IntraVENous Q8H

## 2017-08-30 NOTE — PROGRESS NOTES
End of Shift Nursing Note    Bedside shift change report given to [facility] (oncoming nurse) . Report included the following information SBAR, Kardex and MAR. Zone Phone:   [phone]    Significant changes during shift:    Lesser pain? though still asking for pain med every 4hr   Non-emergent issues for physician to address:        Number times ambulated in hallway past shift:       Number of times OOB to chair past shift: up ad jaspal    POD #: N/A     Vital Signs:    Temp: 98 °F (36.7 °C)     Pulse (Heart Rate): 72     BP: 121/73     Resp Rate: 16     O2 Sat (%): 99 %    Lines & Drains:     Urinary Catheter? No   Placement Date:    Medical Necessity:   Central Line? No   Placement Date:    Medical Necessity:   PICC Line? No   Placement Date:    Medical Necessity:     NG tube [] in [] removed [x] not applicable   Drains [] in [] removed [x] not applicable     Skin Integrity:      Wounds: no     GI:    Current diet:  DIET ONE TIME MESSAGE  DIET ONE TIME MESSAGE  DIET GI LITE (POST SURGICAL)    Nausea: NO  Vomiting: NO  Bowel Sounds: YES  Flatus: YES  Last Bowel Movement: yesterday   Appearance:     Respiratory:  Supplemental O2: No      Device:    via  Liters/min     Incentive Spirometer: NO  Volume:   Coughing and Deep Breathing: NO  Oral Care: YES  Understanding (patient/family education): YES   Getting out of bed: YES  Head of bed elevation: YES    Patient Safety:    Falls Score: 1  Mobility Score: 5  Bed Alarm On? No  Sitter? No      Opportunity for questions and clarification was given to oncoming nurse. Patient bed is in low position, side rails are up x 2, door & observation blinds open as needed, call bell within reach and patient not in distress.     Simin Bonner RN

## 2017-08-30 NOTE — PROGRESS NOTES
I have discussed and reviewed discharge instructions with the patient. The patient verbalized understanding. Pt discharged with belongings, instructions, home medications, and 2 prescriptions. One peripheral IV has been removed. Two follow up appointments have been scheduled and pt is aware. No additional questions at this time.

## 2017-08-30 NOTE — DISCHARGE SUMMARY
Hospitalist Discharge Summary     Patient ID:  Ryan Sweet  [id number]  59 y.o.  1980    PCP on record: Odessa Rubio MD    Admit date: 8/26/2017  Discharge date and time: 8/30/2017      DISCHARGE DIAGNOSIS:  Abdominal pain with nausea, vomiting likely due to constipation resolved  Leukocytosis resolved    ADHD/Anxiety/depression    Tobacco use     Body mass index is 22.96 kg/(m^2). CONSULTATIONS:  IP CONSULT TO GASTROENTEROLOGY  IP CONSULT TO GYNECOLOGICAL ONCOLOGY    Excerpted HPI from H&P of Brian Alonso MD:  Gerda Mercado is a 40 y.o.  female with pmh of anxiety/depression/adhd who presents with complaint of abdominal pain and associated N/V and constipation. She is a poor historian as she just received IV dilaudid in the ED and remains somnolent. Unable to quantify how long she has had abdominal pain but per chart review she was seen in the ED on 8/22/17 with RUQ pain. Currently points to the epigastric region as being most painful. Says she has not had a BM in ~1 week. Reports trying an otc laxative without success. Still passing gas. Vomiting started last night and she decided she needed to present to the ED. Reports a history of hysterectomy years ago and I&D of perirectal abscess May of this year. On her last ED visit she had a RUQ US which showed a partly distended gallbladder without gallstones or biliary duct dilatation, she also had a normal HIDA scan, and was sent home with oxycodone and zofran. In ED patient is afebrile with stable vitals. CT AP shows \"Abnormal, but nonspecific appearance of the ascending and transverse colon which is fluid-filled, becoming progressively more normal distally. This finding is nonspecific. \"  On lab work her WBC is 20 with no evidence of infection. She does report history of C diff following antibiotics for perirectal abscess in May. Lipase and LFTs are wnl.  She has received IV dilaudid and zofran in the ED with poor control of her symptoms. We were asked to admit for work up and evaluation of the above problems. ______________________________________________________________________  DISCHARGE SUMMARY/HOSPITAL COURSE:  for full details see H&P, daily progress notes, labs, consult notes. Abdominal pain with nausea, vomiting likely due to constipation resolved  -CT A/P with \"Abnormal, but nonspecific appearance of the ascending and transverse colon which is fluid-filled, becoming progressively more normal distally. This finding is nonspecific. \"  -had large BMs yesterday, pain continues  -advance to full liquids  -continue pain control, received dose of methylnaltrexone yesterday  -also concern for ?pain seeking, patient continues to ask for increase in pain med dose  - will continue with Miralax prn will get OTC  Per GI \"  ·  Multiple BM's after enema/Golytely and Methylnaltrexone  · N/v improved  · Leukocytosis resolved  · Continued abdominal pain. AP is mainly lower  · Noted pt is s/p LIAM  · Trial of Bentyl  Pt may need colonoscopy at some point (as an outpatient) but note this is generally low yield to evaluate for AP when pt does not have other symptoms (i.e. Bleeding, diarrhea)\"    Per GYN  received this consult to [address] Barby Ziegler. Brief chart review suggests this consult should be routed to general OB/GYN, if needed. This patient has no gyn organs, is s/p total hysterectomy, bilateral salpingo-oophoectomy in 6/2015 by Dr. Issa Rodriguez. Benign pathology, adenomyosis, normal ovaries, hydrasalpinx, without evidence of malignancy or infection. CT scan shows no abnormalities in the pelvis. I do not see that gyn etiology would contribute to current reason for admission.  I did not physically evaluate the patient.      Kei Vogel MD  Leukocytosis resolved  -improving, no clear infectious source, consider stress reaction  -Start empiric IV flagyl with leukocytosis, abnormal CT, and reported hx of C.diff following antibiotics for her perirectal abscess in May. - will be treated for 7 days      ADHD/Anxiety/depression  -on vyvanse at home.  -continue pta lexapro, prn klonipin      ANEMIA-  Will need work up as outpatient by pcp and GI  Pt aware-       Tobacco use  -patient says she uses a vape  - cessation  -refuses nicotine patch         Body mass index is 22.96 kg/(m^2). _______________________________________________________________________  Patient seen and examined by me on discharge day. Pertinent Findings:  Gen:    Not in distress  Chest: Clear lungs  CVS:   Regular rhythm. No edema  Abd:  Soft, not distended, not tender  Neuro:  Alert,   _______________________________________________________________________  DISCHARGE MEDICATIONS:   Current Discharge Medication List      START taking these medications    Details   dicyclomine (BENTYL) 10 mg capsule Take 1 Cap by mouth four (4) times daily. Qty: 20 Cap, Refills: 0      metroNIDAZOLE (FLAGYL) 500 mg tablet Take 1 Tab by mouth three (3) times daily for 3 days. Qty: 9 Tab, Refills: 0         CONTINUE these medications which have NOT CHANGED    Details   ondansetron (ZOFRAN ODT) 4 mg disintegrating tablet Take 1 Tab by mouth every eight (8) hours as needed for Nausea. Qty: 10 Tab, Refills: 0      clonazePAM (KLONOPIN) 0.5 mg tablet Take 0.5 mg by mouth two (2) times a day. Lisdexamfetamine (VYVANSE) 50 mg cap Take by mouth daily. escitalopram oxalate (LEXAPRO) 20 mg tablet Take 20 mg by mouth daily. famotidine (PEPCID) 20 mg tablet Take 1 Tab by mouth two (2) times a day.   Qty: 20 Tab, Refills: 0         STOP taking these medications       dicyclomine (BENTYL) 20 mg tablet Comments:   Reason for Stopping:         ALPRAZolam (XANAX) 1 mg tablet Comments:   Reason for Stopping:         oxyCODONE IR (ROXICODONE) 5 mg immediate release tablet Comments:   Reason for Stopping:               My Recommended Diet, Activity, Wound Care, and follow-up labs are listed in the patient's Discharge Insturctions which I have personally completed and reviewed.     _______________________________________________________________________  DISPOSITION:    Home with Family: x   Home with HH/PT/OT/RN:    SNF/LTC:    [place]:    OTHER:        Condition at Discharge:  Stable  _______________________________________________________________________  Follow up with:   PCP : Andrea Mckee MD  Follow-up Information     Follow up With Details Comments Contact Info    Ahmet Montez MD On 9/1/2017 9:30 ; This is your scheduled procedure for outpatient EGD  [address]      Andrea Mckee MD In 1 week  [address] [facility]  [phone]                Total time in minutes spent coordinating this discharge (includes going over instructions, follow-up, prescriptions, and preparing report for sign off to her PCP) :  30 minutes    Signed:  Reza Mathias MD

## 2017-09-01 LAB
BACTERIA SPEC CULT: NORMAL
SERVICE CMNT-IMP: NORMAL

## 2017-10-06 NOTE — PHYSICIAN ADVISORY
Dear Dr. Gaming Gist :     Deya Howard has denied the inpatient status of one of your patients. Now we need you to complete a peer to peer review with [facility] 2 doctor and justify your clinical decision. Only you are allowed to do this peer to peer review. You will need to do the following:    Within next Three days    Please call [employer] at  [phone], then follow the prompt for medical management and then choose peer to peer review.  Talk to their staff and setup a peer to peer review with their doctor at a mutually convenient time    Prepare your argument to defend your decision on the basis of criteria I have provided for you - see below - next to [facility] (criteria)    Complete review with him/her    Send an email reply to me with   1. Name of doctor you did the peer to peer   2. Outcome (approved inpatient or denied)   3. Date you did the review   If you need help, you are more than welcome to talk to me anytime, call me on my cell number listed below     Policy Number :  [id number]   Reference/Auth Number Auth number: [id number]     Pt Name:  Liliam Dockery   MR#  [id number]   [facility]#   [id number]   Admit date  8/26/2017 10:16 PM   Discharge date  8/30/2017   Discharging doctor  Dean Reynoso   Admitting doctor Louisa Myers   Principal diagnosis  <principal problem not specified>        Insurance  Payor: Bora Garcia / Plan: Ronny Nab / Product Type: HMO /      Clinicals    40 y.o. y.o. female who was hospitalized for significant abdominal pain. She was noted to have colonic abnormality from CT. She was treated conservatively and with symptomatic Rx with IV Narcotics as depicted below.    HYDROmorphone (PF) (DILAUDID) injection 0.5 mg   [[id number]]  Order Details     Ordered Dose: 0.5 mg Route: IntraVENous Frequency: EVERY 4 HOURS AS NEEDED for Severe Pain     Mon Aug 28, 2017 0959 Waste Rashawn, Ads Dispense [facility] 2 ADS     Mon Aug 28, 2017 6261 Dispense Rashawn, Ads Dispense [facility] 2 ADS     Mon Aug 28, 2017 0220 Waste Rashawn, Ads Dispense [facility] 2 ADS     Mon Aug 28, 2017 0210 Dispense Rashawn, Ads Dispense ED [facility] 2 ADS     Sun Aug 27, 2017 2022 Waste Rashawn, Ads Dispense ED [facility] 2 ADS     Sun Aug 27, 2017 1956 Dispense Rashawn, Ads Dispense ED [facility] 2 ADS     Sun Aug 27, 2017 1546 Waste Rashawn, Ads Dispense ED [facility] 2 ADS     Sun Aug 27, 2017 1542 Dispense Rashawn, Ads Dispense ED [facility] 2 ADS     Sun Aug 27, 2017 1339 Waste Rashawn, Ads Dispense [facility] 2 ADS     Sun Aug 27, 2017 1136 Dispense Rashawn, Ads Dispense ED [facility] 2 ADS     [place] Aug 27, 2017 0800 Order Transfer Rashawn, Sameera Mcconnell ED [facility] 2 ADS     Kristal Husain Aug 27, 2017 8283 Waste Rashawn, Ads Dispense ED [facility] ED ADS     Sun Aug 27, 2017 0740 Dispense Rashawn, Ads Dispense ED [facility] ED ADS     Sun Aug 27, 2017 0701 Verify MICHAEL Morales ED [facility] ED ADS     HYDROmorphone (PF) (DILAUDID) injection 1 mg   [[id number]]  Order Details     Ordered Dose: 1 mg Route: IntraVENous Frequency: NOW     Administration Dose: 1 mg        Sun Aug 27, 2017 0213 Waste Rashawn, Ads Dispense ED [facility] ED ADS     Sun Aug 27, 2017 0213 Dispense Rashawn, Ads Dispense ED [facility] ED ADS     HYDROmorphone (PF) (DILAUDID) injection 1 mg   [[id number]]  Order Details     Ordered Dose: 1 mg Route: IntraVENous Frequency: EVERY 4 HOURS AS NEEDED for Severe Pain     Wed Aug 30, 2017 1043 Dispense Rashawn, Ads Dispense [facility] 2 ADS     Wed Aug 30, 2017 0619 Dispense Rashawn, Ads Dispense [facility] 2 ADS     Wed Aug 30, 2017 0234 Dispense Rashawn, Ads Dispense [facility] 2 ADS     Tue Aug 29, 2017 2228 Dispense Rashawn, Ads Dispense [facility] 2 ADS     Tue Aug 29, 2017 1847 Dispense Rashawn, Ads Dispense [facility] 2 ADS     Tue Aug 29, 2017 1539 Dispense Rashawn, Ads Dispense [facility] 2 ADS     Tue Aug 29, 2017 0955 Dispense Rashawn, Ads Dispense [facility] 2 ADS     Tue Aug 29, 2017 0726 Dispense Rashawn, Ads Dispense [facility] 2 ADS     Tue Aug 29, 2017 0238 Dispense Rashawn, Ads Dispense [facility] 2 ADS     Mon Aug 28, 2017 4730 Dispense Rashawn, Ads Dispense [facility] 2 ADS     Mon Aug 28, 2017 1852 Dispense Rashawn, Ads Dispense [address] [facility] 2 ADS     Mon Aug 28, 2017 1459 Dispense Rashawn, Ads Dispense [address] [facility] 2 ADS     Mon Aug 28, 2017 1053 Dispense Rashawn, Ads Dispense [address] [facility] 2 ADS     Mon Aug 28, 2017 1019 Verify MICHAEL [facility] 2 ADS            [place]  Pain control with IV Pain medications    Decision    Additional comments         Sincerely     Sujey Pickard MD MPH FACP   Physician Adviser, REJI Metz [address]   Cell: [phone]

## 2018-02-21 ENCOUNTER — OP HISTORICAL/CONVERTED ENCOUNTER (OUTPATIENT)
Dept: OTHER | Age: 38
End: 2018-02-21

## 2018-03-15 ENCOUNTER — ED HISTORICAL/CONVERTED ENCOUNTER (OUTPATIENT)
Dept: OTHER | Age: 38
End: 2018-03-15

## 2018-04-17 ENCOUNTER — OP HISTORICAL/CONVERTED ENCOUNTER (OUTPATIENT)
Dept: OTHER | Age: 38
End: 2018-04-17

## 2018-05-02 ENCOUNTER — OP HISTORICAL/CONVERTED ENCOUNTER (OUTPATIENT)
Dept: OTHER | Age: 38
End: 2018-05-02

## 2020-08-19 ENCOUNTER — TELEPHONE (OUTPATIENT)
Dept: FAMILY MEDICINE CLINIC | Age: 40
End: 2020-08-19

## 2020-08-19 NOTE — TELEPHONE ENCOUNTER
(May be duplicate patient tried to put thru request on portal but was not sure if it came thru)    Patient called to refill vyvanse 70mg  -[address] [facility]

## 2020-08-21 ENCOUNTER — TELEPHONE (OUTPATIENT)
Dept: FAMILY MEDICINE CLINIC | Age: 40
End: 2020-08-21

## 2020-08-21 VITALS
OXYGEN SATURATION: 99 % | DIASTOLIC BLOOD PRESSURE: 92 MMHG | RESPIRATION RATE: 16 BRPM | SYSTOLIC BLOOD PRESSURE: 150 MMHG | TEMPERATURE: 98.5 F | WEIGHT: 182 LBS | HEART RATE: 101 BPM | HEIGHT: 65 IN | BODY MASS INDEX: 30.32 KG/M2

## 2020-08-21 DIAGNOSIS — F90.2 ADHD (ATTENTION DEFICIT HYPERACTIVITY DISORDER), COMBINED TYPE: ICD-10-CM

## 2020-08-21 RX ORDER — GABAPENTIN 300 MG/1
300 CAPSULE ORAL 3 TIMES DAILY
COMMUNITY
End: 2020-08-27

## 2020-08-21 RX ORDER — NAPROXEN 500 MG/1
500 TABLET ORAL 2 TIMES DAILY WITH MEALS
COMMUNITY
End: 2020-08-27

## 2020-08-21 RX ORDER — LISDEXAMFETAMINE DIMESYLATE 70 MG/1
CAPSULE ORAL
Qty: 30 CAP | Refills: 0 | Status: SHIPPED | OUTPATIENT
Start: 2020-08-21 | End: 2020-09-16 | Stop reason: SDUPTHER

## 2020-08-21 RX ORDER — OMEPRAZOLE 40 MG/1
40 CAPSULE, DELAYED RELEASE ORAL DAILY
COMMUNITY
End: 2021-06-03

## 2020-08-21 NOTE — TELEPHONE ENCOUNTER
Vyvanse 70 mg was sent to the wrong [facility]. It went to [address] [facility] [address] instead of the in [address].   Please send to [facility] #[id number]

## 2020-08-27 ENCOUNTER — OFFICE VISIT (OUTPATIENT)
Dept: FAMILY MEDICINE CLINIC | Age: 40
End: 2020-08-27
Payer: COMMERCIAL

## 2020-08-27 VITALS
SYSTOLIC BLOOD PRESSURE: 120 MMHG | DIASTOLIC BLOOD PRESSURE: 80 MMHG | BODY MASS INDEX: 30.89 KG/M2 | HEIGHT: 65 IN | TEMPERATURE: 97.7 F | WEIGHT: 185.38 LBS | OXYGEN SATURATION: 98 % | HEART RATE: 98 BPM

## 2020-08-27 DIAGNOSIS — Z12.31 ENCOUNTER FOR SCREENING MAMMOGRAM FOR MALIGNANT NEOPLASM OF BREAST: ICD-10-CM

## 2020-08-27 DIAGNOSIS — F90.2 ADHD (ATTENTION DEFICIT HYPERACTIVITY DISORDER), COMBINED TYPE: Primary | ICD-10-CM

## 2020-08-27 DIAGNOSIS — F41.1 GENERALIZED ANXIETY DISORDER: ICD-10-CM

## 2020-08-27 PROCEDURE — 99213 OFFICE O/P EST LOW 20 MIN: CPT | Performed by: NURSE PRACTITIONER

## 2020-08-27 RX ORDER — HYDROXYZINE 50 MG/1
50 TABLET, FILM COATED ORAL
COMMUNITY
Start: 2020-07-15 | End: 2020-09-03

## 2020-08-27 RX ORDER — CITALOPRAM 40 MG/1
40 TABLET, FILM COATED ORAL
COMMUNITY
Start: 2020-08-11 | End: 2020-11-05

## 2020-08-27 NOTE — PROGRESS NOTES
Subjective  Chief Complaint   Patient presents with    Follow-up     anxiety/meds     HPI:  Misael Valadez is a 36 y.o. female. Patient presents for management of ADHD and anxiety. Anxiety remains well controlled with Celexa daily and Hydroxyzine prn. Takes Hydroxyzine 2-3 times per week.      Work Performance: not employed  Organization: good  Appetite: normal, no binge eating, weight stable  Mood: stable  Sleep: good, not tired during the day  Friends: well connected with peers  Family: worried about virtual schooling 1child this year  Self esteem: not a concern    Past Medical History:   Diagnosis Date    ADHD (attention deficit hyperactivity disorder)     Anxiety     Anxiety     Asthma     CHILDHOOD    Chronic back pain     Chronic pain     DISC ISSUES IN BACK    Psychiatric disorder     ANXIETY    PTSD (post-traumatic stress disorder)      Family History   Problem Relation Age of Onset    Anxiety Mother     Hypertension Mother     COPD Mother     Arthritis-osteo Mother     Psychiatric Disorder Mother     Hypertension Father     Cancer Brother     Schizophrenia Maternal Aunt     Heart Disease Maternal Grandfather     Stroke Maternal Grandfather     Cancer Paternal Grandmother     Anesth Problems Neg Hx      Social History     Socioeconomic History    Marital status: LEGALLY      Spouse name: Not on file    Number of children: Not on file    Years of education: Not on file    Highest education level: Not on file   Occupational History    Not on file   Social Needs    Financial resource strain: Not on file    Food insecurity     Worry: Not on file     Inability: Not on file    Transportation needs     Medical: Not on file     Non-medical: Not on file   Tobacco Use    Smoking status: Former Smoker     Packs/day: 0.25     Years: 19.00     Pack years: 4.75     Last attempt to quit: 2019     Years since quittin.3    Smokeless tobacco: Never Used    Tobacco comment: smokes vapor cig    Substance and Sexual Activity    Alcohol use: Yes     Alcohol/week: 0.0 standard drinks     Comment: Socially.  Drug use: No    Sexual activity: Yes     Partners: Male     Birth control/protection: Surgical   Lifestyle    Physical activity     Days per week: Not on file     Minutes per session: Not on file    Stress: Not on file   Relationships    Social connections     Talks on phone: Not on file     Gets together: Not on file     Attends Evangelical service: Not on file     Active member of club or organization: Not on file     Attends meetings of clubs or organizations: Not on file     Relationship status: Not on file    Intimate partner violence     Fear of current or ex partner: Not on file     Emotionally abused: Not on file     Physically abused: Not on file     Forced sexual activity: Not on file   Other Topics Concern    Not on file   Social History Narrative    Not on file     Current Outpatient Medications on File Prior to Visit   Medication Sig Dispense Refill    hydrOXYzine HCL (ATARAX) 50 mg tablet Take 50 mg by mouth.  citalopram (CELEXA) 40 mg tablet Take 40 mg by mouth.  Vyvanse 70 mg cap TK 1 C PO every day (Last refill until seen in the office) 30 Cap 0    omeprazole (PRILOSEC) 40 mg capsule Take 40 mg by mouth daily.  [DISCONTINUED] naproxen (NAPROSYN) 500 mg tablet Take 500 mg by mouth two (2) times daily (with meals).  [DISCONTINUED] gabapentin (NEURONTIN) 300 mg capsule Take 300 mg by mouth three (3) times daily.  [DISCONTINUED] dicyclomine (BENTYL) 10 mg capsule Take 1 Cap by mouth four (4) times daily. 20 Cap 0    [DISCONTINUED] ondansetron (ZOFRAN ODT) 4 mg disintegrating tablet Take 1 Tab by mouth every eight (8) hours as needed for Nausea. 10 Tab 0    [DISCONTINUED] famotidine (PEPCID) 20 mg tablet Take 1 Tab by mouth two (2) times a day.  20 Tab 0    [DISCONTINUED] clonazePAM (KLONOPIN) 0.5 mg tablet Take 0.5 mg by mouth two (2) times a day.      [DISCONTINUED] escitalopram oxalate (LEXAPRO) 20 mg tablet Take 20 mg by mouth daily. No current facility-administered medications on file prior to visit. Allergies   Allergen Reactions    Other Food Itching     Fresh fruit, cannot be specific    Codeine Hives     Allergic to tylenol with codeine combo    Tylenol-Codeine #2 Hives    Nuts [Tree Nut] Itching     ROS  See HPI for pertinent ROS. Objective  Physical Exam  Vitals signs and nursing note reviewed. Constitutional:       General: She is not in acute distress. Appearance: Normal appearance. She is obese. HENT:      Head: Normocephalic. Eyes:      Extraocular Movements: Extraocular movements intact. Cardiovascular:      Rate and Rhythm: Normal rate and regular rhythm. Heart sounds: Normal heart sounds. Pulmonary:      Effort: Pulmonary effort is normal.      Breath sounds: Normal breath sounds. Musculoskeletal: Normal range of motion. Right lower leg: No edema. Left lower leg: No edema. Skin:     General: Skin is warm and dry. Neurological:      Mental Status: She is alert and oriented to person, place, and time. Psychiatric:         Mood and Affect: Mood normal.         Behavior: Behavior normal.          Assessment & Plan      ICD-10-CM ICD-9-CM    1. ADHD (attention deficit hyperactivity disorder), combined type  F90.2 314.01    2. Generalized anxiety disorder  F41.1 300.02    3. Encounter for screening mammogram for malignant neoplasm of breast  Z12.31 V76.12 [facility] MAMMO BI SCREENING INCL CAD     Diagnoses and all orders for this visit:    1. ADHD (attention deficit hyperactivity disorder), combined type   checked, no misuse or abuse noted. ADHD remains well controlled on current med and dose, no changes.   Last refilled 8/21/2020.    2. Generalized anxiety disorder  Anxiety remains well controlled with daily and as needed meds  Reminded to take Celexa daily and do not abruptly discontinue. 3. Encounter for screening mammogram for malignant neoplasm of breast  Due for initial screening.  -     [facility] MAMMO BI SCREENING INCL CAD; Future      Follow-up and Dispositions    · Return in about 4 months (around 12/27/2020) for wellness, fasting labs, follow up, adhd, anxiety, lipids.            Alondra Woodward NP

## 2020-09-03 RX ORDER — HYDROXYZINE 50 MG/1
TABLET, FILM COATED ORAL
Qty: 15 TAB | Refills: 0 | Status: SHIPPED | OUTPATIENT
Start: 2020-09-03 | End: 2020-10-06

## 2020-09-16 DIAGNOSIS — F90.2 ADHD (ATTENTION DEFICIT HYPERACTIVITY DISORDER), COMBINED TYPE: ICD-10-CM

## 2020-09-17 RX ORDER — LISDEXAMFETAMINE DIMESYLATE 70 MG/1
CAPSULE ORAL
Qty: 30 CAP | Refills: 0 | Status: SHIPPED | OUTPATIENT
Start: 2020-09-18 | End: 2020-10-13 | Stop reason: SDUPTHER

## 2020-10-06 RX ORDER — HYDROXYZINE 50 MG/1
TABLET, FILM COATED ORAL
Qty: 15 TAB | Refills: 0 | Status: SHIPPED | OUTPATIENT
Start: 2020-10-06 | End: 2020-11-05

## 2020-10-13 DIAGNOSIS — F90.2 ADHD (ATTENTION DEFICIT HYPERACTIVITY DISORDER), COMBINED TYPE: ICD-10-CM

## 2020-10-15 RX ORDER — LISDEXAMFETAMINE DIMESYLATE 70 MG/1
CAPSULE ORAL
Qty: 30 CAP | Refills: 0 | Status: SHIPPED | OUTPATIENT
Start: 2020-10-15 | End: 2020-11-10 | Stop reason: SDUPTHER

## 2020-11-05 RX ORDER — HYDROXYZINE 50 MG/1
TABLET, FILM COATED ORAL
Qty: 15 TAB | Refills: 0 | Status: SHIPPED | OUTPATIENT
Start: 2020-11-05 | End: 2020-12-07 | Stop reason: SDUPTHER

## 2020-11-05 RX ORDER — CITALOPRAM 40 MG/1
TABLET, FILM COATED ORAL
Qty: 90 TAB | Refills: 0 | Status: SHIPPED | OUTPATIENT
Start: 2020-11-05 | End: 2021-02-28 | Stop reason: SDUPTHER

## 2020-11-10 DIAGNOSIS — F90.2 ADHD (ATTENTION DEFICIT HYPERACTIVITY DISORDER), COMBINED TYPE: ICD-10-CM

## 2020-11-11 RX ORDER — LISDEXAMFETAMINE DIMESYLATE 70 MG/1
CAPSULE ORAL
Qty: 30 CAP | Refills: 0 | Status: SHIPPED | OUTPATIENT
Start: 2020-11-12 | End: 2020-12-07 | Stop reason: SDUPTHER

## 2020-12-07 DIAGNOSIS — F90.2 ADHD (ATTENTION DEFICIT HYPERACTIVITY DISORDER), COMBINED TYPE: ICD-10-CM

## 2020-12-08 RX ORDER — HYDROXYZINE 50 MG/1
50 TABLET, FILM COATED ORAL
Qty: 15 TAB | Refills: 0 | Status: SHIPPED | OUTPATIENT
Start: 2020-12-09 | End: 2021-01-04 | Stop reason: SDUPTHER

## 2020-12-08 RX ORDER — LISDEXAMFETAMINE DIMESYLATE 70 MG/1
CAPSULE ORAL
Qty: 30 CAP | Refills: 0 | Status: SHIPPED | OUTPATIENT
Start: 2020-12-09 | End: 2021-01-04 | Stop reason: SDUPTHER

## 2020-12-10 NOTE — ED NOTES
Patient taken to bathroom via wheelchair to obtain urine specimen. Mart-1 - Negative Histology Text: MART-1 staining demonstrates a normal density and pattern of melanocytes along the dermal-epidermal junction. The surgical margins are negative for tumor cells.

## 2021-01-04 ENCOUNTER — PATIENT MESSAGE (OUTPATIENT)
Dept: FAMILY MEDICINE CLINIC | Age: 41
End: 2021-01-04

## 2021-01-05 ENCOUNTER — TELEPHONE (OUTPATIENT)
Dept: FAMILY MEDICINE CLINIC | Age: 41
End: 2021-01-05

## 2021-01-12 ENCOUNTER — OFFICE VISIT (OUTPATIENT)
Dept: FAMILY MEDICINE CLINIC | Age: 41
End: 2021-01-12
Payer: COMMERCIAL

## 2021-01-12 VITALS
DIASTOLIC BLOOD PRESSURE: 78 MMHG | TEMPERATURE: 98 F | WEIGHT: 193.13 LBS | HEART RATE: 97 BPM | OXYGEN SATURATION: 97 % | BODY MASS INDEX: 32.18 KG/M2 | HEIGHT: 65 IN | SYSTOLIC BLOOD PRESSURE: 140 MMHG

## 2021-01-12 DIAGNOSIS — Z12.31 ENCOUNTER FOR SCREENING MAMMOGRAM FOR MALIGNANT NEOPLASM OF BREAST: ICD-10-CM

## 2021-01-12 DIAGNOSIS — Z13.220 SCREENING FOR HYPERLIPIDEMIA: ICD-10-CM

## 2021-01-12 DIAGNOSIS — R23.2 HOT FLASHES: ICD-10-CM

## 2021-01-12 DIAGNOSIS — Z72.0 VAPES NICOTINE CONTAINING SUBSTANCE: ICD-10-CM

## 2021-01-12 DIAGNOSIS — Z00.00 WELLNESS EXAMINATION: Primary | ICD-10-CM

## 2021-01-12 DIAGNOSIS — Z23 ENCOUNTER FOR IMMUNIZATION: ICD-10-CM

## 2021-01-12 DIAGNOSIS — E66.09 CLASS 1 OBESITY DUE TO EXCESS CALORIES WITH SERIOUS COMORBIDITY AND BODY MASS INDEX (BMI) OF 32.0 TO 32.9 IN ADULT: ICD-10-CM

## 2021-01-12 DIAGNOSIS — F41.1 GENERALIZED ANXIETY DISORDER: ICD-10-CM

## 2021-01-12 DIAGNOSIS — R03.0 BORDERLINE BLOOD PRESSURE: ICD-10-CM

## 2021-01-12 DIAGNOSIS — F90.2 ADHD (ATTENTION DEFICIT HYPERACTIVITY DISORDER), COMBINED TYPE: ICD-10-CM

## 2021-01-12 PROBLEM — K59.00 CONSTIPATION: Status: RESOLVED | Noted: 2017-08-27 | Resolved: 2021-01-12

## 2021-01-12 PROBLEM — K61.1 PERIRECTAL ABSCESS: Status: RESOLVED | Noted: 2017-05-22 | Resolved: 2021-01-12

## 2021-01-12 PROBLEM — R10.9 ABDOMINAL PAIN: Status: RESOLVED | Noted: 2017-08-27 | Resolved: 2021-01-12

## 2021-01-12 PROBLEM — R11.2 INTRACTABLE NAUSEA AND VOMITING: Status: RESOLVED | Noted: 2017-08-27 | Resolved: 2021-01-12

## 2021-01-12 PROCEDURE — 99396 PREV VISIT EST AGE 40-64: CPT | Performed by: NURSE PRACTITIONER

## 2021-01-12 NOTE — PROGRESS NOTES
Subjective  Chief Complaint   Patient presents with   Osman Mccarthy Annual Wellness Visit    Labs    Follow Up Chronic Condition     ADHD, anxiety, lipids     HPI:  George Bo is a 36 y.o. female. Patient presents for wellness, labs, and management of ADHD and anxiety. Also wondering if she is in Menopause. H/o hysterectomy. Reports hot flashes. Mother was menopausal in her 45s. Medications reviewed, taking as prescribed with no known side effects. Anxiety is well controlled with Celexa daily and Hydroxyzine prn, typically 2-3 times per week. Omeprazole prn heartburn and stomach cramps. ADHD is well controlled with Vyvanse 70 mg daily.   Work Performance: not working  Organization: good  Appetite: normal, no binge eating, weight has been steadily increasing over the past several months  Mood: stable  Sleep: good  Friends: well connected with peers  Family: no new stressors  Self esteem: high    Immunizations:  Flu: due now  Tetanus: 2016    LMP: hysterectomy  Pap: 2-3 years  Mammo: due  Smoking status: vapes daily, interested in cessation but not motivated    Moods: at goal  Diet: working on portion control and to limit evening snacking  Exercise: not regularly, does not like to exercise  Vision exams: annual  Dental exams: does not have insurance        Past Medical History:   Diagnosis Date    ADHD (attention deficit hyperactivity disorder)     Anxiety     Asthma     CHILDHOOD    Chronic back pain     Chronic pain     DISC ISSUES IN BACK    PTSD (post-traumatic stress disorder)      Family History   Problem Relation Age of Onset    Anxiety Mother     Hypertension Mother     COPD Mother     Arthritis-osteo Mother     Psychiatric Disorder Mother     Hypertension Father     Cancer Brother     Schizophrenia Maternal Aunt     Heart Disease Maternal Grandfather     Stroke Maternal Grandfather     Cancer Paternal Grandmother     Anesth Problems Neg Hx      Social History     Socioeconomic History    Marital status: LEGALLY      Spouse name: Not on file    Number of children: Not on file    Years of education: Not on file    Highest education level: Not on file   Occupational History    Not on file   Social Needs    Financial resource strain: Not on file    Food insecurity     Worry: Not on file     Inability: Not on file    Transportation needs     Medical: Not on file     Non-medical: Not on file   Tobacco Use    Smoking status: Former Smoker     Packs/day: 0.25     Years: 19.00     Pack years: 4.75     Quit date: 2019     Years since quittin.7    Smokeless tobacco: Never Used    Tobacco comment: smokes vapor cig    Substance and Sexual Activity    Alcohol use: Yes     Alcohol/week: 0.0 standard drinks     Comment: Socially.  Drug use: No    Sexual activity: Yes     Partners: Male     Birth control/protection: Surgical   Lifestyle    Physical activity     Days per week: Not on file     Minutes per session: Not on file    Stress: Not on file   Relationships    Social connections     Talks on phone: Not on file     Gets together: Not on file     Attends Zoroastrian service: Not on file     Active member of club or organization: Not on file     Attends meetings of clubs or organizations: Not on file     Relationship status: Not on file    Intimate partner violence     Fear of current or ex partner: Not on file     Emotionally abused: Not on file     Physically abused: Not on file     Forced sexual activity: Not on file   Other Topics Concern    Not on file   Social History Narrative    Not on file     Current Outpatient Medications on File Prior to Visit   Medication Sig Dispense Refill    hydrOXYzine HCL (ATARAX) 50 mg tablet Take 1 Tab by mouth three (3) times daily as needed for Anxiety.  15 Tab 0    Vyvanse 70 mg cap TK 1 C PO every day 30 Cap 0    citalopram (CELEXA) 40 mg tablet TAKE 1 TABLET BY MOUTH EVERY DAY 90 Tab 0    omeprazole (PRILOSEC) 40 mg capsule Take 40 mg by mouth daily. No current facility-administered medications on file prior to visit. Allergies   Allergen Reactions    Other Food Itching     Fresh fruit, cannot be specific    Codeine Hives     Allergic to tylenol with codeine combo    Tylenol-Codeine #2 Hives    Nuts [Tree Nut] Itching     Review of Systems   Constitutional: Negative for chills, fever and weight loss. HENT: Negative for congestion, ear pain, hearing loss, sinus pain and sore throat. Denies difficulty swallowing. Eyes: Negative for blurred vision. Respiratory: Negative for cough, shortness of breath and wheezing. Cardiovascular: Negative for chest pain, palpitations, claudication and leg swelling. Gastrointestinal: Negative for abdominal pain, constipation, diarrhea and heartburn. Genitourinary: Negative for dysuria. Musculoskeletal: Negative for joint pain and myalgias. Neurological: Negative for dizziness, tingling, weakness and headaches. Psychiatric/Behavioral: Negative for depression. The patient is not nervous/anxious. Objective  Physical Exam  Vitals signs and nursing note reviewed. Constitutional:       General: She is not in acute distress. Appearance: Normal appearance. She is obese. HENT:      Head: Normocephalic. Mouth/Throat:      Pharynx: No posterior oropharyngeal erythema. Eyes:      Extraocular Movements: Extraocular movements intact. Neck:      Musculoskeletal: Normal range of motion and neck supple. Thyroid: No thyroid mass, thyromegaly or thyroid tenderness. Cardiovascular:      Rate and Rhythm: Normal rate and regular rhythm. Heart sounds: Normal heart sounds. Pulmonary:      Effort: Pulmonary effort is normal.      Breath sounds: Normal breath sounds. Abdominal:      General: Bowel sounds are normal.      Palpations: Abdomen is soft. There is no mass. Tenderness: There is no abdominal tenderness.       Comments: Limited by habitus   Musculoskeletal: Normal range of motion. Right lower leg: No edema. Left lower leg: No edema. Lymphadenopathy:      Cervical: No cervical adenopathy. Upper Body:      Right upper body: No supraclavicular adenopathy. Left upper body: No supraclavicular adenopathy. Skin:     General: Skin is warm and dry. Neurological:      General: No focal deficit present. Mental Status: She is alert and oriented to person, place, and time. Psychiatric:         Mood and Affect: Mood normal.         Behavior: Behavior normal.         Thought Content: Thought content normal.         Judgment: Judgment normal.          Assessment & Plan      ICD-10-CM ICD-9-CM    1. Wellness examination  Z00.00 V70.0    2. Screening for hyperlipidemia  Z13.220 V77.91 CBC WITH AUTOMATED DIFF      LIPID PANEL      METABOLIC PANEL, COMPREHENSIVE   3. Class 1 obesity due to excess calories with serious comorbidity and body mass index (BMI) of 32.0 to 32.9 in adult  E66.09 278.00 TSH RFX ON ABNORMAL TO FREE T4    Z68.32 V85.32    4. Encounter for immunization  Z23 V03.89    5. Encounter for screening mammogram for malignant neoplasm of breast  Z12.31 V76.12 [facility] BI SCREENING INCL CAD   6. Vapes nicotine containing substance  Z72.0 305.1    7. ADHD (attention deficit hyperactivity disorder), combined type  F90.2 314.01    8. Generalized anxiety disorder  F41.1 300.02    9. Borderline blood pressure  R03.0 796.2    10. Hot flashes  R23.2 782.62 [address] [facility] AND      Diagnoses and all orders for this visit:    1. Wellness examination  We are getting patient up-to-date on preventative measures as listed. 2. Screening for hyperlipidemia  Fasting labs.     -     CBC WITH AUTOMATED DIFF  -     LIPID PANEL  -     METABOLIC PANEL, COMPREHENSIVE    3. Class 1 obesity due to excess calories with serious comorbidity and body mass index (BMI) of 32.0 to 32.9 in adult  Her weight has steadily been increasing over the past several years. We are checking her thyroid level today. Also encouraged her to strive for 150 minutes per week of moderate exercise that causes mild breathlessness. Eat a healthy diet low in salt, rich in lean meats, fresh fruits and vegetables, and whole wheats and grains.   -     TSH RFX ON ABNORMAL TO FREE T4    4. Encounter for immunization  Advised to receive flu vaccine from health department. 5. Encounter for screening mammogram for malignant neoplasm of breast  Due for initial screening.  -     [facility] MAMMO BI SCREENING INCL CAD; Future    6. Vapes nicotine containing substance  Not interested in cessation at this time. Encouraged to call when she is ready for assistance. 7. ADHD (attention deficit hyperactivity disorder), combined type  ADHD remains well controlled on current medication and dose. No changes. Medication will be refilled when requested. 8. Generalized anxiety disorder  Well-controlled with daily and prn medication. Reminded to take Celexa daily and do not abruptly discontinue. 9. Borderline blood pressure  BP is borderline in the office today but has been normal in the past.    10. Hot flashes  Labs as requested. We discussed how an accurate these labs can be during perimenopause.  [facility] AND       Follow-up and Dispositions    · Return in about 4 months (around 5/12/2021) for follow up, adhd, VV ok.            Dante Morales NP

## 2021-01-13 LAB
ALBUMIN SERPL-MCNC: 4.5 G/DL (ref 3.8–4.8)
ALBUMIN/GLOB SERPL: 1.8 {RATIO} (ref 1.2–2.2)
ALP SERPL-CCNC: 74 IU/L (ref 39–117)
ALT SERPL-CCNC: 17 IU/L (ref 0–32)
AST SERPL-CCNC: 16 IU/L (ref 0–40)
BASOPHILS # BLD AUTO: 0 X10E3/UL (ref 0–0.2)
BASOPHILS NFR BLD AUTO: 1 %
BILIRUB SERPL-MCNC: <0.2 MG/DL (ref 0–1.2)
BUN SERPL-MCNC: 8 MG/DL (ref 6–24)
BUN/CREAT SERPL: 12 (ref 9–23)
CALCIUM SERPL-MCNC: 9.5 MG/DL (ref 8.7–10.2)
CHLORIDE SERPL-SCNC: 104 MMOL/L (ref 96–106)
CHOLEST SERPL-MCNC: 261 MG/DL (ref 100–199)
CO2 SERPL-SCNC: 24 MMOL/L (ref 20–29)
CREAT SERPL-MCNC: 0.69 MG/DL (ref 0.57–1)
EOSINOPHIL # BLD AUTO: 0.2 X10E3/UL (ref 0–0.4)
EOSINOPHIL NFR BLD AUTO: 2 %
ERYTHROCYTE [DISTWIDTH] IN BLOOD BY AUTOMATED COUNT: 13 % (ref 11.7–15.4)
FSH SERPL-ACNC: 13.4 MIU/ML
GLOBULIN SER CALC-MCNC: 2.5 G/DL (ref 1.5–4.5)
GLUCOSE SERPL-MCNC: 86 MG/DL (ref 65–99)
HCT VFR BLD AUTO: 37.7 % (ref 34–46.6)
HDLC SERPL-MCNC: 59 MG/DL
HGB BLD-MCNC: 12.9 G/DL (ref 11.1–15.9)
IMM GRANULOCYTES # BLD AUTO: 0 X10E3/UL (ref 0–0.1)
IMM GRANULOCYTES NFR BLD AUTO: 0 %
LDLC SERPL CALC-MCNC: 177 MG/DL (ref 0–99)
LH SERPL-ACNC: 13.6 MIU/ML
LYMPHOCYTES # BLD AUTO: 1.9 X10E3/UL (ref 0.7–3.1)
LYMPHOCYTES NFR BLD AUTO: 25 %
MCH RBC QN AUTO: 32.3 PG (ref 26.6–33)
MCHC RBC AUTO-ENTMCNC: 34.2 G/DL (ref 31.5–35.7)
MCV RBC AUTO: 95 FL (ref 79–97)
MONOCYTES # BLD AUTO: 0.4 X10E3/UL (ref 0.1–0.9)
MONOCYTES NFR BLD AUTO: 5 %
NEUTROPHILS # BLD AUTO: 5 X10E3/UL (ref 1.4–7)
NEUTROPHILS NFR BLD AUTO: 67 %
PLATELET # BLD AUTO: 241 X10E3/UL (ref 150–450)
POTASSIUM SERPL-SCNC: 4 MMOL/L (ref 3.5–5.2)
PROT SERPL-MCNC: 7 G/DL (ref 6–8.5)
RBC # BLD AUTO: 3.99 X10E6/UL (ref 3.77–5.28)
SODIUM SERPL-SCNC: 141 MMOL/L (ref 134–144)
TRIGL SERPL-MCNC: 137 MG/DL (ref 0–149)
TSH SERPL DL<=0.005 MIU/L-ACNC: 1.34 UIU/ML (ref 0.45–4.5)
VLDLC SERPL CALC-MCNC: 25 MG/DL (ref 5–40)
WBC # BLD AUTO: 7.4 X10E3/UL (ref 3.4–10.8)

## 2021-01-15 ENCOUNTER — APPOINTMENT (OUTPATIENT)
Dept: ULTRASOUND IMAGING | Age: 41
End: 2021-01-15
Attending: EMERGENCY MEDICINE
Payer: COMMERCIAL

## 2021-01-15 ENCOUNTER — HOSPITAL ENCOUNTER (EMERGENCY)
Age: 41
Discharge: HOME | End: 2021-01-15
Attending: EMERGENCY MEDICINE
Payer: COMMERCIAL

## 2021-01-15 ENCOUNTER — APPOINTMENT (OUTPATIENT)
Dept: CT IMAGING | Age: 41
End: 2021-01-15
Attending: EMERGENCY MEDICINE
Payer: COMMERCIAL

## 2021-01-15 VITALS
OXYGEN SATURATION: 98 % | SYSTOLIC BLOOD PRESSURE: 136 MMHG | RESPIRATION RATE: 16 BRPM | BODY MASS INDEX: 31.65 KG/M2 | TEMPERATURE: 97.7 F | WEIGHT: 190 LBS | HEIGHT: 65 IN | HEART RATE: 74 BPM | DIASTOLIC BLOOD PRESSURE: 71 MMHG

## 2021-01-15 DIAGNOSIS — N83.299 OVARIAN CYST, COMPLEX: Primary | ICD-10-CM

## 2021-01-15 LAB
ALBUMIN SERPL-MCNC: 3.4 G/DL (ref 3.5–5)
ALBUMIN/GLOB SERPL: 1.1 {RATIO} (ref 1.1–2.2)
ALP SERPL-CCNC: 59 U/L (ref 45–117)
ALT SERPL-CCNC: 18 U/L (ref 12–78)
ANION GAP SERPL CALC-SCNC: 5 MMOL/L (ref 5–15)
APPEARANCE UR: ABNORMAL
AST SERPL W P-5'-P-CCNC: 11 U/L (ref 15–37)
BACTERIA URNS QL MICRO: ABNORMAL /HPF
BACTERIA URNS QL MICRO: NEGATIVE /HPF
BILIRUB SERPL-MCNC: 0.2 MG/DL (ref 0.2–1)
BILIRUB UR QL: NEGATIVE
BUN SERPL-MCNC: 24 MG/DL (ref 6–20)
BUN/CREAT SERPL: 32 (ref 12–20)
CA-I BLD-MCNC: 8.7 MG/DL (ref 8.5–10.1)
CHLORIDE SERPL-SCNC: 109 MMOL/L (ref 97–108)
CO2 SERPL-SCNC: 26 MMOL/L (ref 21–32)
COLOR UR: ABNORMAL
CREAT SERPL-MCNC: 0.75 MG/DL (ref 0.55–1.02)
ERYTHROCYTE [DISTWIDTH] IN BLOOD BY AUTOMATED COUNT: 13.8 % (ref 11.5–14.5)
GLOBULIN SER CALC-MCNC: 3.2 G/DL (ref 2–4)
GLUCOSE SERPL-MCNC: 99 MG/DL (ref 65–100)
GLUCOSE UR STRIP.AUTO-MCNC: NEGATIVE MG/DL
HCG SERPL QL: NEGATIVE
HCT VFR BLD AUTO: 34.6 % (ref 35–47)
HGB BLD-MCNC: 11.8 G/DL (ref 11.5–16)
HGB UR QL STRIP: NEGATIVE
KETONES UR QL STRIP.AUTO: NEGATIVE MG/DL
LEUKOCYTE ESTERASE UR QL STRIP.AUTO: NEGATIVE
MCH RBC QN AUTO: 32 PG (ref 26–34)
MCHC RBC AUTO-ENTMCNC: 34.1 G/DL (ref 30–36.5)
MCV RBC AUTO: 93.8 FL (ref 80–99)
MUCOUS THREADS URNS QL MICRO: ABNORMAL /LPF
MUCOUS THREADS URNS QL MICRO: ABNORMAL /LPF
NITRITE UR QL STRIP.AUTO: NEGATIVE
OTHER URINE MICRO,5051: 1
PH UR STRIP: 5 [PH]
PLATELET # BLD AUTO: 213 K/UL (ref 150–400)
PMV BLD AUTO: 9.9 FL (ref 8.9–12.9)
POTASSIUM SERPL-SCNC: 3.6 MMOL/L (ref 3.5–5.1)
PROT SERPL-MCNC: 6.6 G/DL (ref 6.4–8.2)
PROT UR STRIP-MCNC: 30 MG/DL
RBC # BLD AUTO: 3.69 M/UL (ref 3.8–5.2)
RBC #/AREA URNS HPF: ABNORMAL /HPF (ref 0–5)
RBC #/AREA URNS HPF: ABNORMAL /HPF (ref 0–5)
SODIUM SERPL-SCNC: 140 MMOL/L (ref 136–145)
SP GR UR REFRACTOMETRY: >1.03 (ref 1–1.03)
UROBILINOGEN UR QL STRIP.AUTO: 0.1 EU/DL (ref 0.2–1)
WBC # BLD AUTO: 6.3 K/UL (ref 3.6–11)
WBC URNS QL MICRO: ABNORMAL /HPF (ref 0–4)
WBC URNS QL MICRO: ABNORMAL /HPF (ref 0–4)

## 2021-01-15 PROCEDURE — 99284 EMERGENCY DEPT VISIT MOD MDM: CPT

## 2021-01-15 PROCEDURE — 36415 COLL VENOUS BLD VENIPUNCTURE: CPT

## 2021-01-15 PROCEDURE — 84703 CHORIONIC GONADOTROPIN ASSAY: CPT

## 2021-01-15 PROCEDURE — 76856 US EXAM PELVIC COMPLETE: CPT

## 2021-01-15 PROCEDURE — 96376 TX/PRO/DX INJ SAME DRUG ADON: CPT

## 2021-01-15 PROCEDURE — 74011250636 HC RX REV CODE- 250/636: Performed by: EMERGENCY MEDICINE

## 2021-01-15 PROCEDURE — 81001 URINALYSIS AUTO W/SCOPE: CPT

## 2021-01-15 PROCEDURE — 85027 COMPLETE CBC AUTOMATED: CPT

## 2021-01-15 PROCEDURE — 80053 COMPREHEN METABOLIC PANEL: CPT

## 2021-01-15 PROCEDURE — 74176 CT ABD & PELVIS W/O CONTRAST: CPT

## 2021-01-15 PROCEDURE — 96375 TX/PRO/DX INJ NEW DRUG ADDON: CPT

## 2021-01-15 PROCEDURE — 96374 THER/PROPH/DIAG INJ IV PUSH: CPT

## 2021-01-15 RX ORDER — NAPROXEN 500 MG/1
500 TABLET ORAL 2 TIMES DAILY WITH MEALS
Qty: 30 TAB | Refills: 0 | OUTPATIENT
Start: 2021-01-15 | End: 2021-06-04

## 2021-01-15 RX ORDER — KETOROLAC TROMETHAMINE 30 MG/ML
30 INJECTION, SOLUTION INTRAMUSCULAR; INTRAVENOUS
Status: COMPLETED | OUTPATIENT
Start: 2021-01-15 | End: 2021-01-15

## 2021-01-15 RX ORDER — MORPHINE SULFATE 4 MG/ML
4 INJECTION INTRAVENOUS ONCE
Status: COMPLETED | OUTPATIENT
Start: 2021-01-15 | End: 2021-01-15

## 2021-01-15 RX ORDER — ONDANSETRON 2 MG/ML
4 INJECTION INTRAMUSCULAR; INTRAVENOUS
Status: COMPLETED | OUTPATIENT
Start: 2021-01-15 | End: 2021-01-15

## 2021-01-15 RX ORDER — MORPHINE SULFATE 2 MG/ML
4 INJECTION, SOLUTION INTRAMUSCULAR; INTRAVENOUS
Status: COMPLETED | OUTPATIENT
Start: 2021-01-15 | End: 2021-01-15

## 2021-01-15 RX ADMIN — KETOROLAC TROMETHAMINE 30 MG: 30 INJECTION, SOLUTION INTRAMUSCULAR at 06:37

## 2021-01-15 RX ADMIN — SODIUM CHLORIDE 1000 ML: 9 INJECTION, SOLUTION INTRAVENOUS at 06:37

## 2021-01-15 RX ADMIN — MORPHINE SULFATE 4 MG: 4 INJECTION INTRAVENOUS at 08:58

## 2021-01-15 RX ADMIN — ONDANSETRON 4 MG: 2 INJECTION INTRAMUSCULAR; INTRAVENOUS at 06:37

## 2021-01-15 RX ADMIN — ONDANSETRON 4 MG: 2 INJECTION INTRAMUSCULAR; INTRAVENOUS at 08:58

## 2021-01-15 RX ADMIN — MORPHINE SULFATE 4 MG: 2 INJECTION, SOLUTION INTRAMUSCULAR; INTRAVENOUS at 06:36

## 2021-01-15 NOTE — ED PROVIDER NOTES
EMERGENCY DEPARTMENT HISTORY AND PHYSICAL EXAM      Date: 1/15/2021  Patient Name: George Bo    History of Presenting Illness     Chief Complaint   Patient presents with    Abdominal Pain       History Provided By: Patient    HPI: George Bo, 36 y.o. female with a past medical history significant No significant past medical history presents to the ED with cc of right lower quadrant pain that began last night. She describes it as sharp and radiating around to the right side of her abdomen. She denies any vaginal discharge or chance of being pregnant as she has had a hysterectomy. Patient states that there are no exacerbating or relieving factors and she took some ibuprofen without complete relief of her symptoms. She specifically denies fever, chills, nausea, vomiting, chest pain, shortness of breath, rash, diarrhea, headache, night sweats, weight loss blood in her urine. Her symptoms are constant    There are no other complaints, changes, or physical findings at this time. PCP: Malik Coburn NP    No current facility-administered medications on file prior to encounter. Current Outpatient Medications on File Prior to Encounter   Medication Sig Dispense Refill    hydrOXYzine HCL (ATARAX) 50 mg tablet Take 1 Tab by mouth three (3) times daily as needed for Anxiety. 15 Tab 0    Vyvanse 70 mg cap TK 1 C PO every day 30 Cap 0    citalopram (CELEXA) 40 mg tablet TAKE 1 TABLET BY MOUTH EVERY DAY 90 Tab 0    omeprazole (PRILOSEC) 40 mg capsule Take 40 mg by mouth daily.          Past History     Past Medical History:  Past Medical History:   Diagnosis Date    ADHD (attention deficit hyperactivity disorder)     Anxiety     Asthma     CHILDHOOD    Chronic back pain     Chronic pain     DISC ISSUES IN BACK    PTSD (post-traumatic stress disorder)        Past Surgical History:  Past Surgical History:   Procedure Laterality Date    HX BREAST REDUCTION  2002    HX CHOLECYSTECTOMY      HX CYST REMOVAL Right     ganglion cyst removed from right wrist    HX GYN  2012    BTL    HX GYN      D&C X 2    HX GYN  2015    Hysterectomy    HX OTHER SURGICAL Right 2013    Ganglion cyst removal from wrist    HX OTHER SURGICAL      rectal surgery    HX TONSIL AND ADENOIDECTOMY      HX WISDOM TEETH EXTRACTION         Family History:  Family History   Problem Relation Age of Onset    Anxiety Mother     Hypertension Mother     COPD Mother     Arthritis-osteo Mother     Psychiatric Disorder Mother     Hypertension Father     Cancer Brother     Schizophrenia Maternal Aunt     Heart Disease Maternal Grandfather     Stroke Maternal Grandfather     Cancer Paternal Grandmother     Anesth Problems Neg Hx        Social History:  Social History     Tobacco Use    Smoking status: Former Smoker     Packs/day: 0.25     Years: 19.00     Pack years: 4.75     Quit date: 2019     Years since quittin.7    Smokeless tobacco: Current User    Tobacco comment: smokes vapor cig    Substance Use Topics    Alcohol use: Yes     Alcohol/week: 0.0 standard drinks     Comment: Socially.  Drug use: No       Allergies: Allergies   Allergen Reactions    Other Food Itching     Fresh fruit, cannot be specific    Codeine Hives     Allergic to tylenol with codeine combo    Tylenol-Codeine #2 Hives    Nuts [Tree Nut] Itching         Review of Systems     Review of Systems   Constitutional: Negative. Negative for appetite change, chills, fatigue and fever. HENT: Negative. Negative for congestion and sinus pain. Eyes: Negative. Negative for pain and visual disturbance. Respiratory: Negative. Negative for chest tightness and shortness of breath. Cardiovascular: Negative. Negative for chest pain. Gastrointestinal: Positive for abdominal pain. Negative for diarrhea, nausea and vomiting. Genitourinary: Negative. Negative for difficulty urinating. No discharge   Musculoskeletal: Negative. Negative for arthralgias. Skin: Negative. Negative for rash. Neurological: Negative. Negative for weakness and headaches. Hematological: Negative. Psychiatric/Behavioral: Negative. Negative for agitation. The patient is not nervous/anxious. All other systems reviewed and are negative. Physical Exam     Physical Exam  Vitals signs and nursing note reviewed. Constitutional:       General: She is in acute distress. Appearance: She is well-developed. HENT:      Head: Normocephalic and atraumatic. Nose: Nose normal.      Mouth/Throat:      Mouth: Mucous membranes are moist.      Pharynx: Oropharynx is clear. No oropharyngeal exudate. Eyes:      General:         Right eye: No discharge. Left eye: No discharge. Conjunctiva/sclera: Conjunctivae normal.      Pupils: Pupils are equal, round, and reactive to light. Neck:      Musculoskeletal: Normal range of motion and neck supple. Cardiovascular:      Rate and Rhythm: Normal rate and regular rhythm. Chest Wall: PMI is not displaced. No thrill. Heart sounds: Normal heart sounds. No murmur. No friction rub. No gallop. Pulmonary:      Effort: Pulmonary effort is normal. No respiratory distress. Breath sounds: Normal breath sounds. No wheezing or rales. Chest:      Chest wall: No tenderness. Abdominal:      General: Bowel sounds are normal. There is no distension. Palpations: Abdomen is soft. There is no mass. Tenderness: There is no abdominal tenderness. There is no guarding or rebound. Musculoskeletal: Normal range of motion. Lymphadenopathy:      Cervical: No cervical adenopathy. Skin:     General: Skin is warm and dry. Capillary Refill: Capillary refill takes less than 2 seconds. Findings: No erythema or rash. Neurological:      Mental Status: She is alert and oriented to person, place, and time. Cranial Nerves: No cranial nerve deficit.       Coordination: Coordination normal.   Psychiatric:         Mood and Affect: Mood normal.         Behavior: Behavior normal.         Lab and Diagnostic Study Results     Labs -     Recent Results (from the past 12 hour(s))   CBC W/O DIFF    Collection Time: 01/15/21  6:30 AM   Result Value Ref Range    WBC 6.3 3.6 - 11.0 K/uL    RBC 3.69 (L) 3.80 - 5.20 M/uL    HGB 11.8 11.5 - 16.0 g/dL    HCT 34.6 (L) 35.0 - 47.0 %    MCV 93.8 80.0 - 99.0 FL    MCH 32.0 26.0 - 34.0 PG    MCHC 34.1 30.0 - 36.5 g/dL    RDW 13.8 11.5 - 14.5 %    PLATELET 593 472 - 787 K/uL    MPV 9.9 8.9 - 55.1 FL   METABOLIC PANEL, COMPREHENSIVE    Collection Time: 01/15/21  6:30 AM   Result Value Ref Range    Sodium 140 136 - 145 mmol/L    Potassium 3.6 3.5 - 5.1 mmol/L    Chloride 109 (H) 97 - 108 mmol/L    CO2 26 21 - 32 mmol/L    Anion gap 5 5 - 15 mmol/L    Glucose 99 65 - 100 mg/dL    BUN 24 (H) 6 - 20 mg/dL    Creatinine 0.75 0.55 - 1.02 mg/dL    BUN/Creatinine ratio 32 (H) 12 - 20      GFR est AA >60 >60 ml/min/1.73m2    GFR est non-AA >60 >60 ml/min/1.73m2    Calcium 8.7 8.5 - 10.1 mg/dL    Bilirubin, total 0.2 0.2 - 1.0 mg/dL    AST (SGOT) 11 (L) 15 - 37 U/L    ALT (SGPT) 18 12 - 78 U/L    Alk.  phosphatase 59 45 - 117 U/L    Protein, total 6.6 6.4 - 8.2 g/dL    Albumin 3.4 (L) 3.5 - 5.0 g/dL    Globulin 3.2 2.0 - 4.0 g/dL    A-G Ratio 1.1 1.1 - 2.2     HCG QL SERUM    Collection Time: 01/15/21  6:30 AM   Result Value Ref Range    HCG, Ql. Negative Negative     URINALYSIS W/ RFLX MICROSCOPIC    Collection Time: 01/15/21  8:21 AM   Result Value Ref Range    Color Yellow/Straw      Appearance Turbid (A) Clear      Specific gravity >1.030 (H) 1.003 - 1.030    pH (UA) 5.0 5.0 - 8.0      Protein 30 (A) Negative mg/dL    Glucose Negative Negative mg/dL    Ketone Negative Negative mg/dL    Bilirubin Negative Negative      Blood Negative Negative      Urobilinogen 0.1 0.1 - 1.0 EU/dL    Nitrites Negative Negative      Leukocyte Esterase Negative Negative      WBC 5-10 0 - 4 /hpf    RBC 0-5 0 - 5 /hpf    Bacteria Negative Negative /hpf    Mucus 4+ /lpf    Other Urine Micro 1     URINE MICROSCOPIC    Collection Time: 01/15/21  8:21 AM   Result Value Ref Range    WBC PENDING /hpf    RBC PENDING /hpf    Bacteria PENDING /hpf       Radiologic Studies -     Comparison CT abdomen pelvis demonstrating right adnexal cyst.     INDICATION: Right lower abdomen and pelvis pain.     FINDINGS: Transabdominal and transvaginal sonographic imaging of the pelvic  structures. The uterus is not identified. Right adnexal 3.9 x 3.8 x 4.3 cm  hypoechoic mass or complicated cyst with internal mostly homogeneous echotexture  and septations, with surrounding small fluid component. Arterial waveforms  obtained from the peripheral aspect of this mass, venous component obtained  centrally. The left ovary is not identified.     IMPRESSION  IMPRESSION:  1. Right adnexal 4.3 cm solid mass versus complicated cyst. No evidence of  torsion at this time. Recommend six-week follow-up pelvic ultrasound to ensure  resolution and no underlying neoplastic process. @lastxrresult@  CT Results  (Last 48 hours)               01/15/21 0743  CT ABD PELV WO CONT Final result    Impression:  IMPRESSION: 4.0 cm x 4.3 cm complex cystic structure along the right pelvic   sidewall is likely ovarian for origin. No bowel obstruction. No CT evidence for appendicitis or diverticulitis. Prior cholecystectomy. Narrative:  CT abdomen and pelvis without IV contrast.       Axial images are reviewed along with reformatted sagittal/coronal images. No IV   contrast administered. Dose reduction: All CT scans at this facility are performed using dose reduction   optimization techniques as appropriate to a performed exam including the   following-   automated exposure control, adjustments of mA and/or Kv according to patient   size, or use of iterative reconstructive technique. .       The lung bases are clear.        Nonenhanced images demonstrate unremarkable appearance for the liver. Gallbladder absent. Normal volume spleen. Pancreas, bilateral adrenal glands,   and bilateral kidneys appear unremarkable on this nonenhanced study. No   hydronephrosis. Stomach and small bowel loops are decompressed. Appendix appears unremarkable. There is stool/fluid and air present through colon. No ascites. Along the right pelvic sidewall there is a complex cystlike structure measuring   4.0 cm x 4.3 cm likely ovarian origin. CXR Results  (Last 48 hours)    None            Medical Decision Making   - I am the first provider for this patient. - I reviewed the vital signs, available nursing notes, past medical history, past surgical history, family history and social history. - Initial assessment performed. The patients presenting problems have been discussed, and they are in agreement with the care plan formulated and outlined with them. I have encouraged them to ask questions as they arise throughout their visit. Vital Signs-Reviewed the patient's vital signs. Patient Vitals for the past 12 hrs:   Temp Pulse Resp SpO2   01/15/21 0557 97.7 °F (36.5 °C) 75 18 97 %       Records Reviewed: Nursing Notes    The patient presents with abdominal pain with a differential diagnosis of abdominal pain, gastritis, gastroenteritis, ovarian cyst, torsion, renal Colic and UTI. Will assess with basic labs UA and CT of the abdomen pelvis without contrast.  Will treat with analgesic and IV fluid and reevaluate patient      ED Course:     ED Course as of Samuel 15 1025   Fri Samuel 15, 2021   0732 Patient's pain is improved. Awaiting urine and CT scan of the abdomen. Will sinus patient out to Dr. Elise Simmons.     [CS]      ED Course User Index  [CS] Lori Brunner MD       Provider Notes (Medical Decision Making):   10:24 AM  Received signout from Dr. Suraj Kohler at 7:30 AM.  CAT scan showed an ovarian cyst.  Ultrasound ordered to rule out torsion also shows the complex cyst.  Plan to discharge home with pain control. OB/GYN follow-up. Procedures         Disposition   Disposition:dc      DISCHARGE PLAN:  1. Current Discharge Medication List      CONTINUE these medications which have NOT CHANGED    Details   hydrOXYzine HCL (ATARAX) 50 mg tablet Take 1 Tab by mouth three (3) times daily as needed for Anxiety. Qty: 15 Tab, Refills: 0      Vyvanse 70 mg cap TK 1 C PO every day  Qty: 30 Cap, Refills: 0    Associated Diagnoses: ADHD (attention deficit hyperactivity disorder), combined type      citalopram (CELEXA) 40 mg tablet TAKE 1 TABLET BY MOUTH EVERY DAY  Qty: 90 Tab, Refills: 0      omeprazole (PRILOSEC) 40 mg capsule Take 40 mg by mouth daily. 2.   Follow-up Information     Follow up With Specialties Details Why Contact Info    Amari Mills NP Nurse Practitioner   [address] [place] [address]      [facility]   [address] [address]  [phone]          3. Return to ED if worse   4. Current Discharge Medication List      START taking these medications    Details   naproxen (NAPROSYN) 500 mg tablet Take 1 Tab by mouth two (2) times daily (with meals). Qty: 30 Tab, Refills: 0               Diagnosis     Clinical Impression:   1. Ovarian cyst, complex        Attestations:    Socorro Worthington MD    Please note that this dictation was completed with [employer], the computer voice recognition software. Quite often unanticipated grammatical, syntax, homophones, and other interpretive errors are inadvertently transcribed by the computer software. Please disregard these errors. Please excuse any errors that have escaped final proofreading. Thank you.

## 2021-01-15 NOTE — DISCHARGE INSTRUCTIONS
Thank you! Thank you for allowing me to care for you in the emergency department. I sincerely hope that you are satisfied with your visit today. It is my goal to provide you with excellent care. Below you will find a list of your labs and imaging from your visit today. Should you have any questions regarding these results please do not hesitate to call the emergency department. Labs -     Recent Results (from the past 12 hour(s))   CBC W/O DIFF    Collection Time: 01/15/21  6:30 AM   Result Value Ref Range    WBC 6.3 3.6 - 11.0 K/uL    RBC 3.69 (L) 3.80 - 5.20 M/uL    HGB 11.8 11.5 - 16.0 g/dL    HCT 34.6 (L) 35.0 - 47.0 %    MCV 93.8 80.0 - 99.0 FL    MCH 32.0 26.0 - 34.0 PG    MCHC 34.1 30.0 - 36.5 g/dL    RDW 13.8 11.5 - 14.5 %    PLATELET 419 743 - 454 K/uL    MPV 9.9 8.9 - 89.1 FL   METABOLIC PANEL, COMPREHENSIVE    Collection Time: 01/15/21  6:30 AM   Result Value Ref Range    Sodium 140 136 - 145 mmol/L    Potassium 3.6 3.5 - 5.1 mmol/L    Chloride 109 (H) 97 - 108 mmol/L    CO2 26 21 - 32 mmol/L    Anion gap 5 5 - 15 mmol/L    Glucose 99 65 - 100 mg/dL    BUN 24 (H) 6 - 20 mg/dL    Creatinine 0.75 0.55 - 1.02 mg/dL    BUN/Creatinine ratio 32 (H) 12 - 20      GFR est AA >60 >60 ml/min/1.73m2    GFR est non-AA >60 >60 ml/min/1.73m2    Calcium 8.7 8.5 - 10.1 mg/dL    Bilirubin, total 0.2 0.2 - 1.0 mg/dL    AST (SGOT) 11 (L) 15 - 37 U/L    ALT (SGPT) 18 12 - 78 U/L    Alk.  phosphatase 59 45 - 117 U/L    Protein, total 6.6 6.4 - 8.2 g/dL    Albumin 3.4 (L) 3.5 - 5.0 g/dL    Globulin 3.2 2.0 - 4.0 g/dL    A-G Ratio 1.1 1.1 - 2.2     HCG QL SERUM    Collection Time: 01/15/21  6:30 AM   Result Value Ref Range    HCG, Ql. Negative Negative     URINALYSIS W/ RFLX MICROSCOPIC    Collection Time: 01/15/21  8:21 AM   Result Value Ref Range    Color Yellow/Straw      Appearance Turbid (A) Clear      Specific gravity >1.030 (H) 1.003 - 1.030    pH (UA) 5.0 5.0 - 8.0      Protein 30 (A) Negative mg/dL    Glucose Negative Negative mg/dL    Ketone Negative Negative mg/dL    Bilirubin Negative Negative      Blood Negative Negative      Urobilinogen 0.1 0.1 - 1.0 EU/dL    Nitrites Negative Negative      Leukocyte Esterase Negative Negative      WBC 5-10 0 - 4 /hpf    RBC 0-5 0 - 5 /hpf    Bacteria Negative Negative /hpf    Mucus 4+ /lpf    Other Urine Micro 1     URINE MICROSCOPIC    Collection Time: 01/15/21  8:21 AM   Result Value Ref Range    WBC PENDING /hpf    RBC PENDING /hpf    Bacteria PENDING /hpf       Radiologic Studies -   US PELV NON OBS   Final Result   IMPRESSION:   1. Right adnexal 4.3 cm solid mass versus complicated cyst. No evidence of   torsion at this time. Recommend six-week follow-up pelvic ultrasound to ensure   resolution and no underlying neoplastic process. CT ABD PELV WO CONT   Final Result   IMPRESSION: 4.0 cm x 4.3 cm complex cystic structure along the right pelvic   sidewall is likely ovarian for origin. No bowel obstruction. No CT evidence for appendicitis or diverticulitis. Prior cholecystectomy. CT Results  (Last 48 hours)                 01/15/21 0743  CT ABD PELV WO CONT Final result    Impression:  IMPRESSION: 4.0 cm x 4.3 cm complex cystic structure along the right pelvic   sidewall is likely ovarian for origin. No bowel obstruction. No CT evidence for appendicitis or diverticulitis. Prior cholecystectomy. Narrative:  CT abdomen and pelvis without IV contrast.       Axial images are reviewed along with reformatted sagittal/coronal images. No IV   contrast administered. Dose reduction: All CT scans at this facility are performed using dose reduction   optimization techniques as appropriate to a performed exam including the   following-   automated exposure control, adjustments of mA and/or Kv according to patient   size, or use of iterative reconstructive technique. .       The lung bases are clear.        Nonenhanced images demonstrate unremarkable appearance for the liver. Gallbladder absent. Normal volume spleen. Pancreas, bilateral adrenal glands,   and bilateral kidneys appear unremarkable on this nonenhanced study. No   hydronephrosis. Stomach and small bowel loops are decompressed. Appendix appears unremarkable. There is stool/fluid and air present through colon. No ascites. Along the right pelvic sidewall there is a complex cystlike structure measuring   4.0 cm x 4.3 cm likely ovarian origin. CXR Results  (Last 48 hours)      None               If you feel that you have not received excellent quality care or timely care, please ask to speak to the nurse manager. Please choose us in the future for your continued health care needs. ------------------------------------------------------------------------------------------------------------  The exam and treatment you received in the Emergency Department were for an urgent problem and are not intended as complete care. It is important that you follow-up with a doctor, nurse practitioner, or physician assistant to:  (1) confirm your diagnosis,  (2) re-evaluation of changes in your illness and treatment, and  (3) for ongoing care. If your symptoms become worse or you do not improve as expected and you are unable to reach your usual health care provider, you should return to the Emergency Department. We are available 24 hours a day. Please take your discharge instructions with you when you go to your follow-up appointment. If you have any problem arranging a follow-up appointment, contact the Emergency Department immediately. If a prescription has been provided, please have it filled as soon as possible to prevent a delay in treatment. Read the entire medication instruction sheet provided to you by the pharmacy.  If you have any questions or reservations about taking the medication due to side effects or interactions with other medications, please call your primary care physician or contact the ER to speak with the charge nurse. Make an appointment with your family doctor or the physician you were referred to for follow-up of this visit as instructed on your discharge paperwork, as this is a mandatory follow-up. Return to the ER if you are unable to be seen or if you are unable to be seen in a timely manner. If you have any problem arranging the follow-up visit, contact the Emergency Department immediately.

## 2021-02-01 DIAGNOSIS — F41.1 GENERALIZED ANXIETY DISORDER: Primary | ICD-10-CM

## 2021-02-01 DIAGNOSIS — F90.2 ADHD (ATTENTION DEFICIT HYPERACTIVITY DISORDER), COMBINED TYPE: ICD-10-CM

## 2021-02-02 RX ORDER — HYDROXYZINE 50 MG/1
50 TABLET, FILM COATED ORAL
Qty: 15 TAB | Refills: 1 | Status: SHIPPED | OUTPATIENT
Start: 2021-02-02 | End: 2021-03-28 | Stop reason: SDUPTHER

## 2021-02-02 RX ORDER — LISDEXAMFETAMINE DIMESYLATE 70 MG/1
CAPSULE ORAL
Qty: 30 CAP | Refills: 0 | Status: SHIPPED | OUTPATIENT
Start: 2021-02-03 | End: 2021-02-28 | Stop reason: SDUPTHER

## 2021-02-28 DIAGNOSIS — F90.2 ADHD (ATTENTION DEFICIT HYPERACTIVITY DISORDER), COMBINED TYPE: ICD-10-CM

## 2021-03-02 RX ORDER — CITALOPRAM 40 MG/1
40 TABLET, FILM COATED ORAL DAILY
Qty: 90 TAB | Refills: 3 | Status: SHIPPED | OUTPATIENT
Start: 2021-03-02 | End: 2022-01-09 | Stop reason: SDUPTHER

## 2021-03-02 RX ORDER — LISDEXAMFETAMINE DIMESYLATE 70 MG/1
CAPSULE ORAL
Qty: 30 CAP | Refills: 0 | Status: SHIPPED | OUTPATIENT
Start: 2021-03-02 | End: 2021-03-28 | Stop reason: SDUPTHER

## 2021-03-28 DIAGNOSIS — F41.1 GENERALIZED ANXIETY DISORDER: ICD-10-CM

## 2021-03-28 DIAGNOSIS — F90.2 ADHD (ATTENTION DEFICIT HYPERACTIVITY DISORDER), COMBINED TYPE: ICD-10-CM

## 2021-03-30 RX ORDER — HYDROXYZINE 50 MG/1
50 TABLET, FILM COATED ORAL
Qty: 15 TAB | Refills: 1 | Status: SHIPPED | OUTPATIENT
Start: 2021-03-30 | End: 2021-04-25 | Stop reason: SDUPTHER

## 2021-03-30 RX ORDER — LISDEXAMFETAMINE DIMESYLATE 70 MG/1
CAPSULE ORAL
Qty: 30 CAP | Refills: 0 | Status: SHIPPED | OUTPATIENT
Start: 2021-03-30 | End: 2021-04-25 | Stop reason: SDUPTHER

## 2021-04-25 DIAGNOSIS — F90.2 ADHD (ATTENTION DEFICIT HYPERACTIVITY DISORDER), COMBINED TYPE: ICD-10-CM

## 2021-04-25 DIAGNOSIS — F41.1 GENERALIZED ANXIETY DISORDER: ICD-10-CM

## 2021-04-27 RX ORDER — HYDROXYZINE 50 MG/1
50 TABLET, FILM COATED ORAL
Qty: 15 TAB | Refills: 1 | Status: SHIPPED | OUTPATIENT
Start: 2021-04-27 | End: 2021-05-23 | Stop reason: SDUPTHER

## 2021-04-27 RX ORDER — LISDEXAMFETAMINE DIMESYLATE 70 MG/1
CAPSULE ORAL
Qty: 30 CAP | Refills: 0 | Status: SHIPPED | OUTPATIENT
Start: 2021-04-27 | End: 2021-05-23 | Stop reason: SDUPTHER

## 2021-05-23 DIAGNOSIS — F90.2 ADHD (ATTENTION DEFICIT HYPERACTIVITY DISORDER), COMBINED TYPE: ICD-10-CM

## 2021-05-23 DIAGNOSIS — F41.1 GENERALIZED ANXIETY DISORDER: ICD-10-CM

## 2021-05-24 RX ORDER — HYDROXYZINE 50 MG/1
50 TABLET, FILM COATED ORAL
Qty: 10 TABLET | Refills: 1 | Status: SHIPPED | OUTPATIENT
Start: 2021-05-25 | End: 2021-07-19 | Stop reason: SDUPTHER

## 2021-05-24 RX ORDER — LISDEXAMFETAMINE DIMESYLATE 70 MG/1
CAPSULE ORAL
Qty: 30 CAPSULE | Refills: 0 | Status: SHIPPED | OUTPATIENT
Start: 2021-05-25 | End: 2021-06-20 | Stop reason: SDUPTHER

## 2021-06-03 ENCOUNTER — OFFICE VISIT (OUTPATIENT)
Dept: FAMILY MEDICINE CLINIC | Age: 41
End: 2021-06-03
Payer: COMMERCIAL

## 2021-06-03 VITALS
DIASTOLIC BLOOD PRESSURE: 82 MMHG | WEIGHT: 191.38 LBS | HEART RATE: 101 BPM | OXYGEN SATURATION: 97 % | TEMPERATURE: 97.5 F | SYSTOLIC BLOOD PRESSURE: 128 MMHG | BODY MASS INDEX: 31.89 KG/M2 | HEIGHT: 65 IN | RESPIRATION RATE: 16 BRPM

## 2021-06-03 DIAGNOSIS — F90.2 ADHD (ATTENTION DEFICIT HYPERACTIVITY DISORDER), COMBINED TYPE: Primary | ICD-10-CM

## 2021-06-03 DIAGNOSIS — F41.1 GENERALIZED ANXIETY DISORDER: ICD-10-CM

## 2021-06-03 DIAGNOSIS — E66.09 CLASS 1 OBESITY DUE TO EXCESS CALORIES WITH SERIOUS COMORBIDITY AND BODY MASS INDEX (BMI) OF 31.0 TO 31.9 IN ADULT: ICD-10-CM

## 2021-06-03 PROCEDURE — 99214 OFFICE O/P EST MOD 30 MIN: CPT | Performed by: NURSE PRACTITIONER

## 2021-06-03 NOTE — PATIENT INSTRUCTIONS
Starting a Weight Loss Plan: Care Instructions  Overview     If you're thinking about losing weight, it can be hard to know where to start. Your doctor can help you set up a weight loss plan that best meets your needs. You may want to take a class on nutrition or exercise, or you could join a weight loss support group. If you have questions about how to make changes to your eating or exercise habits, ask your doctor about seeing a registered dietitian or an exercise specialist.  It can be a big challenge to lose weight. But you don't have to make huge changes at once. Make small changes, and stick with them. When those changes become habit, add a few more changes. If you don't think you're ready to make changes right now, try to pick a date in the future. Make an appointment to see your doctor to discuss whether the time is right for you to start a plan. Follow-up care is a key part of your treatment and safety. Be sure to make and go to all appointments, and call your doctor if you are having problems. It's also a good idea to know your test results and keep a list of the medicines you take. How can you care for yourself at home? · Set realistic goals. Many people expect to lose much more weight than is likely. A weight loss of 5% to 10% of your body weight may be enough to improve your health. · Get family and friends involved to provide support. Talk to them about why you are trying to lose weight, and ask them to help. They can help by participating in exercise and having meals with you, even if they may be eating something different. · Find what works best for you. If you do not have time or do not like to cook, a program that offers meal replacement bars or shakes may be better for you. Or if you like to prepare meals, finding a plan that includes daily menus and recipes may be best.  · Ask your doctor about other health professionals who can help you achieve your weight loss goals.   ? A dietitian can help you make healthy changes in your diet. ? An exercise specialist or  can help you develop a safe and effective exercise program.  ? A counselor or psychiatrist can help you cope with issues such as depression, anxiety, or family problems that can make it hard to focus on weight loss. · Consider joining a support group for people who are trying to lose weight. Your doctor can suggest groups in your area. Where can you learn more? Go to http://www.gray.com/  Enter U357 in the search box to learn more about \"Starting a Weight Loss Plan: Care Instructions. \"  Current as of: September 23, 2020               Content Version: 12.8  © 1901-5635 [employer]wise, [employer]. Care instructions adapted under license by [employer] (which disclaims liability or warranty for this information). If you have questions about a medical condition or this instruction, always ask your healthcare professional. [address] any warranty or liability for your use of this information.

## 2021-06-03 NOTE — PROGRESS NOTES
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 Patient presents for management of ADHD and anxiety. Also interested in Contrave for weight loss. Work Performance: not working  Organization: good  Appetite: normal, reports binge eating in the past but denies recent binge eating, weight is stable  Mood: stable  Sleep: good  Friends: well connected with peers  Family: house maintenance issues  Self esteem: high    Past Medical History:   Diagnosis Date    ADHD (attention deficit hyperactivity disorder)     Anxiety     Asthma     CHILDHOOD    Chronic back pain     Chronic pain     DISC ISSUES IN BACK    PTSD (post-traumatic stress disorder)      Family History   Problem Relation Age of Onset    Anxiety Mother     Hypertension Mother     COPD Mother     Arthritis-osteo Mother     Psychiatric Disorder Mother     Hypertension Father     Cancer Brother     Schizophrenia Maternal Aunt     Heart Disease Maternal Grandfather     Stroke Maternal Grandfather     Cancer Paternal Grandmother     Anesth Problems Neg Hx      Social History     Socioeconomic History    Marital status: LEGALLY      Spouse name: Not on file    Number of children: Not on file    Years of education: Not on file    Highest education level: Not on file   Occupational History    Not on file   Tobacco Use    Smoking status: Former Smoker     Packs/day: 0.25     Years: 19.00     Pack years: 4.75     Quit date: 2019     Years since quittin.0    Smokeless tobacco: Current User    Tobacco comment: smokes vapor cig    Vaping Use    Vaping Use: Every day    Substances: Nicotine (lowest dose)    Devices: Pre-filled pod   Substance and Sexual Activity    Alcohol use: Yes     Alcohol/week: 0.0 standard drinks     Comment: Socially.      Drug use: No    Sexual activity: Yes     Partners: Male     Birth control/protection: Surgical   Other Topics Concern    Not on file   Social History Narrative    Not on file     Social Determinants of Health     Financial Resource Strain:     Difficulty of Paying Living Expenses:    Food Insecurity:     Worried About Running Out of Food in the Last Year:     [address] in the Last Year:    Transportation Needs:     Lack of Transportation (Medical):  Lack of Transportation (Non-Medical):    Physical Activity:     Days of Exercise per Week:     Minutes of Exercise per Session:    Stress:     Feeling of Stress :    Social Connections:     Frequency of Communication with Friends and Family:     Frequency of Social Gatherings with Friends and Family:     Attends Jainism Services:     Active Member of Clubs or Organizations:     Attends Club or Organization Meetings:     Marital Status:    Intimate Partner Violence:     Fear of Current or Ex-Partner:     Emotionally Abused:     Physically Abused:     Sexually Abused:      Current Outpatient Medications on File Prior to Visit   Medication Sig Dispense Refill    hydrOXYzine HCL (ATARAX) 50 mg tablet Take 1 Tablet by mouth three (3) times daily as needed for Anxiety. 10 Tablet 1    Vyvanse 70 mg cap TK 1 C PO every day 30 Capsule 0    citalopram (CELEXA) 40 mg tablet Take 1 Tab by mouth daily. 90 Tab 3    naproxen (NAPROSYN) 500 mg tablet Take 1 Tab by mouth two (2) times daily (with meals). 30 Tab 0    [DISCONTINUED] omeprazole (PRILOSEC) 40 mg capsule Take 40 mg by mouth daily. (Patient not taking: Reported on 6/3/2021)       No current facility-administered medications on file prior to visit. Allergies   Allergen Reactions    Other Food Itching     Fresh fruit, cannot be specific    Codeine Hives     Allergic to tylenol with codeine combo    Tylenol-Codeine #2 Hives    Nuts [Tree Nut] Itching     ROS  See HPI for pertinent ROS.     Objective  Visit Vitals  /82 (BP 1 Location: Left upper arm, BP Patient Position: Sitting)   Pulse (!) 101   Temp 97.5 °F (36.4 °C) (Temporal)   Resp 16   Ht 5' 5\" (1.651 m)   Wt 191 lb 6 oz (86.8 kg)   SpO2 97%   BMI 31.85 kg/m²       Physical Exam  Vitals and nursing note reviewed. Constitutional:       General: She is not in acute distress. Appearance: Normal appearance. She is obese. HENT:      Head: Normocephalic. Eyes:      Extraocular Movements: Extraocular movements intact. Cardiovascular:      Rate and Rhythm: Normal rate and regular rhythm. Heart sounds: Normal heart sounds. Pulmonary:      Effort: Pulmonary effort is normal.      Breath sounds: Normal breath sounds. Musculoskeletal:         General: Normal range of motion. Right lower leg: No edema. Left lower leg: No edema. Skin:     General: Skin is warm and dry. Neurological:      Mental Status: She is alert and oriented to person, place, and time. Psychiatric:         Mood and Affect: Mood normal.         Behavior: Behavior normal.          Assessment & Plan      ICD-10-CM ICD-9-CM    1. ADHD (attention deficit hyperactivity disorder), combined type  F90.2 314.01    2. Generalized anxiety disorder  F41.1 300.02    3. Class 1 obesity due to excess calories with serious comorbidity and body mass index (BMI) of 31.0 to 31.9 in adult  E66.09 278.00     Z68.31 V85.31      Diagnoses and all orders for this visit:    1. ADHD (attention deficit hyperactivity disorder), combined type  ADHD remains well controlled with Vyvanse daily. 2. Generalized anxiety disorder  Anxiety remains well controlled with Celexa daily and hydroxyzine as needed. Take medication daily and do not abruptly discontinue. 3. Class 1 obesity due to excess calories with serious comorbidity and body mass index (BMI) of 31.0 to 31.9 in adult  We discussed healthy diet and exercise recommendations. Before beginning weight loss meds, she will keep a log for the next month of everything she eats and exercise.   Understands medications are prescribed to be used in conjunction with positive lifestyle changes. She will bring logs to follow-up appointment in 1 month and will verify coverage of medication with insurance as well. Follow-up and Dispositions    · Return in about 1 month (around 7/3/2021) for follow up, weight (discuss meds).            Shawanda Del Castillo NP

## 2021-06-04 ENCOUNTER — HOSPITAL ENCOUNTER (EMERGENCY)
Age: 41
Discharge: HOME | End: 2021-06-04
Attending: EMERGENCY MEDICINE
Payer: COMMERCIAL

## 2021-06-04 VITALS
RESPIRATION RATE: 22 BRPM | OXYGEN SATURATION: 98 % | TEMPERATURE: 97.7 F | SYSTOLIC BLOOD PRESSURE: 118 MMHG | DIASTOLIC BLOOD PRESSURE: 82 MMHG | WEIGHT: 190 LBS | HEART RATE: 88 BPM | BODY MASS INDEX: 31.65 KG/M2 | HEIGHT: 65 IN

## 2021-06-04 DIAGNOSIS — K08.89 PAIN, DENTAL: Primary | ICD-10-CM

## 2021-06-04 PROCEDURE — 99283 EMERGENCY DEPT VISIT LOW MDM: CPT

## 2021-06-04 PROCEDURE — 74011250637 HC RX REV CODE- 250/637: Performed by: EMERGENCY MEDICINE

## 2021-06-04 RX ORDER — PENICILLIN V POTASSIUM 500 MG/1
500 TABLET, FILM COATED ORAL 4 TIMES DAILY
Qty: 40 TABLET | Refills: 0 | Status: SHIPPED | OUTPATIENT
Start: 2021-06-04 | End: 2021-08-15

## 2021-06-04 RX ORDER — TRAMADOL HYDROCHLORIDE 50 MG/1
50 TABLET ORAL
Status: COMPLETED | OUTPATIENT
Start: 2021-06-04 | End: 2021-06-04

## 2021-06-04 RX ORDER — PENICILLIN V POTASSIUM 250 MG/1
500 TABLET, FILM COATED ORAL
Status: COMPLETED | OUTPATIENT
Start: 2021-06-04 | End: 2021-06-04

## 2021-06-04 RX ORDER — NAPROXEN 500 MG/1
500 TABLET ORAL
Qty: 20 TABLET | Refills: 0 | Status: SHIPPED | OUTPATIENT
Start: 2021-06-04 | End: 2021-08-15

## 2021-06-04 RX ADMIN — TRAMADOL HYDROCHLORIDE 50 MG: 50 TABLET, FILM COATED ORAL at 04:05

## 2021-06-04 RX ADMIN — PENICILLIN V POTASIUM 500 MG: 250 TABLET ORAL at 04:05

## 2021-06-04 NOTE — ED TRIAGE NOTES
Right upper gum area swollen and painful since yesterday.  Had a tooth removed from that spot about 6 weeks ago

## 2021-06-04 NOTE — ED PROVIDER NOTES
EMERGENCY DEPARTMENT HISTORY AND PHYSICAL EXAM      Date: 6/4/2021  Patient Name: Leon Hall    History of Presenting Illness     Chief Complaint   Patient presents with    Dental Pain       History Provided By: Patient    HPI: Leon Hall, 36 y.o. female with a past medical history significant Dental caries presents to the ED with cc of right upper gum pains been going on for the past couple of days progressively getting worse she says her some mild redness after having a tooth pulled she is concerned it might be infected. She denies any fever, chills, nausea, vomiting, chest pain, shortness of breath, rash, diarrhea, headache, night sweats. She says the pain is an aching pain that radiates up into her jaw made worse with cold liquids. There are no other complaints, changes, or physical findings at this time. PCP: Wu Alonzo NP    No current facility-administered medications on file prior to encounter. Current Outpatient Medications on File Prior to Encounter   Medication Sig Dispense Refill    hydrOXYzine HCL (ATARAX) 50 mg tablet Take 1 Tablet by mouth three (3) times daily as needed for Anxiety. 10 Tablet 1    Vyvanse 70 mg cap TK 1 C PO every day 30 Capsule 0    citalopram (CELEXA) 40 mg tablet Take 1 Tab by mouth daily. 90 Tab 3    [DISCONTINUED] naproxen (NAPROSYN) 500 mg tablet Take 1 Tab by mouth two (2) times daily (with meals).  30 Tab 0       Past History     Past Medical History:  Past Medical History:   Diagnosis Date    ADHD (attention deficit hyperactivity disorder)     Anxiety     Asthma     CHILDHOOD    Chronic back pain     Chronic pain     DISC ISSUES IN BACK    PTSD (post-traumatic stress disorder)        Past Surgical History:  Past Surgical History:   Procedure Laterality Date    HX BREAST REDUCTION  2002    HX CHOLECYSTECTOMY      HX CYST REMOVAL Right     ganglion cyst removed from right wrist    HX GYN  2012    BTL    HX GYN      D&C X 2    HX GYN  2015 Hysterectomy    HX OTHER SURGICAL Right 2013    Ganglion cyst removal from wrist    HX OTHER SURGICAL      rectal surgery    HX TONSIL AND ADENOIDECTOMY      HX WISDOM TEETH EXTRACTION         Family History:  Family History   Problem Relation Age of Onset    Anxiety Mother     Hypertension Mother     COPD Mother     Arthritis-osteo Mother     Psychiatric Disorder Mother     Hypertension Father     Cancer Brother     Schizophrenia Maternal Aunt     Heart Disease Maternal Grandfather     Stroke Maternal Grandfather     Cancer Paternal Grandmother     Anesth Problems Neg Hx        Social History:  Social History     Tobacco Use    Smoking status: Former Smoker     Packs/day: 0.25     Years: 19.00     Pack years: 4.75     Quit date: 2019     Years since quittin.0    Smokeless tobacco: Current User    Tobacco comment: smokes vapor cig    Vaping Use    Vaping Use: Every day    Substances: Nicotine (lowest dose)    Devices: Pre-filled pod   Substance Use Topics    Alcohol use: Yes     Alcohol/week: 0.0 standard drinks     Comment: Socially.  Drug use: No       Allergies: Allergies   Allergen Reactions    Other Food Itching     Fresh fruit, cannot be specific    Codeine Hives     Allergic to tylenol with codeine combo    Tylenol-Codeine #2 Hives    Nuts [Tree Nut] Itching         Review of Systems     Review of Systems   Constitutional: Negative. Negative for appetite change, chills, fatigue and fever. HENT: Positive for dental problem. Negative for congestion and sinus pain. Eyes: Negative. Negative for pain and visual disturbance. Respiratory: Negative. Negative for chest tightness and shortness of breath. Cardiovascular: Negative. Negative for chest pain. Gastrointestinal: Negative. Negative for abdominal pain, diarrhea, nausea and vomiting. Genitourinary: Negative. Negative for difficulty urinating. No discharge   Musculoskeletal: Negative. Negative for arthralgias. Skin: Negative. Negative for rash. Neurological: Negative. Negative for weakness and headaches. Hematological: Negative. Psychiatric/Behavioral: Negative. Negative for agitation. The patient is not nervous/anxious. All other systems reviewed and are negative. Physical Exam     Physical Exam  Vitals and nursing note reviewed. Constitutional:       General: She is not in acute distress. Appearance: She is well-developed. HENT:      Head: Normocephalic and atraumatic. Nose: Nose normal.      Mouth/Throat:      Mouth: Mucous membranes are moist.      Pharynx: Oropharynx is clear. No oropharyngeal exudate. Comments: Mild diffuse dental caries with erythema on right upper gum  Eyes:      General:         Right eye: No discharge. Left eye: No discharge. Conjunctiva/sclera: Conjunctivae normal.      Pupils: Pupils are equal, round, and reactive to light. Cardiovascular:      Rate and Rhythm: Normal rate and regular rhythm. Chest Wall: PMI is not displaced. No thrill. Heart sounds: Normal heart sounds. No murmur heard. No friction rub. No gallop. Pulmonary:      Effort: Pulmonary effort is normal. No respiratory distress. Breath sounds: Normal breath sounds. No wheezing or rales. Chest:      Chest wall: No tenderness. Abdominal:      General: Bowel sounds are normal. There is no distension. Palpations: Abdomen is soft. There is no mass. Tenderness: There is no abdominal tenderness. There is no guarding or rebound. Musculoskeletal:         General: Normal range of motion. Cervical back: Normal range of motion and neck supple. Lymphadenopathy:      Cervical: No cervical adenopathy. Skin:     General: Skin is warm and dry. Capillary Refill: Capillary refill takes less than 2 seconds. Findings: No erythema or rash.    Neurological:      Mental Status: She is alert and oriented to person, place, and time.      Cranial Nerves: No cranial nerve deficit. Coordination: Coordination normal.   Psychiatric:         Mood and Affect: Mood normal.         Behavior: Behavior normal.         Lab and Diagnostic Study Results     Labs -   No results found for this or any previous visit (from the past 12 hour(s)). Radiologic Studies -   @lastxrresult@  CT Results  (Last 48 hours)    None        CXR Results  (Last 48 hours)    None            Medical Decision Making   - I am the first provider for this patient. - I reviewed the vital signs, available nursing notes, past medical history, past surgical history, family history and social history. - Initial assessment performed. The patients presenting problems have been discussed, and they are in agreement with the care plan formulated and outlined with them. I have encouraged them to ask questions as they arise throughout their visit. Vital Signs-Reviewed the patient's vital signs. Patient Vitals for the past 12 hrs:   Temp Pulse Resp BP SpO2   06/04/21 0323 97.7 °F (36.5 °C) 88 22 118/82 98 %       Records Reviewed: Nursing Notes        ED Course:          Provider Notes (Medical Decision Making): MDM       Procedures   Medical Decision Makingedical Decision Making  Performed by: Crystal Childress MD  PROCEDURES:  Procedures       Disposition   Disposition: Condition stable    Discharged    DISCHARGE PLAN:  1. Current Discharge Medication List      CONTINUE these medications which have NOT CHANGED    Details   hydrOXYzine HCL (ATARAX) 50 mg tablet Take 1 Tablet by mouth three (3) times daily as needed for Anxiety. Qty: 10 Tablet, Refills: 1    Associated Diagnoses: Generalized anxiety disorder      Vyvanse 70 mg cap TK 1 C PO every day  Qty: 30 Capsule, Refills: 0    Associated Diagnoses: ADHD (attention deficit hyperactivity disorder), combined type      citalopram (CELEXA) 40 mg tablet Take 1 Tab by mouth daily.   Qty: 90 Tab, Refills: 3 naproxen (NAPROSYN) 500 mg tablet Take 1 Tab by mouth two (2) times daily (with meals). Qty: 30 Tab, Refills: 0           2. Follow-up Information     Follow up With Specialties Details Why [address] [facility] Call in 2 days  [address] [facility] [id number] [phone]        3. Return to ED if worse   4. Current Discharge Medication List      START taking these medications    Details   penicillin v potassium (VEETID) 500 mg tablet Take 1 Tablet by mouth four (4) times daily. Qty: 40 Tablet, Refills: 0  Start date: 6/4/2021      naproxen (NAPROSYN) 500 mg tablet Take 1 Tablet by mouth every twelve (12) hours as needed for Pain. Qty: 20 Tablet, Refills: 0  Start date: 6/4/2021               Diagnosis     Clinical Impression:   1. Pain, dental        Attestations:    Rafi Owens MD    Please note that this dictation was completed with [email or web address], the computer voice recognition software. Quite often unanticipated grammatical, syntax, homophones, and other interpretive errors are inadvertently transcribed by the computer software. Please disregard these errors. Please excuse any errors that have escaped final proofreading. Thank you.

## 2021-06-20 DIAGNOSIS — F90.2 ADHD (ATTENTION DEFICIT HYPERACTIVITY DISORDER), COMBINED TYPE: ICD-10-CM

## 2021-06-22 RX ORDER — LISDEXAMFETAMINE DIMESYLATE 70 MG/1
CAPSULE ORAL
Qty: 30 CAPSULE | Refills: 0 | Status: SHIPPED | OUTPATIENT
Start: 2021-06-22 | End: 2021-07-19 | Stop reason: SDUPTHER

## 2021-07-19 DIAGNOSIS — F90.2 ADHD (ATTENTION DEFICIT HYPERACTIVITY DISORDER), COMBINED TYPE: ICD-10-CM

## 2021-07-19 DIAGNOSIS — F41.1 GENERALIZED ANXIETY DISORDER: ICD-10-CM

## 2021-07-19 RX ORDER — LISDEXAMFETAMINE DIMESYLATE 70 MG/1
CAPSULE ORAL
Qty: 30 CAPSULE | Refills: 0 | Status: SHIPPED | OUTPATIENT
Start: 2021-07-20 | End: 2021-08-16 | Stop reason: SDUPTHER

## 2021-07-19 RX ORDER — HYDROXYZINE 50 MG/1
50 TABLET, FILM COATED ORAL
Qty: 10 TABLET | Refills: 1 | Status: SHIPPED | OUTPATIENT
Start: 2021-07-20 | End: 2021-10-12 | Stop reason: SDUPTHER

## 2021-08-04 ENCOUNTER — PATIENT MESSAGE (OUTPATIENT)
Dept: FAMILY MEDICINE CLINIC | Age: 41
End: 2021-08-04

## 2021-08-04 DIAGNOSIS — H10.30 ACUTE CONJUNCTIVITIS, UNSPECIFIED ACUTE CONJUNCTIVITIS TYPE, UNSPECIFIED LATERALITY: Primary | ICD-10-CM

## 2021-08-04 NOTE — TELEPHONE ENCOUNTER
From: Radu Harris  To: Diego Levine NP  Sent: 8/4/2021 8:54 AM EDT  Subject: Prescription Question    Good morning. I woke up this morning with pink eye. Having 3 kids and getting it from them many times over the years I am pretty confident it's pink eye. I was wondering if it was possible to send a prescription for eye drops to my pharmacy without having to make an appointment? Thanks so much!

## 2021-08-05 RX ORDER — POLYMYXIN B SULFATE AND TRIMETHOPRIM 1; 10000 MG/ML; [USP'U]/ML
1 SOLUTION OPHTHALMIC EVERY 4 HOURS
Qty: 1 BOTTLE | Refills: 0 | Status: SHIPPED | OUTPATIENT
Start: 2021-08-05 | End: 2021-08-15

## 2021-08-15 ENCOUNTER — HOSPITAL ENCOUNTER (EMERGENCY)
Age: 41
Discharge: HOME | End: 2021-08-15
Attending: STUDENT IN AN ORGANIZED HEALTH CARE EDUCATION/TRAINING PROGRAM
Payer: COMMERCIAL

## 2021-08-15 VITALS
BODY MASS INDEX: 32.49 KG/M2 | HEIGHT: 65 IN | SYSTOLIC BLOOD PRESSURE: 132 MMHG | OXYGEN SATURATION: 98 % | WEIGHT: 195 LBS | DIASTOLIC BLOOD PRESSURE: 83 MMHG | TEMPERATURE: 98.7 F | RESPIRATION RATE: 18 BRPM | HEART RATE: 97 BPM

## 2021-08-15 DIAGNOSIS — M27.3 DRY SOCKET: Primary | ICD-10-CM

## 2021-08-15 PROCEDURE — 99282 EMERGENCY DEPT VISIT SF MDM: CPT

## 2021-08-15 RX ORDER — AMOXICILLIN 500 MG/1
CAPSULE ORAL
COMMUNITY
Start: 2021-08-11 | End: 2021-10-05 | Stop reason: ALTCHOICE

## 2021-08-15 RX ORDER — IBUPROFEN 800 MG/1
TABLET ORAL
COMMUNITY
Start: 2021-08-11 | End: 2021-10-05 | Stop reason: SDUPTHER

## 2021-08-15 RX ORDER — ACETAMINOPHEN 500 MG
500 TABLET ORAL
COMMUNITY

## 2021-08-15 RX ORDER — OXYCODONE HYDROCHLORIDE 5 MG/1
5 TABLET ORAL
Qty: 5 TABLET | Refills: 0 | Status: SHIPPED | OUTPATIENT
Start: 2021-08-15 | End: 2021-08-16

## 2021-08-15 NOTE — ED PROVIDER NOTES
EMERGENCY DEPARTMENT HISTORY AND PHYSICAL EXAM      Date: 8/15/2021  Patient Name: Edgar Blair      History of Presenting Illness     Chief Complaint   Patient presents with    Dental Pain     Recent extraction       History Provided By: Patient    HPI: Edgar Blair, 36 y.o. female with no chronic past medical history of note presents emergency department with tooth pain. Patient reports that she had 2 teeth extracted on Wednesday and presenting because she started developing pain. Patient reports that she was drinking from a straw and that may have triggered her pain. Because the weekend the patient was unable to see her dentist.  Currently denies any fever, night sweats or chills. Denies any dysphagia and dyspnea. There are no other complaints, changes, or physical findings at this time. PCP: Faraz Hagan NP    Current Outpatient Medications   Medication Sig Dispense Refill    acetaminophen (Tylenol Extra Strength) 500 mg tablet Take 500 mg by mouth every six (6) hours as needed for Pain. OTC      oxyCODONE IR (Roxicodone) 5 mg immediate release tablet Take 1 Tablet by mouth every eight (8) hours as needed for Pain for up to 5 doses. Max Daily Amount: 15 mg. 5 Tablet 0    amoxicillin (AMOXIL) 500 mg capsule TAKE ONE CAPSULE BY MOUTH THREE TIMES DAILY UNTIL ALL TAKEN.  ibuprofen (MOTRIN) 800 mg tablet TAKE 1 TABLET BY MOUTH EVERY 6 HOURS AS NEEDED FOR PAIN. TAKE WITH FOOD.  hydrOXYzine HCL (ATARAX) 50 mg tablet Take 1 Tablet by mouth three (3) times daily as needed for Anxiety. 10 Tablet 1    Vyvanse 70 mg cap TK 1 C PO every day 30 Capsule 0    citalopram (CELEXA) 40 mg tablet Take 1 Tab by mouth daily.  80 Tab 3       Past History     Past Medical History:  Past Medical History:   Diagnosis Date    ADHD (attention deficit hyperactivity disorder)     Anxiety     Asthma     CHILDHOOD    Chronic back pain     Chronic pain     DISC ISSUES IN BACK    PTSD (post-traumatic stress disorder)        Past Surgical History:  Past Surgical History:   Procedure Laterality Date    HX BREAST REDUCTION  2002    HX CHOLECYSTECTOMY      HX CYST REMOVAL Right     ganglion cyst removed from right wrist    HX GYN  2012    BTL    HX GYN      D&C X 2    HX GYN  2015    Hysterectomy    HX HYSTERECTOMY      HX OTHER SURGICAL Right 2013    Ganglion cyst removal from wrist    HX OTHER SURGICAL      rectal surgery    HX TONSIL AND ADENOIDECTOMY      HX WISDOM TEETH EXTRACTION         Family History:  Family History   Problem Relation Age of Onset    Anxiety Mother     Hypertension Mother     COPD Mother     Arthritis-osteo Mother     Psychiatric Disorder Mother     Hypertension Father     Cancer Brother     Schizophrenia Maternal Aunt     Heart Disease Maternal Grandfather     Stroke Maternal Grandfather     Cancer Paternal Grandmother     Anesth Problems Neg Hx        Social History:  Social History     Tobacco Use    Smoking status: Former Smoker     Packs/day: 0.25     Years: 19.00     Pack years: 4.75     Quit date: 2019     Years since quittin.2    Smokeless tobacco: Current User    Tobacco comment: smokes vapor cig    Vaping Use    Vaping Use: Every day    Substances: Nicotine (lowest dose)    Devices: Pre-filled pod   Substance Use Topics    Alcohol use: Yes     Alcohol/week: 0.0 standard drinks     Comment: Socially.  Drug use: No       Allergies: Allergies   Allergen Reactions    Other Food Itching     Fresh fruit, cannot be specific    Codeine Hives     Allergic to tylenol with codeine combo    Tylenol-Codeine #2 Hives    Nuts [Tree Nut] Itching         Review of Systems     Review of Systems   Constitutional: Negative for activity change, chills and fever. HENT: Positive for dental problem. Negative for congestion and facial swelling. Eyes: Negative for discharge and visual disturbance.    Respiratory: Negative for chest tightness and shortness of breath. Cardiovascular: Negative for chest pain and leg swelling. Gastrointestinal: Negative for abdominal pain, diarrhea and vomiting. Genitourinary: Negative for dysuria and flank pain. Musculoskeletal: Negative for back pain and gait problem. Skin: Negative for rash and wound. Neurological: Negative for dizziness, weakness and headaches. Physical Exam     Physical Exam  Constitutional:       Appearance: Normal appearance. HENT:      Head: Normocephalic and atraumatic. Mouth/Throat:      Mouth: Mucous membranes are moist.      Pharynx: Oropharynx is clear. Comments: Dry sockets of those extracted teeth with poor dentition. Floor the mouth is soft. Eyes:      Extraocular Movements: Extraocular movements intact. Conjunctiva/sclera: Conjunctivae normal.   Cardiovascular:      Rate and Rhythm: Normal rate and regular rhythm. Pulmonary:      Effort: Pulmonary effort is normal.      Breath sounds: Normal breath sounds. Abdominal:      General: Abdomen is flat. Palpations: Abdomen is soft. Tenderness: There is no abdominal tenderness. Musculoskeletal:         General: No tenderness or deformity. Cervical back: Normal range of motion and neck supple. Skin:     General: Skin is warm and dry. Neurological:      General: No focal deficit present. Mental Status: She is alert and oriented to person, place, and time. Lab and Diagnostic Study Results     Labs -   No results found for this or any previous visit (from the past 12 hour(s)). Radiologic Studies -   [unfilled]  CT Results  (Last 48 hours)    None        CXR Results  (Last 48 hours)    None          Medical Decision Making and ED Course   - I am the first and primary provider for this patient AND AM THE PRIMARY PROVIDER OF RECORD. - I reviewed the vital signs, available nursing notes, past medical history, past surgical history, family history and social history.     - Initial assessment performed. The patients presenting problems have been discussed, and the staff are in agreement with the care plan formulated and outlined with them. I have encouraged them to ask questions as they arise throughout their visit. Vital Signs-Reviewed the patient's vital signs. Patient Vitals for the past 12 hrs:   Temp Pulse Resp BP SpO2   08/15/21 1338 98.7 °F (37.1 °C) 97 18 132/83 98 %         Records Reviewed: Nursing Notes    Provider Notes (Medical Decision Making):   70-year-old female presenting with dental pain due to dry sockets. Currently she is taking amoxicillin. Her current pain medication at home is Tylenol ibuprofen with Orajel. Patient's pain is not adequately controlled. We will add 2 days worth of oxycodone to maximize her pain control and she will follow-up with her dentist tomorrow. Patient does have clear airway and there is no airway compromise and she is nontoxic-appearing. Disposition     Disposition: Condition stable  DC- Adult Discharges: All of the diagnostic tests were reviewed and questions answered. Diagnosis, care plan and treatment options were discussed. The patient understands the instructions and will follow up as directed. The patients results have been reviewed with them. They have been counseled regarding their diagnosis. The patient verbally convey understanding and agreement of the signs, symptoms, diagnosis, treatment and prognosis and additionally agrees to follow up as recommended with their PCP in 24 - 48 hours. They also agree with the care-plan and convey that all of their questions have been answered.   I have also put together some discharge instructions for them that include: 1) educational information regarding their diagnosis, 2) how to care for their diagnosis at home, as well a 3) list of reasons why they would want to return to the ED prior to their follow-up appointment, should their condition change. Discharged            DISCHARGE PLAN:  1. Current Discharge Medication List      START taking these medications    Details   oxyCODONE IR (Roxicodone) 5 mg immediate release tablet Take 1 Tablet by mouth every eight (8) hours as needed for Pain for up to 5 doses. Max Daily Amount: 15 mg.  Qty: 5 Tablet, Refills: 0    Associated Diagnoses: Dry socket         CONTINUE these medications which have NOT CHANGED    Details   acetaminophen (Tylenol Extra Strength) 500 mg tablet Take 500 mg by mouth every six (6) hours as needed for Pain. OTC      amoxicillin (AMOXIL) 500 mg capsule TAKE ONE CAPSULE BY MOUTH THREE TIMES DAILY UNTIL ALL TAKEN.      ibuprofen (MOTRIN) 800 mg tablet TAKE 1 TABLET BY MOUTH EVERY 6 HOURS AS NEEDED FOR PAIN. TAKE WITH FOOD.      hydrOXYzine HCL (ATARAX) 50 mg tablet Take 1 Tablet by mouth three (3) times daily as needed for Anxiety. Qty: 10 Tablet, Refills: 1    Associated Diagnoses: Generalized anxiety disorder      Vyvanse 70 mg cap TK 1 C PO every day  Qty: 30 Capsule, Refills: 0    Associated Diagnoses: ADHD (attention deficit hyperactivity disorder), combined type      citalopram (CELEXA) 40 mg tablet Take 1 Tab by mouth daily. Qty: 90 Tab, Refills: 3           2. Follow-up Information    None       3. Return to ED if worse   4. Discharge Medication List as of 8/15/2021  2:36 PM      START taking these medications    Details   oxyCODONE IR (Roxicodone) 5 mg immediate release tablet Take 1 Tablet by mouth every eight (8) hours as needed for Pain for up to 5 doses. Max Daily Amount: 15 mg., Normal, Disp-5 Tablet, R-0         CONTINUE these medications which have NOT CHANGED    Details   amoxicillin (AMOXIL) 500 mg capsule TAKE ONE CAPSULE BY MOUTH THREE TIMES DAILY UNTIL ALL TAKEN., Historical Med      ibuprofen (MOTRIN) 800 mg tablet TAKE 1 TABLET BY MOUTH EVERY 6 HOURS AS NEEDED FOR PAIN.  TAKE WITH FOOD., Historical Med      acetaminophen (Tylenol Extra Strength) 500 mg tablet Take 500 mg by mouth every six (6) hours as needed for Pain. OTC, Historical Med      hydrOXYzine HCL (ATARAX) 50 mg tablet Take 1 Tablet by mouth three (3) times daily as needed for Anxiety., Normal, Disp-10 Tablet, R-1      Vyvanse 70 mg cap TK 1 C PO every day, Normal, Disp-30 Capsule, R-0, SHANON      citalopram (CELEXA) 40 mg tablet Take 1 Tab by mouth daily. , Normal, Disp-90 Tab, R-3             Diagnosis     Clinical Impression:   1. Dry socket        Attestations: Rosalinda Castillo MD    Please note that this dictation was completed with [employer], the [employer] voice recognition software. Quite often unanticipated grammatical, syntax, homophones, and other interpretive errors are inadvertently transcribed by the computer software. Please disregard these errors. Please excuse any errors that have escaped final proofreading. Thank you.

## 2021-08-15 NOTE — LETTER
[facility] DE[facility]  [place] [id number]  [phone]    Work/School Note    Date: 8/15/2021    To Whom It May concern:    Allen Lemus was seen and treated today in the emergency room by the following provider(s):  Attending Provider: Renee Ramachandran MD.      Allen Lemus is excused from work/school on 08/15/21 and 08/16/21. She is medically clear to return to work/school on 8/17/2021.        Sincerely,          Dr Wright Arpit

## 2021-08-15 NOTE — DISCHARGE INSTRUCTIONS
Thank you! Thank you for allowing me to care for you in the emergency department. I sincerely hope that you are satisfied with your visit today. It is my goal to provide you with excellent care. You presented to the emergency department with dental pain. You do have dry sockets. You need to see your dentist tomorrow. We did prescribed you oxycodone to be taken until you see your dentist.  In the meanwhile, take 600 mg of ibuprofen every 8 hours and 1000 mg of Tylenol every 8 hours. You can also use Orajel cream to help with your pain. Avoid operating heavy machinery while you are using oxycodone. If you feel that you have not received excellent quality care or timely care, please ask to speak to the nurse manager. Please choose us in the future for your continued health care needs. ------------------------------------------------------------------------------------------------------------  The exam and treatment you received in the Emergency Department were for an urgent problem and are not intended as complete care. It is important that you follow-up with a doctor, nurse practitioner, or physician assistant to:  (1) confirm your diagnosis,  (2) re-evaluation of changes in your illness and treatment, and  (3) for ongoing care. If your symptoms become worse or you do not improve as expected and you are unable to reach your usual health care provider, you should return to the Emergency Department. We are available 24 hours a day. Please take your discharge instructions with you when you go to your follow-up appointment. If you have any problem arranging a follow-up appointment, contact the Emergency Department immediately. If a prescription has been provided, please have it filled as soon as possible to prevent a delay in treatment. Read the entire medication instruction sheet provided to you by the pharmacy.  If you have any questions or reservations about taking the medication due to side effects or interactions with other medications, please call your primary care physician or contact the ER to speak with the charge nurse. Make an appointment with your family doctor or the physician you were referred to for follow-up of this visit as instructed on your discharge paperwork, as this is a mandatory follow-up. Return to the ER if you are unable to be seen or if you are unable to be seen in a timely manner. If you have any problem arranging the follow-up visit, contact the Emergency Department immediately.

## 2021-08-16 DIAGNOSIS — F90.2 ADHD (ATTENTION DEFICIT HYPERACTIVITY DISORDER), COMBINED TYPE: ICD-10-CM

## 2021-08-16 RX ORDER — LISDEXAMFETAMINE DIMESYLATE 70 MG/1
CAPSULE ORAL
Qty: 30 CAPSULE | Refills: 0 | Status: SHIPPED | OUTPATIENT
Start: 2021-08-17 | End: 2021-09-13 | Stop reason: SDUPTHER

## 2021-09-13 DIAGNOSIS — F90.2 ADHD (ATTENTION DEFICIT HYPERACTIVITY DISORDER), COMBINED TYPE: ICD-10-CM

## 2021-09-13 RX ORDER — LISDEXAMFETAMINE DIMESYLATE 70 MG/1
CAPSULE ORAL
Qty: 30 CAPSULE | Refills: 0 | Status: SHIPPED | OUTPATIENT
Start: 2021-09-16 | End: 2021-10-12 | Stop reason: SDUPTHER

## 2021-10-05 ENCOUNTER — HOSPITAL ENCOUNTER (EMERGENCY)
Age: 41
Discharge: HOME | End: 2021-10-05
Payer: COMMERCIAL

## 2021-10-05 ENCOUNTER — OFFICE VISIT (OUTPATIENT)
Dept: FAMILY MEDICINE CLINIC | Age: 41
End: 2021-10-05
Payer: COMMERCIAL

## 2021-10-05 VITALS
SYSTOLIC BLOOD PRESSURE: 135 MMHG | TEMPERATURE: 98.1 F | DIASTOLIC BLOOD PRESSURE: 98 MMHG | RESPIRATION RATE: 18 BRPM | HEIGHT: 65 IN | BODY MASS INDEX: 32.49 KG/M2 | OXYGEN SATURATION: 98 % | WEIGHT: 195 LBS | HEART RATE: 87 BPM

## 2021-10-05 VITALS
RESPIRATION RATE: 16 BRPM | TEMPERATURE: 97.3 F | OXYGEN SATURATION: 100 % | WEIGHT: 203 LBS | HEART RATE: 80 BPM | DIASTOLIC BLOOD PRESSURE: 100 MMHG | SYSTOLIC BLOOD PRESSURE: 152 MMHG | BODY MASS INDEX: 33.82 KG/M2 | HEIGHT: 65 IN

## 2021-10-05 DIAGNOSIS — F90.2 ADHD (ATTENTION DEFICIT HYPERACTIVITY DISORDER), COMBINED TYPE: Primary | ICD-10-CM

## 2021-10-05 DIAGNOSIS — M43.6 TORTICOLLIS: Primary | ICD-10-CM

## 2021-10-05 DIAGNOSIS — E66.09 CLASS 1 OBESITY DUE TO EXCESS CALORIES WITHOUT SERIOUS COMORBIDITY WITH BODY MASS INDEX (BMI) OF 33.0 TO 33.9 IN ADULT: ICD-10-CM

## 2021-10-05 DIAGNOSIS — Z87.09 HISTORY OF ASTHMA: ICD-10-CM

## 2021-10-05 DIAGNOSIS — M54.2 CERVICAL PAIN (NECK): ICD-10-CM

## 2021-10-05 DIAGNOSIS — R03.0 ELEVATED BLOOD PRESSURE READING WITHOUT DIAGNOSIS OF HYPERTENSION: ICD-10-CM

## 2021-10-05 DIAGNOSIS — Z09 HOSPITAL DISCHARGE FOLLOW-UP: ICD-10-CM

## 2021-10-05 DIAGNOSIS — M43.6 TORTICOLLIS: ICD-10-CM

## 2021-10-05 PROCEDURE — 99214 OFFICE O/P EST MOD 30 MIN: CPT | Performed by: NURSE PRACTITIONER

## 2021-10-05 PROCEDURE — 74011250637 HC RX REV CODE- 250/637: Performed by: NURSE PRACTITIONER

## 2021-10-05 PROCEDURE — 99282 EMERGENCY DEPT VISIT SF MDM: CPT

## 2021-10-05 RX ORDER — IBUPROFEN 800 MG/1
TABLET ORAL
Qty: 90 TABLET | Refills: 0 | Status: SHIPPED | OUTPATIENT
Start: 2021-10-05

## 2021-10-05 RX ORDER — IBUPROFEN 800 MG/1
800 TABLET ORAL
Status: COMPLETED | OUTPATIENT
Start: 2021-10-05 | End: 2021-10-05

## 2021-10-05 RX ORDER — ALBUTEROL SULFATE 90 UG/1
2 AEROSOL, METERED RESPIRATORY (INHALATION)
Qty: 18 G | Refills: 0 | Status: SHIPPED | OUTPATIENT
Start: 2021-10-05 | End: 2021-11-29

## 2021-10-05 RX ORDER — METHOCARBAMOL 500 MG/1
500 TABLET, FILM COATED ORAL
Qty: 20 TABLET | Refills: 0 | Status: SHIPPED | OUTPATIENT
Start: 2021-10-05 | End: 2021-10-12 | Stop reason: ALTCHOICE

## 2021-10-05 RX ORDER — DIAZEPAM 5 MG/1
5 TABLET ORAL
Status: COMPLETED | OUTPATIENT
Start: 2021-10-05 | End: 2021-10-05

## 2021-10-05 RX ADMIN — IBUPROFEN 800 MG: 800 TABLET, FILM COATED ORAL at 02:34

## 2021-10-05 RX ADMIN — DIAZEPAM 5 MG: 5 TABLET ORAL at 00:54

## 2021-10-05 RX ADMIN — DIAZEPAM 5 MG: 5 TABLET ORAL at 02:34

## 2021-10-05 NOTE — ED PROVIDER NOTES
EMERGENCY DEPARTMENT HISTORY AND PHYSICAL EXAM      Date: 10/5/2021  Patient Name: Moses Coburn    History of Presenting Illness     Chief Complaint   Patient presents with    Neck Pain       History Provided By: Patient    HPI: Moses Coburn, 39 y.o. female with a past medical history significant for anxiety, ADHD, chronic pain, PTSD presents to the ED with cc of neck pain. Patient reports sudden onset of right-sided neck pain tonight around 8 PM while watching TV. She is tearful and states she is unable to straighten her head/neck up due to spasms. She denies any recent heavy lifting,  previous neck injuries or trauma. She denies any tingling/numbness or decreased sensation. She states she tried taking IBU prior to arrival with minimal improvement. There are no other complaints, changes, or physical findings at this time. PCP: Delmi Callejas NP    No current facility-administered medications on file prior to encounter. Current Outpatient Medications on File Prior to Encounter   Medication Sig Dispense Refill    Vyvanse 70 mg cap TK 1 C PO every day 30 Capsule 0    amoxicillin (AMOXIL) 500 mg capsule TAKE ONE CAPSULE BY MOUTH THREE TIMES DAILY UNTIL ALL TAKEN.  ibuprofen (MOTRIN) 800 mg tablet TAKE 1 TABLET BY MOUTH EVERY 6 HOURS AS NEEDED FOR PAIN. TAKE WITH FOOD.  acetaminophen (Tylenol Extra Strength) 500 mg tablet Take 500 mg by mouth every six (6) hours as needed for Pain. OTC      hydrOXYzine HCL (ATARAX) 50 mg tablet Take 1 Tablet by mouth three (3) times daily as needed for Anxiety. 10 Tablet 1    citalopram (CELEXA) 40 mg tablet Take 1 Tab by mouth daily.  80 Tab 3       Past History     Past Medical History:  Past Medical History:   Diagnosis Date    ADHD (attention deficit hyperactivity disorder)     Anxiety     Asthma     CHILDHOOD    Chronic back pain     Chronic pain     DISC ISSUES IN BACK    PTSD (post-traumatic stress disorder)        Past Surgical History:  Past Surgical History:   Procedure Laterality Date    HX BREAST REDUCTION  2002    HX CHOLECYSTECTOMY      HX CYST REMOVAL Right     ganglion cyst removed from right wrist    HX GYN  2012    BTL    HX GYN      D&C X 2    HX GYN  2015    Hysterectomy    HX HYSTERECTOMY      HX OTHER SURGICAL Right 2013    Ganglion cyst removal from wrist    HX OTHER SURGICAL      rectal surgery    HX TONSIL AND ADENOIDECTOMY      HX WISDOM TEETH EXTRACTION         Family History:  Family History   Problem Relation Age of Onset    Anxiety Mother     Hypertension Mother     COPD Mother     Arthritis-osteo Mother     Psychiatric Disorder Mother     Hypertension Father     Cancer Brother     Schizophrenia Maternal Aunt     Heart Disease Maternal Grandfather     Stroke Maternal Grandfather     Cancer Paternal Grandmother     Anesth Problems Neg Hx        Social History:  Social History     Tobacco Use    Smoking status: Former Smoker     Packs/day: 0.25     Years: 19.00     Pack years: 4.75     Quit date: 2019     Years since quittin.4    Smokeless tobacco: Current User    Tobacco comment: smokes vapor cig    Vaping Use    Vaping Use: Every day    Substances: Nicotine (lowest dose)    Devices: Pre-filled pod   Substance Use Topics    Alcohol use: Yes     Alcohol/week: 0.0 standard drinks     Comment: Socially.  Drug use: No       Allergies: Allergies   Allergen Reactions    Other Food Itching     Fresh fruit, cannot be specific    Codeine Hives     Allergic to tylenol with codeine combo    Tylenol-Codeine #2 Hives    Nuts [Tree Nut] Itching         Review of Systems     Review of Systems   Constitutional: Negative. Musculoskeletal: Positive for neck pain and neck stiffness. Neurological: Negative. Negative for numbness. All other systems reviewed and are negative. Physical Exam     Physical Exam  Vitals and nursing note reviewed.    Constitutional:       General: She is not in acute distress. Appearance: Normal appearance. HENT:      Head: Normocephalic and atraumatic. Eyes:      Extraocular Movements: Extraocular movements intact. Conjunctiva/sclera: Conjunctivae normal.      Pupils: Pupils are equal, round, and reactive to light. Cardiovascular:      Rate and Rhythm: Normal rate and regular rhythm. Heart sounds: Normal heart sounds. Pulmonary:      Effort: Pulmonary effort is normal.      Breath sounds: Normal breath sounds. No wheezing or rales. Musculoskeletal:      Cervical back: Spasms, torticollis and tenderness present. No signs of trauma or bony tenderness. Pain with movement and muscular tenderness present. No spinous process tenderness. Decreased range of motion. Comments: Neurovascularly intact   Skin:     General: Skin is warm and dry. Neurological:      General: No focal deficit present. Mental Status: She is alert. GCS: GCS eye subscore is 4. GCS verbal subscore is 5. GCS motor subscore is 6. Cranial Nerves: Cranial nerves are intact. Sensory: Sensation is intact. Motor: Motor function is intact. Coordination: Coordination is intact. Gait: Gait is intact. Psychiatric:         Mood and Affect: Mood is anxious. Behavior: Behavior normal. Behavior is cooperative. Lab and Diagnostic Study Results     Labs -   No results found for this or any previous visit (from the past 12 hour(s)). Radiologic Studies -   @lastxrresult@  CT Results  (Last 48 hours)    None        CXR Results  (Last 48 hours)    None            Medical Decision Making   - I am the first provider for this patient. - I reviewed the vital signs, available nursing notes, past medical history, past surgical history, family history and social history. - Initial assessment performed. The patients presenting problems have been discussed, and they are in agreement with the care plan formulated and outlined with them.   I have encouraged them to ask questions as they arise throughout their visit. Vital Signs-Reviewed the patient's vital signs. Patient Vitals for the past 12 hrs:   Temp Pulse Resp BP SpO2   10/05/21 0012 98.1 °F (36.7 °C) 87 18 (!) 135/98 98 %       Records Reviewed: Nursing Notes    The patient presents with neck pain with a differential diagnosis of torticollis, dystonic reaction, Cervical spondylosis/stenosis      ED Course:   Patient presents with nontraumatic neck pain. Exam consistent with torticollis. She was given Valium and ibuprofen with some improvement. Neurovascularly intact. Will discharge with Robaxin as needed with PCP follow-up for not improving symptoms. Discussed worrisome reasons to return to the department including worsening symptoms or decreased sensations. ED Course as of Oct 05 0514   Tue Oct 05, 2021   0151 Patient sleeping    [LP]   0225 Patient states she is feeling a little bit better but continues with right-sided spasms. Patient able to rotate and straighten her neck    [LP]      ED Course User Index  [LP] Bonny Mohan NP       Provider Notes (Medical Decision Making):     MDM  Number of Diagnoses or Management Options  Torticollis: minor  Risk of Complications, Morbidity, and/or Mortality  Presenting problems: low  Management options: low    Patient Progress  Patient progress: stable           Disposition   Disposition: Condition stable  DC-The patient was given verbal follow-up instructions  DC- Pain Control DC Home plan: Nonsteroidals, Tylenol, Muscle relaxants and Referral Family Medicine/PCP    Discharged    DISCHARGE PLAN:  1.    Current Discharge Medication List      CONTINUE these medications which have NOT CHANGED    Details   Vyvanse 70 mg cap TK 1 C PO every day  Qty: 30 Capsule, Refills: 0    Associated Diagnoses: ADHD (attention deficit hyperactivity disorder), combined type      amoxicillin (AMOXIL) 500 mg capsule TAKE ONE CAPSULE BY MOUTH THREE TIMES DAILY UNTIL ALL TAKEN.      ibuprofen (MOTRIN) 800 mg tablet TAKE 1 TABLET BY MOUTH EVERY 6 HOURS AS NEEDED FOR PAIN. TAKE WITH FOOD.      acetaminophen (Tylenol Extra Strength) 500 mg tablet Take 500 mg by mouth every six (6) hours as needed for Pain. OTC      hydrOXYzine HCL (ATARAX) 50 mg tablet Take 1 Tablet by mouth three (3) times daily as needed for Anxiety. Qty: 10 Tablet, Refills: 1    Associated Diagnoses: Generalized anxiety disorder      citalopram (CELEXA) 40 mg tablet Take 1 Tab by mouth daily. Qty: 90 Tab, Refills: 3           2. Follow-up Information     Follow up With Specialties Details Why Contact Info    Nitish Pope NP Nurse Practitioner Schedule an appointment as soon as possible for a visit in 2 days for ER follow up, If symptoms worsen [place] [address] [address] [facility] [address]  [phone]          3. Return to ED if worse   4. Discharge Medication List as of 10/5/2021  2:32 AM      START taking these medications    Details   methocarbamoL (Robaxin) 500 mg tablet Take 1 Tablet by mouth four (4) times daily as needed for Muscle Spasm(s). , Normal, Disp-20 Tablet, R-0         CONTINUE these medications which have NOT CHANGED    Details   Vyvanse 70 mg cap TK 1 C PO every day, Normal, Disp-30 Capsule, R-0, SHANON      amoxicillin (AMOXIL) 500 mg capsule TAKE ONE CAPSULE BY MOUTH THREE TIMES DAILY UNTIL ALL TAKEN., Historical Med      ibuprofen (MOTRIN) 800 mg tablet TAKE 1 TABLET BY MOUTH EVERY 6 HOURS AS NEEDED FOR PAIN. TAKE WITH FOOD., Historical Med      acetaminophen (Tylenol Extra Strength) 500 mg tablet Take 500 mg by mouth every six (6) hours as needed for Pain. OTC, Historical Med      hydrOXYzine HCL (ATARAX) 50 mg tablet Take 1 Tablet by mouth three (3) times daily as needed for Anxiety., Normal, Disp-10 Tablet, R-1      citalopram (CELEXA) 40 mg tablet Take 1 Tab by mouth daily. , Normal, Disp-90 Tab, R-3               Diagnosis     Clinical Impression:   1.  Torticollis Attestations:    Debra Moreno NP    Please note that this dictation was completed with [employer], the computer voice recognition software. Quite often unanticipated grammatical, syntax, homophones, and other interpretive errors are inadvertently transcribed by the computer software. Please disregard these errors. Please excuse any errors that have escaped final proofreading. Thank you.

## 2021-10-05 NOTE — PATIENT INSTRUCTIONS
Neck: Exercises  Introduction  Here are some examples of exercises for you to try. The exercises may be suggested for a condition or for rehabilitation. Start each exercise slowly. Ease off the exercises if you start to have pain. You will be told when to start these exercises and which ones will work best for you. How to do the exercises  Neck stretch    1. This stretch works best if you keep your shoulder down as you lean away from it. To help you remember to do this, start by relaxing your shoulders and lightly holding on to your thighs or your chair. 2. Tilt your head toward your shoulder and hold for 15 to 30 seconds. Let the weight of your head stretch your muscles. 3. If you would like a little added stretch, use your hand to gently and steadily pull your head toward your shoulder. For example, keeping your right shoulder down, lean your head to the left. 4. Repeat 2 to 4 times toward each shoulder. Diagonal neck stretch    1. Turn your head slightly toward the direction you will be stretching, and tilt your head diagonally toward your chest and hold for 15 to 30 seconds. 2. If you would like a little added stretch, use your hand to gently and steadily pull your head forward on the diagonal.  3. Repeat 2 to 4 times toward each side. Dorsal glide stretch    The dorsal glide stretches the back of the neck. If you feel pain, do not glide so far back. Some people find this exercise easier to do while lying on their backs with an ice pack on the neck. 1. Sit or stand tall and look straight ahead. 2. Slowly tuck your chin as you glide your head backward over your body  3. Hold for a count of 6, and then relax for up to 10 seconds. 4. Repeat 8 to 12 times. Chest and shoulder stretch    1. Sit or stand tall and glide your head backward as in the dorsal glide stretch. 2. Raise both arms so that your hands are next to your ears.   3. Take a deep breath, and as you breathe out, lower your elbows down and behind your back. You will feel your shoulder blades slide down and together, and at the same time you will feel a stretch across your chest and the front of your shoulders. 4. Hold for about 6 seconds, and then relax for up to 10 seconds. 5. Repeat 8 to 12 times. Strengthening: Hands on head    1. Move your head backward, forward, and side to side against gentle pressure from your hands, holding each position for about 6 seconds. 2. Repeat 8 to 12 times. Follow-up care is a key part of your treatment and safety. Be sure to make and go to all appointments, and call your doctor if you are having problems. It's also a good idea to know your test results and keep a list of the medicines you take. Where can you learn more? Go to http://www.bark[employer].com/  Enter P975 in the search box to learn more about \"Neck: Exercises. \"  Current as of: July 1, 2021               Content Version: 13.0  © 2006-2021 [employer]. Care instructions adapted under license by [employer] (which disclaims liability or warranty for this information). If you have questions about a medical condition or this instruction, always ask your healthcare professional. [address] any warranty or liability for your use of this information.

## 2021-10-05 NOTE — PROGRESS NOTES
Chief Complaint   Patient presents with    Follow-up     ADHD, breathing issues   1. Have you been to the ER, urgent care clinic since your last visit? Hospitalized since your last visit? Yes Reason for visit: [facility] 10/04/2021, neck pain    2. Have you seen or consulted any other health care providers outside of the [address] [place] [address] since your last visit? Include any pap smears or colon screening.  No

## 2021-10-12 ENCOUNTER — OFFICE VISIT (OUTPATIENT)
Dept: FAMILY MEDICINE CLINIC | Age: 41
End: 2021-10-12
Payer: COMMERCIAL

## 2021-10-12 VITALS
HEART RATE: 93 BPM | DIASTOLIC BLOOD PRESSURE: 88 MMHG | HEIGHT: 65 IN | WEIGHT: 202 LBS | OXYGEN SATURATION: 99 % | TEMPERATURE: 97.6 F | RESPIRATION RATE: 16 BRPM | SYSTOLIC BLOOD PRESSURE: 148 MMHG | BODY MASS INDEX: 33.66 KG/M2

## 2021-10-12 DIAGNOSIS — R06.02 SHORTNESS OF BREATH: ICD-10-CM

## 2021-10-12 DIAGNOSIS — J30.2 SEASONAL ALLERGIC RHINITIS, UNSPECIFIED TRIGGER: Primary | ICD-10-CM

## 2021-10-12 DIAGNOSIS — E66.09 CLASS 1 OBESITY DUE TO EXCESS CALORIES WITHOUT SERIOUS COMORBIDITY WITH BODY MASS INDEX (BMI) OF 33.0 TO 33.9 IN ADULT: ICD-10-CM

## 2021-10-12 DIAGNOSIS — F41.1 GENERALIZED ANXIETY DISORDER: ICD-10-CM

## 2021-10-12 DIAGNOSIS — F90.2 ADHD (ATTENTION DEFICIT HYPERACTIVITY DISORDER), COMBINED TYPE: ICD-10-CM

## 2021-10-12 DIAGNOSIS — R03.0 ELEVATED BLOOD PRESSURE READING WITHOUT DIAGNOSIS OF HYPERTENSION: ICD-10-CM

## 2021-10-12 LAB
FEV 2 POC: 390
FEV 3 POC: 360
FEV BEST POC: 410
FEV1: 410 L

## 2021-10-12 PROCEDURE — 99214 OFFICE O/P EST MOD 30 MIN: CPT | Performed by: NURSE PRACTITIONER

## 2021-10-12 PROCEDURE — S8110 PEAK EXPIRATORY FLOW RATE (P: HCPCS | Performed by: NURSE PRACTITIONER

## 2021-10-12 RX ORDER — LORATADINE 10 MG/1
10 TABLET ORAL DAILY
Qty: 90 TABLET | Refills: 3 | Status: SHIPPED | OUTPATIENT
Start: 2021-10-12 | End: 2022-03-03 | Stop reason: SDUPTHER

## 2021-10-12 RX ORDER — FLUTICASONE PROPIONATE 50 MCG
2 SPRAY, SUSPENSION (ML) NASAL DAILY
Qty: 3 EACH | Refills: 3 | Status: SHIPPED | OUTPATIENT
Start: 2021-10-12 | End: 2022-03-31

## 2021-10-12 ASSESSMENT — PULMONARY FUNCTION TESTS: FEV1: 410

## 2021-10-12 NOTE — PROGRESS NOTES
Chief Complaint   Patient presents with    Follow-up     discuss asthma   1. Have you been to the ER, urgent care clinic since your last visit? Hospitalized since your last visit? No    2. Have you seen or consulted any other health care providers outside of the [address] [place] [address] since your last visit? Include any pap smears or colon screening.  No   Visit Vitals  BP (!) 148/88 (BP 1 Location: Left upper arm, BP Patient Position: Sitting)   Pulse 93   Temp 97.6 °F (36.4 °C) (Temporal)   Resp 16   Ht 5' 5\" (1.651 m)   Wt 202 lb (91.6 kg)   LMP 05/19/2015 (Approximate)   SpO2 99%   BMI 33.61 kg/m²

## 2021-10-12 NOTE — PROGRESS NOTES
Subjective  Chief Complaint   Patient presents with    Follow-up     discuss asthma     HPI:  Jitendra Lion is a 39 y.o. female. Patient presents with c/o coughing, SOB and wheezing daily for the past \"couple\" of months. Has been using Albuterol 1-2 times per day which resolves symptoms. Reports h/o asthma as a child which required a daily and rescue inhaler. Outgrew asthma during high school. Reports h/o seasonal allergies, not currently taking meds. Previously allergic to cats which resolved but has returned as well. Quit smoking 5/2019. Has not been checking home BP readings since her last visit. Presents with dietary log to discuss. Currently biking 3-4 times per week for 20-30 minutes at a brisk pace without symptoms. Log reviewed, typically skips breakfast, eats processed, high calorie and fat foods. Currently intermittent dieting, eating 8-10 hours per day and fasting for 12-14 hours.      Past Medical History:   Diagnosis Date    ADHD (attention deficit hyperactivity disorder)     Anxiety     Asthma     CHILDHOOD    Chronic back pain     Chronic pain     DISC ISSUES IN BACK    PTSD (post-traumatic stress disorder)      Family History   Problem Relation Age of Onset    Anxiety Mother     Hypertension Mother     COPD Mother     Arthritis-osteo Mother     Psychiatric Disorder Mother     Hypertension Father     Cancer Brother     Schizophrenia Maternal Aunt     Heart Disease Maternal Grandfather     Stroke Maternal Grandfather     Cancer Paternal Grandmother     Anesth Problems Neg Hx      Social History     Socioeconomic History    Marital status: LEGALLY      Spouse name: Not on file    Number of children: Not on file    Years of education: Not on file    Highest education level: Not on file   Occupational History    Not on file   Tobacco Use    Smoking status: Former Smoker     Packs/day: 0.25     Years: 19.00     Pack years: 4.75     Quit date: 05/2019     Years since quittin.4    Smokeless tobacco: Current User    Tobacco comment: smokes vapor cig    Vaping Use    Vaping Use: Every day    Substances: Nicotine (lowest dose)    Devices: Pre-filled pod   Substance and Sexual Activity    Alcohol use: Yes     Alcohol/week: 0.0 standard drinks     Comment: Socially.  Drug use: No    Sexual activity: Yes     Partners: Male     Birth control/protection: Surgical   Other Topics Concern    Not on file   Social History Narrative    Not on file     Social Determinants of Health     Financial Resource Strain:     Difficulty of Paying Living Expenses:    Food Insecurity:     Worried About Running Out of Food in the Last Year:     [address] in the Last Year:    Transportation Needs:     Lack of Transportation (Medical):  Lack of Transportation (Non-Medical):    Physical Activity:     Days of Exercise per Week:     Minutes of Exercise per Session:    Stress:     Feeling of Stress :    Social Connections:     Frequency of Communication with Friends and Family:     Frequency of Social Gatherings with Friends and Family:     Attends [employer] Services:     Active Member of Clubs or Organizations:     Attends Club or Organization Meetings:     Marital Status:    Intimate Partner Violence:     Fear of Current or Ex-Partner:     Emotionally Abused:     Physically Abused:     Sexually Abused:      Current Outpatient Medications on File Prior to Visit   Medication Sig Dispense Refill    albuterol (PROVENTIL HFA, VENTOLIN HFA, PROAIR HFA) 90 mcg/actuation inhaler Take 2 Puffs by inhalation every four (4) hours as needed for Wheezing. 18 g 0    ibuprofen (MOTRIN) 800 mg tablet TAKE 1 TABLET BY MOUTH EVERY 6 HOURS AS NEEDED FOR PAIN. TAKE WITH FOOD. 90 Tablet 0    Vyvanse 70 mg cap TK 1 C PO every day 30 Capsule 0    acetaminophen (Tylenol Extra Strength) 500 mg tablet Take 500 mg by mouth every six (6) hours as needed for Pain.  OTC      hydrOXYzine HCL (ATARAX) 50 mg tablet Take 1 Tablet by mouth three (3) times daily as needed for Anxiety. 10 Tablet 1    citalopram (CELEXA) 40 mg tablet Take 1 Tab by mouth daily. 90 Tab 3    [DISCONTINUED] methocarbamoL (Robaxin) 500 mg tablet Take 1 Tablet by mouth four (4) times daily as needed for Muscle Spasm(s). (Patient not taking: Reported on 10/12/2021) 20 Tablet 0     No current facility-administered medications on file prior to visit. Allergies   Allergen Reactions    Other Food Itching     Fresh fruit, cannot be specific    Codeine Hives     Allergic to tylenol with codeine combo    Tylenol-Codeine #2 Hives    Nuts [Tree Nut] Itching     ROS  See HPI for pertinent ROS. Objective  Visit Vitals  BP (!) 148/88 (BP 1 Location: Left upper arm, BP Patient Position: Sitting)   Pulse 93   Temp 97.6 °F (36.4 °C) (Temporal)   Resp 16   Ht 5' 5\" (1.651 m)   Wt 202 lb (91.6 kg)   SpO2 99%   BMI 33.61 kg/m²       Physical Exam  Vitals and nursing note reviewed. Constitutional:       General: She is not in acute distress. Appearance: Normal appearance. She is obese. HENT:      Head: Normocephalic. Eyes:      Extraocular Movements: Extraocular movements intact. Cardiovascular:      Rate and Rhythm: Normal rate and regular rhythm. Heart sounds: Normal heart sounds. Pulmonary:      Effort: Pulmonary effort is normal.      Breath sounds: Normal breath sounds. Musculoskeletal:         General: Normal range of motion. Right lower leg: No edema. Left lower leg: No edema. Skin:     General: Skin is warm and dry. Neurological:      Mental Status: She is alert and oriented to person, place, and time.    Psychiatric:         Mood and Affect: Mood normal.         Behavior: Behavior normal.          Assessment & Plan      ICD-10-CM ICD-9-CM    1. Seasonal allergic rhinitis, unspecified trigger  J30.2 477.9 fluticasone propionate (FLONASE) 50 mcg/actuation nasal spray      loratadine (Claritin) 10 mg tablet   2. Shortness of breath  R06.02 786.05 AMB POC PEAK EXPIRATORY FLOW RATE      AMB POC SPIROMETRY   3. Elevated blood pressure reading without diagnosis of hypertension  R03.0 796.2    4. Class 1 obesity due to excess calories without serious comorbidity with body mass index (BMI) of 33.0 to 33.9 in adult  E66.09 278.00     Z68.33 V85.33      Diagnoses and all orders for this visit:    1. Seasonal allergic rhinitis, unspecified trigger  Peak flows and spirometry unremarkable. Able to exercise without symptoms. Symptoms are consistent with allergies as the likely cause of her symptoms. Start medications as ordered. Recommend using both until seasonal fall change is complete and use one as a daily maintenance medication year round. Use of Albuterol should decrease as symptoms improve. -     fluticasone propionate (FLONASE) 50 mcg/actuation nasal spray; 2 Sprays by Both Nostrils route daily. -     loratadine (Claritin) 10 mg tablet; Take 1 Tablet by mouth daily. 2. Shortness of breath  Peak flow above goal in the office today and spirometry essentially normal.  -     AMB POC PEAK EXPIRATORY FLOW RATE  -     AMB POC SPIROMETRY    3. Elevated blood pressure reading without diagnosis of hypertension  BP remains above goal in the office today. She verbalized understanding to check BP readings several times over the next few weeks and to bring BP log to her follow-up appointment in 1 month. 4. Class 1 obesity due to excess calories without serious comorbidity with body mass index (BMI) of 33.0 to 33.9 in adult  Praised for beginning regular exercise. Increase with goal of minimum 5 days/week for 30 minutes. Dietary goals for the next month include eliminating [employer] coffee daily with for breakfast, drink mostly water, and start eating breakfast daily. Several handouts were provided for home review in reference to healthy eating and exercise.     Follow-up and Dispositions    · Return in about 1 month (around 11/12/2021) for follow up allergies, BP, weight .            Jose Platt, NP

## 2021-10-13 RX ORDER — LISDEXAMFETAMINE DIMESYLATE 70 MG/1
CAPSULE ORAL
Qty: 30 CAPSULE | Refills: 0 | Status: SHIPPED | OUTPATIENT
Start: 2021-10-14 | End: 2021-11-10 | Stop reason: SDUPTHER

## 2021-10-13 RX ORDER — HYDROXYZINE 50 MG/1
50 TABLET, FILM COATED ORAL
Qty: 10 TABLET | Refills: 1 | Status: SHIPPED | OUTPATIENT
Start: 2021-10-14 | End: 2021-11-09

## 2021-11-10 DIAGNOSIS — F90.2 ADHD (ATTENTION DEFICIT HYPERACTIVITY DISORDER), COMBINED TYPE: ICD-10-CM

## 2021-11-11 RX ORDER — LISDEXAMFETAMINE DIMESYLATE 70 MG/1
CAPSULE ORAL
Qty: 30 CAPSULE | Refills: 0 | Status: SHIPPED | OUTPATIENT
Start: 2021-11-11 | End: 2021-12-06 | Stop reason: SDUPTHER

## 2021-11-26 DIAGNOSIS — Z87.09 HISTORY OF ASTHMA: ICD-10-CM

## 2021-11-29 ENCOUNTER — HOSPITAL ENCOUNTER (EMERGENCY)
Age: 41
Discharge: HOME | End: 2021-11-29
Payer: COMMERCIAL

## 2021-11-29 PROCEDURE — 75810000275 HC EMERGENCY DEPT VISIT NO LEVEL OF CARE

## 2021-11-29 RX ORDER — ALBUTEROL SULFATE 90 UG/1
AEROSOL, METERED RESPIRATORY (INHALATION)
Qty: 18 G | Refills: 0 | Status: SHIPPED | OUTPATIENT
Start: 2021-11-29 | End: 2022-01-29 | Stop reason: SDUPTHER

## 2021-11-30 ENCOUNTER — OFFICE VISIT (OUTPATIENT)
Dept: FAMILY MEDICINE CLINIC | Age: 41
End: 2021-11-30
Payer: COMMERCIAL

## 2021-11-30 VITALS
DIASTOLIC BLOOD PRESSURE: 90 MMHG | OXYGEN SATURATION: 97 % | HEART RATE: 90 BPM | TEMPERATURE: 97.5 F | RESPIRATION RATE: 16 BRPM | SYSTOLIC BLOOD PRESSURE: 148 MMHG

## 2021-11-30 DIAGNOSIS — K64.9 HEMORRHOIDS, UNSPECIFIED HEMORRHOID TYPE: Primary | ICD-10-CM

## 2021-11-30 DIAGNOSIS — R03.0 ELEVATED BLOOD PRESSURE READING WITHOUT DIAGNOSIS OF HYPERTENSION: ICD-10-CM

## 2021-11-30 DIAGNOSIS — K62.5 RECTAL BLEEDING: ICD-10-CM

## 2021-11-30 PROCEDURE — 99213 OFFICE O/P EST LOW 20 MIN: CPT | Performed by: NURSE PRACTITIONER

## 2021-11-30 NOTE — PATIENT INSTRUCTIONS
Hemorrhoids: Care Instructions  Overview     Hemorrhoids are swollen veins that develop in the anal canal. Bleeding during bowel movements, itching, and rectal pain are the most common symptoms. Hemorrhoids can be uncomfortable at times, but rarely are they a serious problem. Most of the time, you can treat them with simple changes to your diet and bowel habits. These changes include eating more fiber and not straining to pass stools. Most hemorrhoids don't need surgery or other treatment unless they are very large and painful or bleed a lot. Follow-up care is a key part of your treatment and safety. Be sure to make and go to all appointments, and call your doctor if you are having problems. It's also a good idea to know your test results and keep a list of the medicines you take. How can you care for yourself at home? · Sit in a few inches of warm water (sitz bath) 3 times a day and after bowel movements. The warm water helps with pain and itching. · Put ice on your anal area several times a day for 10 minutes at a time. Put a thin cloth between the ice and your skin. Follow this by placing a warm, wet towel on the area for another 10 to 20 minutes. · Take pain medicines exactly as directed. ? If the doctor gave you a prescription medicine for pain, take it as prescribed. ? If you are not taking a prescription pain medicine, ask your doctor if you can take an over-the-counter medicine. · Keep the anal area clean, but be gentle. Use water and a fragrance-free soap, or use baby wipes or medicated pads such as Tucks. · Wear cotton underwear and loose clothing to decrease moisture in the anal area. · Eat more fiber. Include foods such as whole-grain breads and cereals, raw vegetables, raw and dried fruits, and beans. · Drink plenty of fluids. If you have kidney, heart, or liver disease and have to limit fluids, talk with your doctor before you increase the amount of fluids you drink.   · Use a stool softener that contains bran or psyllium. You can save money by buying bran or psyllium (available in bulk at most health food stores) and sprinkling it on foods or stirring it into fruit juice. Or you can use a product such as Metamucil or Hydrocil. · Practice healthy bowel habits. ? Go to the bathroom as soon as you have the urge. ? Avoid straining to pass stools. Relax and give yourself time to let things happen naturally. ? Do not hold your breath while passing stools. ? Do not read while sitting on the toilet. Get off the toilet as soon as you have finished. · Take your medicines exactly as prescribed. Call your doctor if you think you are having a problem with your medicine. When should you call for help? Call 911 anytime you think you may need emergency care. For example, call if:    · You pass maroon or very bloody stools. Call your doctor now or seek immediate medical care if:    · You have increased pain.     · You have increased bleeding. Watch closely for changes in your health, and be sure to contact your doctor if:    · Your symptoms have not improved after 3 or 4 days. Where can you learn more? Go to http://www.[employer].com/  Enter F228 in the search box to learn more about \"Hemorrhoids: Care Instructions. \"  Current as of: February 10, 2021               Content Version: 13.0  © 2377-7468 [employer]. Care instructions adapted under license by [employer] (which disclaims liability or warranty for this information). If you have questions about a medical condition or this instruction, always ask your healthcare professional. [place] [address] any warranty or liability for your use of this information.

## 2021-11-30 NOTE — PROGRESS NOTES
Chief Complaint   Patient presents with    Other     hemmoroids, referral? went to ER last night and did not wait   1. Have you been to the ER, urgent care clinic since your last visit? Hospitalized since your last visit? No    2. Have you seen or consulted any other health care providers outside of the [address] [place] [address] since your last visit? Include any pap smears or colon screening.  No   Visit Vitals  BP (!) 148/90 (BP 1 Location: Left upper arm, BP Patient Position: Sitting)   Pulse 90   Temp 97.5 °F (36.4 °C)   Resp 16   LMP 05/19/2015 (Approximate)   SpO2 97%

## 2021-11-30 NOTE — PROGRESS NOTES
Subjective  Chief Complaint   Patient presents with    Other     hemmoroids, referral? went to ER last night and did not wait     HPI:  Steve Brooks is a 39 y.o. female. Patient presents with c/o rectal pain and possible hemorrhoid for the past week. Diagnosed with hemorrhoids one year ago that resolved when hemorrhoid ruptured. Went to ED last night with severe pain, 10+/10. Left when hemorrhoid ruptured and pain improved. Today pain is 4-5/10, reports difficulty sitting, and rectal bleeding. Past Medical History:   Diagnosis Date    ADHD (attention deficit hyperactivity disorder)     Anxiety     Asthma     CHILDHOOD    Chronic back pain     Chronic pain     DISC ISSUES IN BACK    PTSD (post-traumatic stress disorder)      Family History   Problem Relation Age of Onset    Anxiety Mother     Hypertension Mother     COPD Mother     Arthritis-osteo Mother     Psychiatric Disorder Mother     Hypertension Father     Cancer Brother     Schizophrenia Maternal Aunt     Heart Disease Maternal Grandfather     Stroke Maternal Grandfather     Cancer Paternal Grandmother     Anesth Problems Neg Hx      Social History     Socioeconomic History    Marital status:      Spouse name: Not on file    Number of children: Not on file    Years of education: Not on file    Highest education level: Not on file   Occupational History    Not on file   Tobacco Use    Smoking status: Former Smoker     Packs/day: 0.25     Years: 19.00     Pack years: 4.75     Quit date: 2019     Years since quittin.5    Smokeless tobacco: Current User    Tobacco comment: smokes vapor cig    Vaping Use    Vaping Use: Every day    Substances: Nicotine (lowest dose)    Devices: Pre-filled pod   Substance and Sexual Activity    Alcohol use: Yes     Alcohol/week: 0.0 standard drinks     Comment: Socially.      Drug use: No    Sexual activity: Yes     Partners: Male     Birth control/protection: Surgical   Other Topics Concern    Not on file   Social History Narrative    Not on file     Social Determinants of Health     Financial Resource Strain:     Difficulty of Paying Living Expenses: Not on file   Food Insecurity:     Worried About Running Out of Food in the Last Year: Not on file    [place] of Food in the Last Year: Not on file   Transportation Needs:     Lack of Transportation (Medical): Not on file    Lack of Transportation (Non-Medical): Not on file   Physical Activity:     Days of Exercise per Week: Not on file    Minutes of Exercise per Session: Not on file   Stress:     Feeling of Stress : Not on file   Social Connections:     Frequency of Communication with Friends and Family: Not on file    Frequency of Social Gatherings with Friends and Family: Not on file    Attends Ep[employer] Services: Not on file    Active Member of Clubs or Organizations: Not on file    Attends Club or Organization Meetings: Not on file    Marital Status: Not on file   Intimate Partner Violence:     Fear of Current or Ex-Partner: Not on file    Emotionally Abused: Not on file    Physically Abused: Not on file    Sexually Abused: Not on file   Housing Stability:     Unable to Pay for Housing in the Last Year: Not on file    Number of Places Lived in the Last Year: Not on file    Unstable Housing in the Last Year: Not on file     Current Outpatient Medications on File Prior to Visit   Medication Sig Dispense Refill    albuterol (PROVENTIL HFA, VENTOLIN HFA, PROAIR HFA) 90 mcg/actuation inhaler INHALE 2 PUFFS BY MOUTH EVERY 4 HOURS AS NEEDED FOR WHEEZING 18 g 0    Vyvanse 70 mg cap TK 1 C PO every day 30 Capsule 0    hydrOXYzine HCL (ATARAX) 50 mg tablet TAKE 1 TABLET BY MOUTH THREE TIMES DAILY AS NEEDED FOR ANXIETY 10 Tablet 5    fluticasone propionate (FLONASE) 50 mcg/actuation nasal spray 2 Sprays by Both Nostrils route daily. 3 Each 3    loratadine (Claritin) 10 mg tablet Take 1 Tablet by mouth daily.  90 Tablet 3    ibuprofen (MOTRIN) 800 mg tablet TAKE 1 TABLET BY MOUTH EVERY 6 HOURS AS NEEDED FOR PAIN. TAKE WITH FOOD. 90 Tablet 0    citalopram (CELEXA) 40 mg tablet Take 1 Tab by mouth daily. 90 Tab 3    acetaminophen (Tylenol Extra Strength) 500 mg tablet Take 500 mg by mouth every six (6) hours as needed for Pain. OTC       No current facility-administered medications on file prior to visit. Allergies   Allergen Reactions    Other Food Itching     Fresh fruit, cannot be specific    Codeine Hives     Allergic to tylenol with codeine combo    Tylenol-Codeine #2 Hives    Nuts [Tree Nut] Itching     ROS  See HPI for pertinent ROS. Objective  Visit Vitals  BP (!) 148/90 (BP 1 Location: Left upper arm, BP Patient Position: Sitting)   Pulse 90   Temp 97.5 °F (36.4 °C)   Resp 16   SpO2 97%       Physical Exam  Vitals and nursing note reviewed. Constitutional:       General: She is not in acute distress. Appearance: Normal appearance. She is obese. HENT:      Head: Normocephalic. Eyes:      Extraocular Movements: Extraocular movements intact. Pulmonary:      Effort: Pulmonary effort is normal.   Genitourinary:     Rectum: External hemorrhoid present. Comments: One small pink, fleshy external hemorrhoid with no discharge  Musculoskeletal:         General: Normal range of motion. Right lower leg: No edema. Left lower leg: No edema. Skin:     General: Skin is warm and dry. Neurological:      Mental Status: She is alert and oriented to person, place, and time. Psychiatric:         Mood and Affect: Mood normal.         Behavior: Behavior normal.          Assessment & Plan      ICD-10-CM ICD-9-CM    1. Hemorrhoids, unspecified hemorrhoid type  K64.9 455.6 REFERRAL TO COLON AND RECTAL SURGERY   2. Rectal bleeding  K62.5 569.3    3. Elevated blood pressure reading without diagnosis of hypertension  R03.0 796.2      Diagnoses and all orders for this visit:    1.  Hemorrhoids, unspecified hemorrhoid type  Internal exam deferred due to pain. Referring to colon and rectal specialist for further evaluation. In the meantime, recommend witch hazel pads, over-the-counter hemorrhoid medication, sitz bath/warm or cool compresses all as needed. -     REFERRAL TO COLON AND RECTAL SURGERY    2. Rectal bleeding  Likely due to hemorrhoids. 3. Elevated blood pressure reading without diagnosis of hypertension  BP is above goal in the office today. she is going to check readings outside the office after sitting quietly for 5 minutes with both feet flat on the floor and arm at heart level. Instructed to call if staying consistently greater than 140/90 on either number. In the meantime, stay hydrated, lower salt in diet and increase exercise. Follow-up and Dispositions    · Return in about 11 weeks (around 2/15/2022) for wellness, fasting labs, follow up, adhd.            Puja Ferraro, NP

## 2021-12-06 DIAGNOSIS — F90.2 ADHD (ATTENTION DEFICIT HYPERACTIVITY DISORDER), COMBINED TYPE: ICD-10-CM

## 2021-12-07 RX ORDER — LISDEXAMFETAMINE DIMESYLATE 70 MG/1
CAPSULE ORAL
Qty: 30 CAPSULE | Refills: 0 | Status: SHIPPED | OUTPATIENT
Start: 2021-12-10 | End: 2022-01-05 | Stop reason: SDUPTHER

## 2021-12-15 ENCOUNTER — OFFICE VISIT (OUTPATIENT)
Dept: SURGERY | Age: 41
End: 2021-12-15

## 2021-12-15 VITALS
OXYGEN SATURATION: 98 % | HEART RATE: 106 BPM | SYSTOLIC BLOOD PRESSURE: 140 MMHG | TEMPERATURE: 98.2 F | WEIGHT: 202.8 LBS | DIASTOLIC BLOOD PRESSURE: 96 MMHG | HEIGHT: 65 IN | BODY MASS INDEX: 33.79 KG/M2 | RESPIRATION RATE: 16 BRPM

## 2021-12-15 DIAGNOSIS — K64.5 THROMBOSED EXTERNAL HEMORRHOID: ICD-10-CM

## 2021-12-15 PROCEDURE — 99203 OFFICE O/P NEW LOW 30 MIN: CPT | Performed by: COLON & RECTAL SURGERY

## 2021-12-15 RX ORDER — CALC/MAG/B COMPLEX/D3/HERB 61
15 TABLET ORAL
COMMUNITY

## 2021-12-15 RX ORDER — HYDROCORTISONE 25 MG/G
CREAM TOPICAL 2 TIMES DAILY
Qty: 30 G | Refills: 0 | Status: SHIPPED | OUTPATIENT
Start: 2021-12-15 | End: 2022-03-31

## 2022-01-05 ENCOUNTER — TELEPHONE (OUTPATIENT)
Dept: FAMILY MEDICINE CLINIC | Age: 42
End: 2022-01-05

## 2022-01-05 DIAGNOSIS — F90.2 ADHD (ATTENTION DEFICIT HYPERACTIVITY DISORDER), COMBINED TYPE: ICD-10-CM

## 2022-01-05 RX ORDER — LISDEXAMFETAMINE DIMESYLATE 70 MG/1
CAPSULE ORAL
Qty: 30 CAPSULE | Refills: 0 | Status: SHIPPED | OUTPATIENT
Start: 2022-01-07 | End: 2022-01-06 | Stop reason: SDUPTHER

## 2022-01-06 ENCOUNTER — TELEPHONE (OUTPATIENT)
Dept: FAMILY MEDICINE CLINIC | Age: 42
End: 2022-01-06

## 2022-01-06 RX ORDER — LISDEXAMFETAMINE DIMESYLATE 70 MG/1
CAPSULE ORAL
Qty: 30 CAPSULE | Refills: 0 | Status: SHIPPED | OUTPATIENT
Start: 2022-01-07 | End: 2022-02-03 | Stop reason: SDUPTHER

## 2022-01-06 NOTE — TELEPHONE ENCOUNTER
Pt phoned stating that the Rx of Vyvanse that was called in to [facility] needs to be called in to [facility] on [address]

## 2022-01-10 RX ORDER — CITALOPRAM 40 MG/1
40 TABLET, FILM COATED ORAL DAILY
Qty: 90 TABLET | Refills: 3 | Status: SHIPPED | OUTPATIENT
Start: 2022-01-10 | End: 2022-08-02 | Stop reason: SDUPTHER

## 2022-01-26 DIAGNOSIS — F41.1 GENERALIZED ANXIETY DISORDER: ICD-10-CM

## 2022-01-27 RX ORDER — HYDROXYZINE 50 MG/1
50 TABLET, FILM COATED ORAL
Qty: 10 TABLET | Refills: 5 | Status: SHIPPED | OUTPATIENT
Start: 2022-01-27 | End: 2022-05-19 | Stop reason: SDUPTHER

## 2022-01-29 DIAGNOSIS — Z87.09 HISTORY OF ASTHMA: ICD-10-CM

## 2022-01-31 RX ORDER — ALBUTEROL SULFATE 90 UG/1
2 AEROSOL, METERED RESPIRATORY (INHALATION)
Qty: 18 G | Refills: 0 | Status: SHIPPED | OUTPATIENT
Start: 2022-01-31 | End: 2022-03-30

## 2022-02-03 DIAGNOSIS — F90.2 ADHD (ATTENTION DEFICIT HYPERACTIVITY DISORDER), COMBINED TYPE: ICD-10-CM

## 2022-02-04 RX ORDER — LISDEXAMFETAMINE DIMESYLATE 70 MG/1
CAPSULE ORAL
Qty: 30 CAPSULE | Refills: 0 | Status: SHIPPED | OUTPATIENT
Start: 2022-02-04 | End: 2022-03-03 | Stop reason: SDUPTHER

## 2022-03-30 DIAGNOSIS — Z87.09 HISTORY OF ASTHMA: ICD-10-CM

## 2022-03-30 RX ORDER — ALBUTEROL SULFATE 90 UG/1
AEROSOL, METERED RESPIRATORY (INHALATION)
Qty: 18 EACH | Refills: 0 | Status: SHIPPED | OUTPATIENT
Start: 2022-03-30 | End: 2022-05-05

## 2022-03-31 ENCOUNTER — OFFICE VISIT (OUTPATIENT)
Dept: FAMILY MEDICINE CLINIC | Age: 42
End: 2022-03-31
Payer: COMMERCIAL

## 2022-03-31 VITALS
HEART RATE: 89 BPM | RESPIRATION RATE: 16 BRPM | DIASTOLIC BLOOD PRESSURE: 84 MMHG | OXYGEN SATURATION: 98 % | SYSTOLIC BLOOD PRESSURE: 138 MMHG | TEMPERATURE: 97.3 F

## 2022-03-31 DIAGNOSIS — F90.2 ADHD (ATTENTION DEFICIT HYPERACTIVITY DISORDER), COMBINED TYPE: Primary | ICD-10-CM

## 2022-03-31 DIAGNOSIS — F41.1 GENERALIZED ANXIETY DISORDER: ICD-10-CM

## 2022-03-31 DIAGNOSIS — Z23 ENCOUNTER FOR IMMUNIZATION: ICD-10-CM

## 2022-03-31 DIAGNOSIS — F90.2 ADHD (ATTENTION DEFICIT HYPERACTIVITY DISORDER), COMBINED TYPE: ICD-10-CM

## 2022-03-31 DIAGNOSIS — Z13.31 POSITIVE DEPRESSION SCREENING: ICD-10-CM

## 2022-03-31 DIAGNOSIS — E66.09 CLASS 1 OBESITY DUE TO EXCESS CALORIES WITH SERIOUS COMORBIDITY AND BODY MASS INDEX (BMI) OF 33.0 TO 33.9 IN ADULT: ICD-10-CM

## 2022-03-31 PROCEDURE — 99214 OFFICE O/P EST MOD 30 MIN: CPT | Performed by: NURSE PRACTITIONER

## 2022-03-31 NOTE — PROGRESS NOTES
Chief Complaint   Patient presents with    Follow-up     ADHD, depression   1. Have you been to the ER, urgent care clinic since your last visit? Hospitalized since your last visit? No    2. Have you seen or consulted any other health care providers outside of the [address] [place] [address] since your last visit? Include any pap smears or colon screening.  No   3 most recent PHQ Screens 3/31/2022   PHQ Not Done -   Little interest or pleasure in doing things Several days   Feeling down, depressed, irritable, or hopeless More than half the days   Total Score PHQ 2 3   Trouble falling or staying asleep, or sleeping too much More than half the days   Feeling tired or having little energy More than half the days   Poor appetite, weight loss, or overeating More than half the days   Feeling bad about yourself - or that you are a failure or have let yourself or your family down Several days   Trouble concentrating on things such as school, work, reading, or watching TV Not at all   Moving or speaking so slowly that other people could have noticed; or the opposite being so fidgety that others notice Not at all   Thoughts of being better off dead, or hurting yourself in some way Not at all   PHQ 9 Score 10   How difficult have these problems made it for you to do your work, take care of your home and get along with others Somewhat difficult     Visit Vitals  /84 (BP 1 Location: Left upper arm, BP Patient Position: Sitting)   Pulse 89   Temp 97.3 °F (36.3 °C) (Temporal)   Resp 16   LMP 05/19/2015 (Approximate)   SpO2 98%

## 2022-03-31 NOTE — PROGRESS NOTES
Subjective  Chief Complaint   Patient presents with    Follow-up     ADHD, depression     HPI:  Gigi Calhoun is a 39 y.o. female. Patient presents for management of ADHD and anxiety. Depression screening is positive and we will address this today. Feels moods have been more labile recently. Attributes this to frustration with weight gain. Has tried calorie counting and inconsistent exercise. Any slips off diet cause frustration and depression.      Work Performance: not employed, focus is more difficult on days anxiety is elevated  Organization: good  Appetite: normal, admits to binge eating, declines weight today  Mood: some anxiety and depression about weight  Sleep: fair, chronic, not tired during the day  Friends: well connected with peers  Family: no new stressors  Self esteem: low due to weight      Past Medical History:   Diagnosis Date    ADHD (attention deficit hyperactivity disorder)     Anxiety     Asthma     CHILDHOOD    Chronic back pain     Chronic pain     DISC ISSUES IN BACK    PTSD (post-traumatic stress disorder)      Family History   Problem Relation Age of Onset    Anxiety Mother     Hypertension Mother     COPD Mother     OSTEOARTHRITIS Mother     Psychiatric Disorder Mother     Hypertension Father     Cancer Brother     Schizophrenia Maternal Aunt     Heart Disease Maternal Grandfather     Stroke Maternal Grandfather     Cancer Paternal Grandmother     Anesth Problems Neg Hx      Social History     Socioeconomic History    Marital status:      Spouse name: Not on file    Number of children: Not on file    Years of education: Not on file    Highest education level: Not on file   Occupational History    Not on file   Tobacco Use    Smoking status: Former Smoker     Packs/day: 0.25     Years: 19.00     Pack years: 4.75     Quit date: 2019     Years since quittin.9    Smokeless tobacco: Current User    Tobacco comment: smokes vapor cig    Vaping Use    Vaping Use: Every day    Substances: Nicotine (lowest dose)    Devices: Pre-filled pod   Substance and Sexual Activity    Alcohol use: Yes     Alcohol/week: 0.0 standard drinks     Comment: Socially.  Drug use: No    Sexual activity: Yes     Partners: Male     Birth control/protection: Surgical   Other Topics Concern    Not on file   Social History Narrative    Not on file     Social Determinants of Health     Financial Resource Strain:     Difficulty of Paying Living Expenses: Not on file   Food Insecurity:     Worried About Running Out of Food in the Last Year: Not on file    [place] of Food in the Last Year: Not on file   Transportation Needs:     Lack of Transportation (Medical): Not on file    Lack of Transportation (Non-Medical):  Not on file   Physical Activity:     Days of Exercise per Week: Not on file    Minutes of Exercise per Session: Not on file   Stress:     Feeling of Stress : Not on file   Social Connections:     Frequency of Communication with Friends and Family: Not on file    Frequency of Social Gatherings with Friends and Family: Not on file    Attends Scientology Services: Not on file    Active Member of [address] [place] [address] or Organizations: Not on file    Attends Club or Organization Meetings: Not on file    Marital Status: Not on file   Intimate Partner Violence:     Fear of Current or Ex-Partner: Not on file    Emotionally Abused: Not on file    Physically Abused: Not on file    Sexually Abused: Not on file   Housing Stability:     Unable to Pay for Housing in the Last Year: Not on file    Number of [place] in the Last Year: Not on file    Unstable Housing in the Last Year: Not on file     Current Outpatient Medications on File Prior to Visit   Medication Sig Dispense Refill    albuterol (PROVENTIL HFA, VENTOLIN HFA, PROAIR HFA) 90 mcg/actuation inhaler INHALE 2 PUFFS BY MOUTH EVERY 4 HOURS AS NEEDED FOR WHEEZE 18 Each 0    loratadine (Claritin) 10 mg tablet Take 1 Tablet by mouth daily. 90 Tablet 3    Vyvanse 70 mg cap TK 1 C PO every day 30 Capsule 0    hydrOXYzine HCL (ATARAX) 50 mg tablet Take 1 Tablet by mouth every eight (8) hours as needed for Anxiety. 10 Tablet 5    citalopram (CELEXA) 40 mg tablet Take 1 Tablet by mouth daily. 90 Tablet 3    lansoprazole (Prevacid) 15 mg capsule Take 15 mg by mouth Daily (before breakfast).  ibuprofen (MOTRIN) 800 mg tablet TAKE 1 TABLET BY MOUTH EVERY 6 HOURS AS NEEDED FOR PAIN. TAKE WITH FOOD. 90 Tablet 0    acetaminophen (Tylenol Extra Strength) 500 mg tablet Take 500 mg by mouth every six (6) hours as needed for Pain. OTC      [DISCONTINUED] hydrocortisone (ANUSOL-HC) 2.5 % rectal cream Insert  into rectum two (2) times a day. With applicator tip twice a day (Patient not taking: Reported on 3/31/2022) 30 g 0    [DISCONTINUED] fluticasone propionate (FLONASE) 50 mcg/actuation nasal spray 2 Sprays by Both Nostrils route daily. (Patient not taking: Reported on 3/31/2022) 3 Each 3     No current facility-administered medications on file prior to visit. Allergies   Allergen Reactions    Other Food Itching     Fresh fruit, cannot be specific    Codeine Hives     Allergic to tylenol with codeine combo    Tylenol-Codeine #2 Hives    Nuts [Tree Nut] Itching     ROS  See HPI for pertinent ROS. Objective  Visit Vitals  /84 (BP 1 Location: Left upper arm, BP Patient Position: Sitting)   Pulse 89   Temp 97.3 °F (36.3 °C) (Temporal)   Resp 16   SpO2 98%       Physical Exam  Vitals and nursing note reviewed. Constitutional:       General: She is not in acute distress. Appearance: Normal appearance. She is obese. HENT:      Head: Normocephalic. Eyes:      Extraocular Movements: Extraocular movements intact. Cardiovascular:      Rate and Rhythm: Normal rate and regular rhythm. Heart sounds: Normal heart sounds. Pulmonary:      Effort: Pulmonary effort is normal.      Breath sounds: Normal breath sounds. Musculoskeletal:         General: Normal range of motion. Right lower leg: No edema. Left lower leg: No edema. Skin:     General: Skin is warm and dry. Neurological:      Mental Status: She is alert and oriented to person, place, and time. Psychiatric:         Mood and Affect: Mood normal.         Behavior: Behavior normal.          Assessment & Plan      ICD-10-CM ICD-9-CM    1. ADHD (attention deficit hyperactivity disorder), combined type  F90.2 314.01    2. Generalized anxiety disorder  F41.1 300.02    3. Positive depression screening  Z13.31 796.4    4. Class 1 obesity due to excess calories with serious comorbidity and body mass index (BMI) of 33.0 to 33.9 in adult  E66.09 278.00     Z68.33 V85.33    5. Encounter for immunization  Z23 V03.89      Diagnoses and all orders for this visit:    1. ADHD (attention deficit hyperactivity disorder), combined type  ADHD remains well controlled on current medication and dose. No changes today. 2. Generalized anxiety disorder  Well controlled with Celexa daily and hydroxyzine as needed. Declines medication adjustment today. Reminded to take Celexa daily and do not abruptly discontinue. 3. Positive depression screening  Declines medication adjustment today. Attributes symptoms to low self-esteem due to body image. 4. Class 1 obesity due to excess calories with serious comorbidity and body mass index (BMI) of 33.0 to 33.9 in adult  Weight gain and difficulty losing weight over the past few years is affecting her moods. She has been unsuccessful and inconsistent in the past.  We discussed medication management as an option to help. Before starting medication she is going to keep a food and exercise journal for the next 4 to 6 weeks. She will also verify medication coverage with her insurance company. 5. Encounter for immunization  Reminded to receive COVID booster from pharmacy.     Follow-up and Dispositions    · Return in about 1 month (around 4/30/2022) for follow up, weight, depression.            Charles Harris, NP

## 2022-04-01 RX ORDER — LISDEXAMFETAMINE DIMESYLATE 70 MG/1
CAPSULE ORAL
Qty: 30 CAPSULE | Refills: 0 | Status: SHIPPED | OUTPATIENT
Start: 2022-04-01 | End: 2022-04-27 | Stop reason: SDUPTHER

## 2022-04-27 DIAGNOSIS — F90.2 ADHD (ATTENTION DEFICIT HYPERACTIVITY DISORDER), COMBINED TYPE: ICD-10-CM

## 2022-04-28 RX ORDER — LISDEXAMFETAMINE DIMESYLATE 70 MG/1
CAPSULE ORAL
Qty: 30 CAPSULE | Refills: 0 | Status: SHIPPED | OUTPATIENT
Start: 2022-04-29 | End: 2022-05-24 | Stop reason: SDUPTHER

## 2022-05-04 DIAGNOSIS — Z87.09 HISTORY OF ASTHMA: ICD-10-CM

## 2022-05-05 RX ORDER — ALBUTEROL SULFATE 90 UG/1
AEROSOL, METERED RESPIRATORY (INHALATION)
Qty: 18 EACH | Refills: 0 | Status: SHIPPED | OUTPATIENT
Start: 2022-05-05 | End: 2022-08-02 | Stop reason: SDUPTHER

## 2022-05-24 DIAGNOSIS — F90.2 ADHD (ATTENTION DEFICIT HYPERACTIVITY DISORDER), COMBINED TYPE: ICD-10-CM

## 2022-05-25 RX ORDER — LISDEXAMFETAMINE DIMESYLATE 70 MG/1
CAPSULE ORAL
Qty: 30 CAPSULE | Refills: 0 | Status: SHIPPED | OUTPATIENT
Start: 2022-05-28 | End: 2022-06-23 | Stop reason: SDUPTHER

## 2022-07-21 DIAGNOSIS — F90.2 ADHD (ATTENTION DEFICIT HYPERACTIVITY DISORDER), COMBINED TYPE: ICD-10-CM

## 2022-07-21 DIAGNOSIS — F41.1 GENERALIZED ANXIETY DISORDER: ICD-10-CM

## 2022-07-22 RX ORDER — LISDEXAMFETAMINE DIMESYLATE 70 MG/1
CAPSULE ORAL
Qty: 30 CAPSULE | Refills: 0 | Status: SHIPPED | OUTPATIENT
Start: 2022-07-23 | End: 2022-08-17 | Stop reason: SDUPTHER

## 2022-07-22 RX ORDER — HYDROXYZINE 50 MG/1
50 TABLET, FILM COATED ORAL
Qty: 10 TABLET | Refills: 0 | Status: SHIPPED | OUTPATIENT
Start: 2022-07-22 | End: 2022-08-17 | Stop reason: SDUPTHER

## 2022-08-17 DIAGNOSIS — F90.2 ADHD (ATTENTION DEFICIT HYPERACTIVITY DISORDER), COMBINED TYPE: ICD-10-CM

## 2022-08-17 DIAGNOSIS — F41.1 GENERALIZED ANXIETY DISORDER: ICD-10-CM

## 2022-08-18 RX ORDER — LISDEXAMFETAMINE DIMESYLATE 70 MG/1
CAPSULE ORAL
Qty: 30 CAPSULE | Refills: 0 | Status: SHIPPED | OUTPATIENT
Start: 2022-08-20 | End: 2022-09-18 | Stop reason: SDUPTHER

## 2022-08-18 RX ORDER — HYDROXYZINE 50 MG/1
50 TABLET, FILM COATED ORAL
Qty: 10 TABLET | Refills: 0 | Status: SHIPPED | OUTPATIENT
Start: 2022-08-20 | End: 2022-09-12

## 2022-08-22 ENCOUNTER — PATIENT MESSAGE (OUTPATIENT)
Dept: FAMILY MEDICINE CLINIC | Age: 42
End: 2022-08-22

## 2022-08-22 NOTE — TELEPHONE ENCOUNTER
From: Clarisse Erazo  To: Enrique Huertas NP  Sent: 8/22/2022 9:30 AM EDT  Subject: Prior authorization     Just wanted to make sure you received prior authorization request from pharmacy for Vyvanse. Thank you!

## 2022-08-31 ENCOUNTER — OFFICE VISIT (OUTPATIENT)
Dept: FAMILY MEDICINE CLINIC | Age: 42
End: 2022-08-31
Payer: MEDICAID

## 2022-08-31 VITALS
HEART RATE: 103 BPM | DIASTOLIC BLOOD PRESSURE: 92 MMHG | TEMPERATURE: 97.2 F | OXYGEN SATURATION: 100 % | SYSTOLIC BLOOD PRESSURE: 130 MMHG | RESPIRATION RATE: 16 BRPM

## 2022-08-31 DIAGNOSIS — Z79.899 LONG-TERM CURRENT USE OF STIMULANT: ICD-10-CM

## 2022-08-31 DIAGNOSIS — Z79.899 CONTROLLED SUBSTANCE AGREEMENT SIGNED: ICD-10-CM

## 2022-08-31 DIAGNOSIS — R03.0 ELEVATED BLOOD PRESSURE READING WITHOUT DIAGNOSIS OF HYPERTENSION: ICD-10-CM

## 2022-08-31 DIAGNOSIS — F90.2 ADHD (ATTENTION DEFICIT HYPERACTIVITY DISORDER), COMBINED TYPE: Primary | ICD-10-CM

## 2022-08-31 DIAGNOSIS — Z53.20: ICD-10-CM

## 2022-08-31 PROCEDURE — 99214 OFFICE O/P EST MOD 30 MIN: CPT | Performed by: NURSE PRACTITIONER

## 2022-08-31 NOTE — PROGRESS NOTES
Subjective  Chief Complaint   Patient presents with    Medication Refill     HPI:  Grecia Moore is a 43 y.o. female. Patient presents for management of ADHD. Insurance is paying for [email or web address]. She has started exercising with weight machines and stationary bike. Daily Performance: no issues  Organization: good   Appetite: normal, no binge eating over the past few weeks  Mood: stable, denies irritability and anger  Sleep: good, not tired during the day  Friends: well connected with peers  Family: no new stressors  Self esteem: not a concern      Past Medical History:   Diagnosis Date    ADHD (attention deficit hyperactivity disorder)     Anxiety     Asthma     CHILDHOOD    Chronic back pain     Chronic pain     DISC ISSUES IN BACK    PTSD (post-traumatic stress disorder)      Family History   Problem Relation Age of Onset    Anxiety Mother     Hypertension Mother     COPD Mother     OSTEOARTHRITIS Mother     Psychiatric Disorder Mother     Hypertension Father     Cancer Brother     Schizophrenia Maternal Aunt     Heart Disease Maternal Grandfather     Stroke Maternal Grandfather     Cancer Paternal Grandmother     Anesth Problems Neg Hx      Social History     Socioeconomic History    Marital status:      Spouse name: Not on file    Number of children: Not on file    Years of education: Not on file    Highest education level: Not on file   Occupational History    Not on file   Tobacco Use    Smoking status: Former     Packs/day: 0.25     Years: 19.00     Pack years: 4.75     Types: Cigarettes     Quit date: 05/2019     Years since quitting: 3.3    Smokeless tobacco: Current    Tobacco comments:     smokes vapor cig    Vaping Use    Vaping Use: Every day    Substances: Nicotine (lowest dose)    Devices: Pre-filled pod   Substance and Sexual Activity    Alcohol use: Yes     Alcohol/week: 0.0 standard drinks     Comment: Socially.      Drug use: No    Sexual activity: Yes     Partners: Male     Birth control/protection: Surgical   Other Topics Concern    Not on file   Social History Narrative    Not on file     Social Determinants of Health     Financial Resource Strain: Low Risk     Difficulty of Paying Living Expenses: Not hard at all   Food Insecurity: No Food Insecurity    Worried About Running Out of Food in the Last Year: Never true    Ran Out of Food in the Last Year: Never true   Transportation Needs: Not on file   Physical Activity: Not on file   Stress: Not on file   Social Connections: Not on file   Intimate Partner Violence: Not on file   Housing Stability: Not on file     Current Outpatient Medications on File Prior to Visit   Medication Sig Dispense Refill    hydrOXYzine HCL (ATARAX) 50 mg tablet Take 1 Tablet by mouth every eight (8) hours as needed for Anxiety. 10 Tablet 0    Vyvanse 70 mg cap TK 1 C PO every day 30 Capsule 0    citalopram (CELEXA) 40 mg tablet Take 1 Tablet by mouth in the morning. 90 Tablet 3    albuterol (PROVENTIL HFA, VENTOLIN HFA, PROAIR HFA) 90 mcg/actuation inhaler Take 2 Puffs by inhalation every four (4) hours as needed for Wheezing. 18 Each 0    loratadine (Claritin) 10 mg tablet Take 1 Tablet by mouth daily. 90 Tablet 3    lansoprazole (PREVACID) 15 mg capsule Take 15 mg by mouth Daily (before breakfast). ibuprofen (MOTRIN) 800 mg tablet TAKE 1 TABLET BY MOUTH EVERY 6 HOURS AS NEEDED FOR PAIN. TAKE WITH FOOD. 90 Tablet 0    acetaminophen (TYLENOL) 500 mg tablet Take 500 mg by mouth every six (6) hours as needed for Pain. OTC       No current facility-administered medications on file prior to visit.      Allergies   Allergen Reactions    Other Food Itching     Fresh fruit, cannot be specific    Codeine Hives     Allergic to tylenol with codeine combo    Tylenol-Codeine #2 Hives    Nuts [Tree Nut] Itching         Objective  Visit Vitals  BP (!) 130/92   Pulse (!) 103   Temp 97.2 °F (36.2 °C) (Temporal)   Resp 16   SpO2 100%       Physical Exam  Vitals and nursing note reviewed. Constitutional:       General: She is not in acute distress. Appearance: Normal appearance. She is obese. HENT:      Head: Normocephalic. Eyes:      Extraocular Movements: Extraocular movements intact. Cardiovascular:      Rate and Rhythm: Normal rate and regular rhythm. Heart sounds: Normal heart sounds. Pulmonary:      Effort: Pulmonary effort is normal.      Breath sounds: Normal breath sounds. Musculoskeletal:         General: Normal range of motion. Right lower leg: No edema. Left lower leg: No edema. Skin:     General: Skin is warm and dry. Neurological:      Mental Status: She is alert and oriented to person, place, and time. Psychiatric:         Mood and Affect: Mood normal.         Behavior: Behavior normal.        Assessment & Plan      ICD-10-CM ICD-9-CM    1. ADHD (attention deficit hyperactivity disorder), combined type  F90.2 314.01 TOXASSURE SELECT 13 (MW)      2. Long-term current use of stimulant  Z79.899 V58.69 TOXASSURE SELECT 13 (MW)      3. Controlled substance agreement signed  Z79.899 V58.69       4. Elevated blood pressure reading without diagnosis of hypertension  R03.0 796.2       5. Body weight measurement declined  Z53.20 V64.2         Diagnoses and all orders for this visit:    1. ADHD (attention deficit hyperactivity disorder), combined type  Well controlled on current medication and dose. No changes today. Medication last refilled 8/18/2022. Updated ADHD policy reviewed and signed. -     Shandra Gemma 13 (MW)    2. Long-term current use of stimulant  Initial drug screen completed today. -     Shandra Gemma 13 (MW)    3. Controlled substance agreement signed  Policy scanned into chart and a copy was provided to the patient. 4. Elevated blood pressure reading without diagnosis of hypertension  BP is above goal in the office today.  she is going to check readings outside the office after sitting quietly for 5 minutes with both feet flat on the floor and arm at heart level. Instructed to call if staying consistently greater than 140/90 on either number. In the meantime, stay hydrated, lower salt in diet and increase exercise. 5. Body weight measurement declined  Declines to have weight checked today. Working hard on increasing exercise for weight loss. Aspects of this note may have been generated using voice recognition software. Despite editing, there may be some syntax errors. Follow-up and Dispositions    Return in about 4 months (around 12/31/2022) for follow up, adhd, anxiety, weight, nonfasting. I have discussed the diagnosis with the patient and the intended plan as seen in the above orders. The patient has received an after-visit summary and questions were answered concerning future plans. I have discussed medication side effects and warnings with the patient as well.     Monet Herrera NP

## 2022-08-31 NOTE — PROGRESS NOTES
Chief Complaint   Patient presents with    Medication Refill    1. \"Have you been to the ER, urgent care clinic since your last visit? Hospitalized since your last visit? \" No    2. \"Have you seen or consulted any other health care providers outside of the [address] [place] [address] since your last visit? \" No     3. For patients aged 39-70: Has the patient had a colonoscopy / FIT/ Cologuard? NA - based on age      If the patient is female:    4. For patients aged 41-77: Has the patient had a mammogram within the past 2 years? no      5. For patients aged 21-65: Has the patient had a pap smear?  No Visit Vitals  BP (!) 132/96 (BP 1 Location: Left upper arm, BP Patient Position: Sitting, BP Cuff Size: Adult)   Pulse (!) 106   Temp 97.2 °F (36.2 °C) (Temporal)   Resp 16   LMP 05/19/2015 (Approximate)   SpO2 100%      3 most recent PHQ Screens 8/31/2022   PHQ Not Done -   Little interest or pleasure in doing things Not at all   Feeling down, depressed, irritable, or hopeless Not at all   Total Score PHQ 2 0   Trouble falling or staying asleep, or sleeping too much -   Feeling tired or having little energy -   Poor appetite, weight loss, or overeating -   Feeling bad about yourself - or that you are a failure or have let yourself or your family down -   Trouble concentrating on things such as school, work, reading, or watching TV -   Moving or speaking so slowly that other people could have noticed; or the opposite being so fidgety that others notice -   Thoughts of being better off dead, or hurting yourself in some way -   PHQ 9 Score -   How difficult have these problems made it for you to do your work, take care of your home and get along with others -

## 2022-09-06 LAB — DRUGS UR: NORMAL

## 2022-09-11 DIAGNOSIS — F41.1 GENERALIZED ANXIETY DISORDER: ICD-10-CM

## 2022-09-12 RX ORDER — HYDROXYZINE 50 MG/1
TABLET, FILM COATED ORAL
Qty: 10 TABLET | Refills: 0 | Status: SHIPPED | OUTPATIENT
Start: 2022-09-12 | End: 2022-10-04 | Stop reason: SDUPTHER

## 2022-09-18 DIAGNOSIS — F90.2 ADHD (ATTENTION DEFICIT HYPERACTIVITY DISORDER), COMBINED TYPE: ICD-10-CM

## 2022-09-19 RX ORDER — LISDEXAMFETAMINE DIMESYLATE 70 MG/1
CAPSULE ORAL
Qty: 30 CAPSULE | Refills: 0 | Status: SHIPPED | OUTPATIENT
Start: 2022-09-19 | End: 2022-10-16 | Stop reason: SDUPTHER

## 2022-09-28 DIAGNOSIS — Z87.09 HISTORY OF ASTHMA: ICD-10-CM

## 2022-09-29 RX ORDER — ALBUTEROL SULFATE 90 UG/1
AEROSOL, METERED RESPIRATORY (INHALATION)
Qty: 18 G | Refills: 0 | Status: SHIPPED | OUTPATIENT
Start: 2022-09-29

## 2022-10-16 DIAGNOSIS — F90.2 ADHD (ATTENTION DEFICIT HYPERACTIVITY DISORDER), COMBINED TYPE: ICD-10-CM

## 2022-10-17 RX ORDER — LISDEXAMFETAMINE DIMESYLATE 70 MG/1
CAPSULE ORAL
Qty: 30 CAPSULE | Refills: 0 | Status: SHIPPED | OUTPATIENT
Start: 2022-10-17

## 2022-10-19 ENCOUNTER — OFFICE VISIT (OUTPATIENT)
Dept: FAMILY MEDICINE CLINIC | Age: 42
End: 2022-10-19
Payer: MEDICAID

## 2022-10-19 VITALS
SYSTOLIC BLOOD PRESSURE: 128 MMHG | BODY MASS INDEX: 37.05 KG/M2 | WEIGHT: 222.4 LBS | HEIGHT: 65 IN | HEART RATE: 105 BPM | DIASTOLIC BLOOD PRESSURE: 90 MMHG | TEMPERATURE: 97.5 F | OXYGEN SATURATION: 96 %

## 2022-10-19 DIAGNOSIS — R03.0 ELEVATED BLOOD PRESSURE READING WITHOUT DIAGNOSIS OF HYPERTENSION: ICD-10-CM

## 2022-10-19 DIAGNOSIS — K59.09 OTHER CONSTIPATION: ICD-10-CM

## 2022-10-19 DIAGNOSIS — K42.9 UMBILICAL HERNIA WITHOUT OBSTRUCTION AND WITHOUT GANGRENE: Primary | ICD-10-CM

## 2022-10-19 PROCEDURE — 99214 OFFICE O/P EST MOD 30 MIN: CPT | Performed by: NURSE PRACTITIONER

## 2022-10-19 NOTE — PROGRESS NOTES
Subjective  Chief Complaint   Patient presents with    Follow-up     Lump on stomach above belly button     HPI:  Angelo Castillo is a 43 y.o. female. Patient presents with complaint of lump above belly button 2 days ago. Associated symptoms include bloating, constipation, and abdominal fullness for the past 1-2 weeks. Fullness is causing heartburn and difficulty taking a deep breath. Inhaler is not helping. Symptoms are worse after eating. BM improves symptoms. Denies known abdominal injury and heavy lifting. She has been using OTC laxative and suppository for constipation. Lump is not causing pain or discomfort. Past Medical History:   Diagnosis Date    ADHD (attention deficit hyperactivity disorder)     Anxiety     Asthma     CHILDHOOD    Chronic back pain     Chronic pain     DISC ISSUES IN BACK    PTSD (post-traumatic stress disorder)      Family History   Problem Relation Age of Onset    Anxiety Mother     Hypertension Mother     COPD Mother     OSTEOARTHRITIS Mother     Psychiatric Disorder Mother     Hypertension Father     Cancer Brother     Schizophrenia Maternal Aunt     Heart Disease Maternal Grandfather     Stroke Maternal Grandfather     Cancer Paternal Grandmother     Anesth Problems Neg Hx      Social History     Socioeconomic History    Marital status:      Spouse name: Not on file    Number of children: Not on file    Years of education: Not on file    Highest education level: Not on file   Occupational History    Not on file   Tobacco Use    Smoking status: Former     Packs/day: 0.25     Years: 19.00     Pack years: 4.75     Types: Cigarettes     Quit date: 05/2019     Years since quitting: 3.4    Smokeless tobacco: Current    Tobacco comments:     smokes vapor cig    Vaping Use    Vaping Use: Every day    Substances: Nicotine (lowest dose)    Devices: Pre-filled pod   Substance and Sexual Activity    Alcohol use: Yes     Alcohol/week: 0.0 standard drinks     Comment: Socially.      Drug use: No    Sexual activity: Yes     Partners: Male     Birth control/protection: Surgical   Other Topics Concern    Not on file   Social History Narrative    Not on file     Social Determinants of Health     Financial Resource Strain: Low Risk     Difficulty of Paying Living Expenses: Not hard at all   Food Insecurity: No Food Insecurity    Worried About Running Out of Food in the Last Year: Never true    Ran Out of Food in the Last Year: Never true   Transportation Needs: Not on file   Physical Activity: Not on file   Stress: Not on file   Social Connections: Not on file   Intimate Partner Violence: Not on file   Housing Stability: Not on file     Current Outpatient Medications on File Prior to Visit   Medication Sig Dispense Refill    Vyvanse 70 mg cap TK 1 C PO every day 30 Capsule 0    hydrOXYzine HCL (ATARAX) 50 mg tablet Take 1 Tablet by mouth three (3) times daily as needed for Anxiety. 10 Tablet 5    albuterol (PROVENTIL HFA, VENTOLIN HFA, PROAIR HFA) 90 mcg/actuation inhaler inhale 2 puffs by mouth and INTO THE LUNGS every 4 hours if needed for wheezing 18 g 0    citalopram (CELEXA) 40 mg tablet Take 1 Tablet by mouth in the morning. 90 Tablet 3    loratadine (Claritin) 10 mg tablet Take 1 Tablet by mouth daily. 90 Tablet 3    lansoprazole (PREVACID) 15 mg capsule Take 15 mg by mouth Daily (before breakfast). ibuprofen (MOTRIN) 800 mg tablet TAKE 1 TABLET BY MOUTH EVERY 6 HOURS AS NEEDED FOR PAIN. TAKE WITH FOOD. 90 Tablet 0    acetaminophen (TYLENOL) 500 mg tablet Take 500 mg by mouth every six (6) hours as needed for Pain. OTC       No current facility-administered medications on file prior to visit.      Allergies   Allergen Reactions    Other Food Itching     Fresh fruit, cannot be specific    Codeine Hives     Allergic to tylenol with codeine combo    Tylenol-Codeine #2 Hives    Nuts [Tree Nut] Itching         Objective  Visit Vitals  BP (!) 128/90 (BP 1 Location: Left arm, BP Patient Position: Sitting, BP Cuff Size: Adult)   Pulse (!) 105   Temp 97.5 °F (36.4 °C) (Temporal)   Ht 5' 5\" (1.651 m)   Wt 222 lb 6.4 oz (100.9 kg)   SpO2 96%   BMI 37.01 kg/m²       Physical Exam  Vitals and nursing note reviewed. Constitutional:       General: She is not in acute distress. Appearance: Normal appearance. She is obese. HENT:      Head: Normocephalic. Eyes:      Extraocular Movements: Extraocular movements intact. Cardiovascular:      Rate and Rhythm: Normal rate and regular rhythm. Heart sounds: Normal heart sounds. Pulmonary:      Effort: Pulmonary effort is normal.      Breath sounds: Normal breath sounds. Abdominal:       Musculoskeletal:         General: Normal range of motion. Right lower leg: No edema. Left lower leg: No edema. Skin:     General: Skin is warm and dry. Neurological:      Mental Status: She is alert and oriented to person, place, and time. Psychiatric:         Mood and Affect: Mood normal.         Behavior: Behavior normal.        Assessment & Plan      ICD-10-CM ICD-9-CM    1. Umbilical hernia without obstruction and without gangrene  K42.9 553.1 US ABD LTD      2. Other constipation  K59.09 564.09 linaCLOtide (LINZESS) 145 mcg cap capsule      3. Elevated blood pressure reading without diagnosis of hypertension  R03.0 796.2         Diagnoses and all orders for this visit:    1. Umbilical hernia without obstruction and without gangrene  Imaging as ordered. Seek care at ED for increasing pain, discoloration, or non reducible hernia. -     US ABD LTD; Future    2. Other constipation  Medication as ordered. Also has an indication for IBS with constipation. Increase fluid intake, exercise, and fiber intake. -     linaCLOtide (LINZESS) 145 mcg cap capsule; Take 1 Capsule by mouth Daily (before breakfast). 3. Elevated blood pressure reading without diagnosis of hypertension  BP is above goal in the office today.  she is going to check readings outside the office after sitting quietly for 5 minutes with both feet flat on the floor and arm at heart level. Instructed to call if staying consistently greater than 140/90 on either number. In the meantime, stay hydrated, lower salt in diet and increase exercise. Aspects of this note may have been generated using voice recognition software. Despite editing, there may be some syntax errors. Follow-up and Dispositions    Return for as scheduled 1/3/2022. I have discussed the diagnosis with the patient and the intended plan as seen in the above orders. The patient has received an after-visit summary and questions were answered concerning future plans. I have discussed medication side effects and warnings with the patient as well.     Nevin Guerrero NP

## 2022-10-19 NOTE — PROGRESS NOTES
Chief Complaint   Patient presents with    Follow-up     Lump on stomach above belly button   Visit Vitals  BP (!) 128/90 (BP 1 Location: Left arm, BP Patient Position: Sitting, BP Cuff Size: Adult)   Pulse (!) 105   Temp 97.5 °F (36.4 °C) (Temporal)   Ht 5' 5\" (1.651 m)   Wt 222 lb 6.4 oz (100.9 kg)   LMP 05/19/2015 (Approximate)   SpO2 96%   BMI 37.01 kg/m²       1. \"Have you been to the ER, urgent care clinic since your last visit? Hospitalized since your last visit? \" No    2. \"Have you seen or consulted any other health care providers outside of the [address] [place] [address] since your last visit? \" No     3. For patients aged 39-70: Has the patient had a colonoscopy / FIT/ Cologuard? Yes - no Care Gap present      If the patient is female:    4. For patients aged 41-77: Has the patient had a mammogram within the past 2 years? Yes - no Care Gap present      5. For patients aged 21-65: Has the patient had a pap smear?  Yes - no Care Gap present  3 most recent PHQ Screens 10/19/2022   PHQ Not Done -   Little interest or pleasure in doing things Not at all   Feeling down, depressed, irritable, or hopeless Not at all   Total Score PHQ 2 0   Trouble falling or staying asleep, or sleeping too much -   Feeling tired or having little energy -   Poor appetite, weight loss, or overeating -   Feeling bad about yourself - or that you are a failure or have let yourself or your family down -   Trouble concentrating on things such as school, work, reading, or watching TV -   Moving or speaking so slowly that other people could have noticed; or the opposite being so fidgety that others notice -   Thoughts of being better off dead, or hurting yourself in some way -   PHQ 9 Score -   How difficult have these problems made it for you to do your work, take care of your home and get along with others -

## 2022-10-24 ENCOUNTER — HOSPITAL ENCOUNTER (OUTPATIENT)
Dept: ULTRASOUND IMAGING | Age: 42
Discharge: HOME | End: 2022-10-24
Payer: MEDICAID

## 2022-10-24 ENCOUNTER — PATIENT MESSAGE (OUTPATIENT)
Dept: FAMILY MEDICINE CLINIC | Age: 42
End: 2022-10-24

## 2022-10-24 DIAGNOSIS — K42.9 UMBILICAL HERNIA WITHOUT OBSTRUCTION AND WITHOUT GANGRENE: ICD-10-CM

## 2022-10-24 DIAGNOSIS — K42.9 UMBILICAL HERNIA WITHOUT OBSTRUCTION AND WITHOUT GANGRENE: Primary | ICD-10-CM

## 2022-10-24 PROCEDURE — 76705 ECHO EXAM OF ABDOMEN: CPT

## 2022-10-25 NOTE — TELEPHONE ENCOUNTER
From: Wendy Lanier  To: Denice Harrell NP  Sent: 10/24/2022 8:30 PM EDT  Subject: Response     Yes, I would like to discuss with a surgeon. What are my next steps? Thank you!

## 2022-11-02 ENCOUNTER — HOSPITAL ENCOUNTER (EMERGENCY)
Age: 42
Discharge: HOME | End: 2022-11-02
Attending: EMERGENCY MEDICINE
Payer: MEDICAID

## 2022-11-02 ENCOUNTER — APPOINTMENT (OUTPATIENT)
Dept: GENERAL RADIOLOGY | Age: 42
End: 2022-11-02
Attending: EMERGENCY MEDICINE
Payer: MEDICAID

## 2022-11-02 VITALS
WEIGHT: 210 LBS | HEART RATE: 75 BPM | DIASTOLIC BLOOD PRESSURE: 72 MMHG | SYSTOLIC BLOOD PRESSURE: 107 MMHG | HEIGHT: 65 IN | RESPIRATION RATE: 16 BRPM | OXYGEN SATURATION: 98 % | BODY MASS INDEX: 34.99 KG/M2 | TEMPERATURE: 97.7 F

## 2022-11-02 DIAGNOSIS — R10.9 LEFT FLANK PAIN: Primary | ICD-10-CM

## 2022-11-02 LAB
ALBUMIN SERPL-MCNC: 3.8 G/DL (ref 3.5–5.2)
ALBUMIN/GLOB SERPL: 1.6 {RATIO} (ref 1.1–2.2)
ALP SERPL-CCNC: 79 U/L (ref 35–104)
ALT SERPL-CCNC: 12 U/L (ref 10–35)
ANION GAP SERPL CALC-SCNC: 8 MMOL/L (ref 5–15)
APPEARANCE UR: ABNORMAL
AST SERPL-CCNC: 14 U/L (ref 10–35)
BACTERIA URNS QL MICRO: ABNORMAL /HPF
BASOPHILS # BLD: 0 K/UL (ref 0–0.1)
BASOPHILS NFR BLD: 1 % (ref 0–1)
BILIRUB SERPL-MCNC: 0.2 MG/DL (ref 0.2–1)
BILIRUB UR QL: NEGATIVE
BUN SERPL-MCNC: 13 MG/DL (ref 6–20)
BUN/CREAT SERPL: 18 (ref 12–20)
CALCIUM SERPL-MCNC: 8.6 MG/DL (ref 8.6–10)
CHLORIDE SERPL-SCNC: 106 MMOL/L (ref 98–107)
CO2 SERPL-SCNC: 26 MMOL/L (ref 22–29)
COLOR UR: ABNORMAL
CREAT SERPL-MCNC: 0.71 MG/DL (ref 0.5–0.9)
D DIMER PPP FEU-MCNC: 0.48 UG/ML(FEU)
DIFFERENTIAL METHOD BLD: NORMAL
EOSINOPHIL # BLD: 0.1 K/UL (ref 0–0.4)
EOSINOPHIL NFR BLD: 2 % (ref 0–7)
EPITH CASTS URNS QL MICRO: ABNORMAL /LPF
ERYTHROCYTE [DISTWIDTH] IN BLOOD BY AUTOMATED COUNT: 13.2 % (ref 11.5–14.5)
GLOBULIN SER CALC-MCNC: 2.4 G/DL (ref 2–4)
GLUCOSE SERPL-MCNC: 101 MG/DL (ref 65–100)
GLUCOSE UR STRIP.AUTO-MCNC: NEGATIVE MG/DL
HCT VFR BLD AUTO: 35.8 % (ref 35–47)
HGB BLD-MCNC: 12.2 G/DL (ref 11.5–16)
HGB UR QL STRIP: NEGATIVE
IMM GRANULOCYTES # BLD AUTO: 0 K/UL (ref 0–0.04)
IMM GRANULOCYTES NFR BLD AUTO: 0 % (ref 0–0.5)
KETONES UR QL STRIP.AUTO: ABNORMAL MG/DL
LEUKOCYTE ESTERASE UR QL STRIP.AUTO: NEGATIVE
LIPASE SERPL-CCNC: 15 U/L (ref 13–60)
LYMPHOCYTES # BLD: 1.5 K/UL (ref 0.8–3.5)
LYMPHOCYTES NFR BLD: 28 % (ref 12–49)
MCH RBC QN AUTO: 31 PG (ref 26–34)
MCHC RBC AUTO-ENTMCNC: 34.1 G/DL (ref 30–36.5)
MCV RBC AUTO: 91.1 FL (ref 80–99)
MONOCYTES # BLD: 0.5 K/UL (ref 0–1)
MONOCYTES NFR BLD: 9 % (ref 5–13)
MUCOUS THREADS URNS QL MICRO: ABNORMAL /LPF
NEUTS SEG # BLD: 3.3 K/UL (ref 1.8–8)
NEUTS SEG NFR BLD: 61 % (ref 32–75)
NITRITE UR QL STRIP.AUTO: NEGATIVE
NRBC # BLD: 0 K/UL (ref 0–0.01)
NRBC BLD-RTO: 0 PER 100 WBC
PH UR STRIP: 6 [PH] (ref 5–8)
PLATELET # BLD AUTO: 219 K/UL (ref 150–400)
PMV BLD AUTO: 10.2 FL (ref 8.9–12.9)
POTASSIUM SERPL-SCNC: 4.1 MMOL/L (ref 3.5–5.1)
PROT SERPL-MCNC: 6.2 G/DL (ref 6.4–8.3)
PROT UR STRIP-MCNC: NEGATIVE MG/DL
RBC # BLD AUTO: 3.93 M/UL (ref 3.8–5.2)
RBC #/AREA URNS HPF: ABNORMAL /HPF
SODIUM SERPL-SCNC: 140 MMOL/L (ref 136–145)
SP GR UR REFRACTOMETRY: >1.03 (ref 1–1.03)
UR CULT HOLD, URHOLD: NORMAL
UROBILINOGEN UR QL STRIP.AUTO: 0.2 EU/DL (ref 0.2–1)
WBC # BLD AUTO: 5.4 K/UL (ref 3.6–11)
WBC URNS QL MICRO: ABNORMAL /HPF (ref 0–4)

## 2022-11-02 PROCEDURE — 83690 ASSAY OF LIPASE: CPT

## 2022-11-02 PROCEDURE — 93005 ELECTROCARDIOGRAM TRACING: CPT

## 2022-11-02 PROCEDURE — 96372 THER/PROPH/DIAG INJ SC/IM: CPT

## 2022-11-02 PROCEDURE — 85379 FIBRIN DEGRADATION QUANT: CPT

## 2022-11-02 PROCEDURE — 74011250636 HC RX REV CODE- 250/636: Performed by: EMERGENCY MEDICINE

## 2022-11-02 PROCEDURE — 81001 URINALYSIS AUTO W/SCOPE: CPT

## 2022-11-02 PROCEDURE — 99285 EMERGENCY DEPT VISIT HI MDM: CPT

## 2022-11-02 PROCEDURE — 80053 COMPREHEN METABOLIC PANEL: CPT

## 2022-11-02 PROCEDURE — 85025 COMPLETE CBC W/AUTO DIFF WBC: CPT

## 2022-11-02 PROCEDURE — 36415 COLL VENOUS BLD VENIPUNCTURE: CPT

## 2022-11-02 PROCEDURE — 71046 X-RAY EXAM CHEST 2 VIEWS: CPT

## 2022-11-02 RX ORDER — KETOROLAC TROMETHAMINE 30 MG/ML
30 INJECTION, SOLUTION INTRAMUSCULAR; INTRAVENOUS
Status: COMPLETED | OUTPATIENT
Start: 2022-11-02 | End: 2022-11-02

## 2022-11-02 RX ORDER — KETOROLAC TROMETHAMINE 30 MG/ML
15 INJECTION, SOLUTION INTRAMUSCULAR; INTRAVENOUS
Status: DISCONTINUED | OUTPATIENT
Start: 2022-11-02 | End: 2022-11-02

## 2022-11-02 RX ADMIN — KETOROLAC TROMETHAMINE 30 MG: 30 INJECTION, SOLUTION INTRAMUSCULAR; INTRAVENOUS at 20:04

## 2022-11-02 NOTE — ED PROVIDER NOTES
The history is provided by the patient. No  was used. Chest Pain   This is a new problem. The current episode started more than 2 days ago. The problem has not changed since onset. The problem occurs constantly. The pain is associated with normal activity. The pain is present in the left side. The pain is moderate. The quality of the pain is described as pleuritic. The pain does not radiate. The symptoms are aggravated by movement and deep breathing. Pertinent negatives include no abdominal pain, no back pain, no claudication, no cough, no diaphoresis, no dizziness, no exertional chest pressure, no fever, no headaches, no hemoptysis, no irregular heartbeat, no leg pain, no lower extremity edema, no malaise/fatigue, no nausea, no near-syncope, no numbness, no orthopnea, no palpitations, no PND, no shortness of breath, no sputum production, no vomiting and no weakness. She has tried OTC pain medications for the symptoms. The treatment provided no relief. Risk factors include no risk factors. Her past medical history does not include aneurysm, cancer, DM, DVT, HTN, PE or CHF.       Past Medical History:   Diagnosis Date    ADHD (attention deficit hyperactivity disorder)     Anxiety     Asthma     CHILDHOOD    Chronic back pain     Chronic pain     DISC ISSUES IN BACK    PTSD (post-traumatic stress disorder)        Past Surgical History:   Procedure Laterality Date    HX BREAST REDUCTION  2002    HX CHOLECYSTECTOMY      HX CYST REMOVAL Right     ganglion cyst removed from right wrist    HX GYN  2012    BTL    HX GYN      D&C X 2    HX GYN  2015    Hysterectomy    HX HYSTERECTOMY      HX OTHER SURGICAL Right 05/2013    Ganglion cyst removal from wrist    HX OTHER SURGICAL      rectal surgery    HX TONSIL AND ADENOIDECTOMY      HX WISDOM TEETH EXTRACTION           Family History:   Problem Relation Age of Onset    Anxiety Mother     Hypertension Mother     COPD Mother     OSTEOARTHRITIS Mother Psychiatric Disorder Mother     Hypertension Father     Cancer Brother     Schizophrenia Maternal Aunt     Heart Disease Maternal Grandfather     Stroke Maternal Grandfather     Cancer Paternal Grandmother     Anesth Problems Neg Hx        Social History     Socioeconomic History    Marital status:      Spouse name: Not on file    Number of children: Not on file    Years of education: Not on file    Highest education level: Not on file   Occupational History    Not on file   Tobacco Use    Smoking status: Former     Packs/day: 0.25     Years: 19.00     Pack years: 4.75     Types: Cigarettes     Quit date: 05/2019     Years since quitting: 3.5    Smokeless tobacco: Current    Tobacco comments:     smokes vapor cig    Vaping Use    Vaping Use: Every day    Substances: Nicotine (lowest dose)    Devices: Pre-filled pod   Substance and Sexual Activity    Alcohol use: Yes     Alcohol/week: 0.0 standard drinks     Comment: Socially. Drug use: No    Sexual activity: Yes     Partners: Male     Birth control/protection: Surgical   Other Topics Concern    Not on file   Social History Narrative    Not on file     Social Determinants of Health     Financial Resource Strain: Low Risk     Difficulty of Paying Living Expenses: Not hard at all   Food Insecurity: No Food Insecurity    Worried About Running Out of Food in the Last Year: Never true    Ran Out of Food in the Last Year: Never true   Transportation Needs: Not on file   Physical Activity: Not on file   Stress: Not on file   Social Connections: Not on file   Intimate Partner Violence: Not on file   Housing Stability: Not on file         ALLERGIES: Other food, Codeine, Tylenol-codeine #2, and Nuts [tree nut]    Review of Systems   Constitutional:  Negative for activity change, chills, diaphoresis, fever and malaise/fatigue. HENT:  Negative for nosebleeds, sore throat, trouble swallowing and voice change. Eyes:  Negative for visual disturbance.    Respiratory: Negative for cough, hemoptysis, sputum production and shortness of breath. Cardiovascular:  Positive for chest pain. Negative for palpitations, orthopnea, claudication, PND and near-syncope. Gastrointestinal:  Negative for abdominal pain, constipation, diarrhea, nausea and vomiting. Genitourinary:  Negative for difficulty urinating, dysuria, hematuria and urgency. Musculoskeletal:  Negative for back pain, neck pain and neck stiffness. Skin:  Negative for color change. Allergic/Immunologic: Negative for immunocompromised state. Neurological:  Negative for dizziness, seizures, syncope, weakness, light-headedness, numbness and headaches. Psychiatric/Behavioral:  Negative for behavioral problems, confusion, hallucinations, self-injury and suicidal ideas. Vitals:    11/02/22 1820 11/02/22 1822   BP: 119/79    Pulse: 77    Resp: 16    Temp: 97.7 °F (36.5 °C)    SpO2: 98% 98%   Weight: 95.3 kg (210 lb)    Height: 5' 5\" (1.651 m)             Physical Exam  Vitals and nursing note reviewed. Constitutional:       General: She is not in acute distress. Appearance: She is well-developed. She is not diaphoretic. HENT:      Head: Atraumatic. Neck:      Trachea: No tracheal deviation. Cardiovascular:      Rate and Rhythm: Normal rate and regular rhythm. Comments: Warm and well perfused  Pulmonary:      Effort: Pulmonary effort is normal. No respiratory distress. Breath sounds: Normal breath sounds. Chest:       Musculoskeletal:         General: Normal range of motion. Skin:     General: Skin is warm and dry. Neurological:      Mental Status: She is alert. Coordination: Coordination normal.   Psychiatric:         Behavior: Behavior normal.         Thought Content:  Thought content normal.         Judgment: Judgment normal.        MDM    This is a 55-year-old female with past medical history, review of systems, physical exam as above, presenting with complaints of left-sided chest wall pain. Patient states approximately 4 days worth of symptoms, worse with deep breathing, turning, or specific movements. She denies cough, fevers, shortness of breath. She states she does not know what caused the pain, however states it feels like \"I pulled something\". Patient states she has been using Tylenol, with little relief. She denies skin changes, ecchymosis, falls or trauma. Physical exam is remarkable for well-appearing middle-aged female, in no acute distress noted to be normotensive, afebrile without tachycardia, satting well on room air. She endorses the use of vaping products, rare alcohol use. She has tenderness to palpation over the mid axillary line, lower ribs, without instability, clear breath sounds to auscultation, regular rate and rhythm without murmurs gallops or rubs. Discussed with patient differential includes costochondritis, pleural effusion, pneumonia, though less likely without reports of cough or fever. Plan to provide anti-inflammatories, obtain CMP, CBC, chest x-ray, cardiac enzymes, D-dimer and lipase. UA to rule out renal process. We will reassess, and make a disposition.     Procedures

## 2022-11-02 NOTE — ED NOTES
Verbal shift change report given to Jase Carney RN (oncoming nurse) by Radames Bonilla RN (offgoing nurse). Report included the following information SBAR, ED Summary, MAR, and Recent Results.

## 2022-11-03 LAB
ATRIAL RATE: 71 BPM
CALCULATED P AXIS, ECG09: 39 DEGREES
CALCULATED R AXIS, ECG10: 28 DEGREES
CALCULATED T AXIS, ECG11: 12 DEGREES
DIAGNOSIS, 93000: NORMAL
P-R INTERVAL, ECG05: 136 MS
Q-T INTERVAL, ECG07: 418 MS
QRS DURATION, ECG06: 86 MS
QTC CALCULATION (BEZET), ECG08: 454 MS
VENTRICULAR RATE, ECG03: 71 BPM

## 2022-11-09 ENCOUNTER — OFFICE VISIT (OUTPATIENT)
Dept: SURGERY | Age: 42
End: 2022-11-09
Payer: MEDICAID

## 2022-11-09 VITALS
HEIGHT: 65 IN | OXYGEN SATURATION: 98 % | BODY MASS INDEX: 36.49 KG/M2 | HEART RATE: 77 BPM | WEIGHT: 219 LBS | DIASTOLIC BLOOD PRESSURE: 81 MMHG | SYSTOLIC BLOOD PRESSURE: 122 MMHG | TEMPERATURE: 98.6 F

## 2022-11-09 DIAGNOSIS — K42.9 PERIUMBILICAL HERNIA: Primary | ICD-10-CM

## 2022-11-09 PROCEDURE — 99204 OFFICE O/P NEW MOD 45 MIN: CPT | Performed by: COLON & RECTAL SURGERY

## 2022-11-09 NOTE — LETTER
11/9/2022    Patient: aTti Plunkett   YOB: 1980   Date of Visit: 11/9/2022     Angelina Weeks NP  [address] [address]  [place]    Dear Angelina Weeks NP,      Thank you for referring Ms. Ozzie Sams to [address] [place] [address] for evaluation. My notes for this consultation are attached. If you have questions, please do not hesitate to call me. I look forward to following your patient along with you.       Sincerely,    Otoniel Hernandez MD

## 2022-11-09 NOTE — PROGRESS NOTES
OFFICE VISIT NOTE    Amador Goodman is a 43 y.o. female who presents to the office today for:    Chief Complaint   Patient presents with    New Patient     Referred by Cari Donato NP - umbilical hernia       The patient is a 78-year-old female who is referred today for evaluation of a periumbilical hernia. She states that she been aware of it for a while and that it is more comfortable after she has eaten. She denies any obstructive symptoms associated with it. She states is above the umbilicus. Her past history seen for history of anxiety disorder, ADHD, PTSD, hyperlipidemia, GERD, chronic back pain.   Surgical history seen for history of hysterectomy, cholecystectomy, breast reduction, tonsillectomy and adenoidectomy      Past Medical History:   Diagnosis Date    ADHD (attention deficit hyperactivity disorder)     Anxiety     Asthma     CHILDHOOD    Chronic back pain     Chronic pain     DISC ISSUES IN BACK    Dental disorder     Dyspepsia and other specified disorders of function of stomach     GERD (gastroesophageal reflux disease)     PTSD (post-traumatic stress disorder)        Past Surgical History:   Procedure Laterality Date    HX BREAST REDUCTION  2002    HX CHOLECYSTECTOMY      HX CYST REMOVAL Right     ganglion cyst removed from right wrist    HX GYN  2012    BTL    HX GYN      D&C X 2    HX GYN  2015    Hysterectomy    HX HEENT      HX HYSTERECTOMY      HX OTHER SURGICAL Right 05/2013    Ganglion cyst removal from wrist    HX OTHER SURGICAL      rectal surgery    HX TONSIL AND ADENOIDECTOMY      HX WISDOM TEETH EXTRACTION         Family History   Problem Relation Age of Onset    Anxiety Mother     Hypertension Mother     COPD Mother     OSTEOARTHRITIS Mother     Psychiatric Disorder Mother     Hypertension Father     Cancer Brother     Schizophrenia Maternal Aunt     Heart Disease Maternal Grandfather     Stroke Maternal Grandfather     Cancer Paternal Grandmother     Anesth Problems Neg Hx        Social History     Socioeconomic History    Marital status:      Spouse name: Not on file    Number of children: Not on file    Years of education: Not on file    Highest education level: Not on file   Occupational History    Not on file   Tobacco Use    Smoking status: Former     Packs/day: 0.25     Years: 19.00     Pack years: 4.75     Types: Cigarettes     Quit date: 5/1/2019     Years since quitting: 3.5    Smokeless tobacco: Current    Tobacco comments:     smokes vapor cig    Vaping Use    Vaping Use: Every day    Substances: Nicotine (lowest dose)    Devices: Pre-filled pod   Substance and Sexual Activity    Alcohol use: Not Currently     Comment: Socially. Drug use: No    Sexual activity: Yes     Partners: Male     Birth control/protection: Surgical   Other Topics Concern    Not on file   Social History Narrative    Not on file     Social Determinants of Health     Financial Resource Strain: Low Risk     Difficulty of Paying Living Expenses: Not hard at all   Food Insecurity: No Food Insecurity    Worried About Running Out of Food in the Last Year: Never true    Ran Out of Food in the Last Year: Never true   Transportation Needs: Not on file   Physical Activity: Not on file   Stress: Not on file   Social Connections: Not on file   Intimate Partner Violence: Not on file   Housing Stability: Not on file       Allergies   Allergen Reactions    Other Food Itching     Fresh fruit, cannot be specific    Codeine Hives     Allergic to tylenol with codeine combo    Tylenol-Codeine #2 Hives    Nuts [Tree Nut] Itching       Current Outpatient Medications   Medication Sig    Vyvanse 70 mg cap TK 1 C PO every day    hydrOXYzine HCL (ATARAX) 50 mg tablet Take 1 Tablet by mouth three (3) times daily as needed for Anxiety.     albuterol (PROVENTIL HFA, VENTOLIN HFA, PROAIR HFA) 90 mcg/actuation inhaler inhale 2 puffs by mouth and INTO THE LUNGS every 4 hours if needed for wheezing    citalopram (CELEXA) 40 mg tablet Take 1 Tablet by mouth in the morning. loratadine (Claritin) 10 mg tablet Take 1 Tablet by mouth daily. lansoprazole (PREVACID) 15 mg capsule Take 15 mg by mouth Daily (before breakfast). ibuprofen (MOTRIN) 800 mg tablet TAKE 1 TABLET BY MOUTH EVERY 6 HOURS AS NEEDED FOR PAIN. TAKE WITH FOOD.    acetaminophen (TYLENOL) 500 mg tablet Take 500 mg by mouth every six (6) hours as needed for Pain. OTC    linaCLOtide (LINZESS) 145 mcg cap capsule Take 1 Capsule by mouth Daily (before breakfast). (Patient not taking: Reported on 11/9/2022)     No current facility-administered medications for this visit. Review of Systems   Constitutional: Negative. HENT: Negative. Eyes: Negative. Respiratory: Negative. Cardiovascular: Negative. Gastrointestinal: Negative. Genitourinary: Negative. Musculoskeletal:  Positive for back pain. Skin: Negative. Neurological: Negative. Endo/Heme/Allergies: Negative. Psychiatric/Behavioral: Negative. /81 (BP 1 Location: Left upper arm, BP Patient Position: Sitting, BP Cuff Size: Adult)   Pulse 77   Temp 98.6 °F (37 °C) (Temporal)   Ht 5' 5\" (1.651 m)   Wt 99.3 kg (219 lb)   LMP 05/19/2015 (Approximate)   SpO2 98%   BMI 36.44 kg/m²     Physical Exam  Constitutional:       General: She is not in acute distress. Appearance: Normal appearance. She is obese. She is not ill-appearing. HENT:      Head: Normocephalic. Cardiovascular:      Rate and Rhythm: Normal rate and regular rhythm. Pulmonary:      Effort: Pulmonary effort is normal.   Abdominal:      General: There is no distension. Palpations: Abdomen is soft. Tenderness: There is no abdominal tenderness. Hernia: A hernia (Supraumbilical hernia nonreducible) is present.    Musculoskeletal:         General: Normal range of motion. Cervical back: Normal range of motion and neck supple. Skin:     General: Skin is warm and dry. Neurological:      General: No focal deficit present. Mental Status: She is alert and oriented to person, place, and time. Psychiatric:         Mood and Affect: Mood normal.         Thought Content: Thought content normal.         Judgment: Judgment normal.       Problem List Items Addressed This Visit          Other    Periumbilical hernia - Primary       Assessment and Plan:  I told the patient given the fact  Not symptomatic and certainly reasonable to proceed with repair. I reviewed the procedure with her including the placement of mesh. The hernia defect was measured by ultrasound to be 9 mm. I told her given the fact the defect is approximately 1 cm it will require mesh. I reviewed the risks and benefits of the surgery. I reviewed the risk of infection, bleeding, wound complications, recurrence, injury to intra-abdominal organs and structures. She wishes to proceed and her surgeon is scheduled for November 18, 2022.       Roberta Rand MD

## 2022-11-09 NOTE — PROGRESS NOTES
Identified pt with two pt identifiers (name and ). Reviewed chart in preparation for visit and have obtained necessary documentation.     Ange Webb is a 43 y.o. female  Chief Complaint   Patient presents with    New Patient     Referred by Ayo Graff NP - umbilical hernia     Visit Vitals  /81 (BP 1 Location: Left upper arm, BP Patient Position: Sitting, BP Cuff Size: Adult)   Pulse 77   Temp 98.6 °F (37 °C) (Temporal)   Ht 5' 5\" (1.651 m)   Wt 219 lb (99.3 kg)   LMP 2015 (Approximate)   SpO2 98%   BMI 36.44 kg/m²

## 2022-11-09 NOTE — H&P (VIEW-ONLY)
OFFICE VISIT NOTE    Shaheen Gross is a 43 y.o. female who presents to the office today for:    Chief Complaint   Patient presents with    New Patient     Referred by Jenna Agee NP - umbilical hernia       The patient is a 40-year-old female who is referred today for evaluation of a periumbilical hernia. She states that she been aware of it for a while and that it is more comfortable after she has eaten. She denies any obstructive symptoms associated with it. She states is above the umbilicus. Her past history seen for history of anxiety disorder, ADHD, PTSD, hyperlipidemia, GERD, chronic back pain.   Surgical history seen for history of hysterectomy, cholecystectomy, breast reduction, tonsillectomy and adenoidectomy      Past Medical History:   Diagnosis Date    ADHD (attention deficit hyperactivity disorder)     Anxiety     Asthma     CHILDHOOD    Chronic back pain     Chronic pain     DISC ISSUES IN BACK    Dental disorder     Dyspepsia and other specified disorders of function of stomach     GERD (gastroesophageal reflux disease)     PTSD (post-traumatic stress disorder)        Past Surgical History:   Procedure Laterality Date    HX BREAST REDUCTION  2002    HX CHOLECYSTECTOMY      HX CYST REMOVAL Right     ganglion cyst removed from right wrist    HX GYN  2012    BTL    HX GYN      D&C X 2    HX GYN  2015    Hysterectomy    HX HEENT      HX HYSTERECTOMY      HX OTHER SURGICAL Right 05/2013    Ganglion cyst removal from wrist    HX OTHER SURGICAL      rectal surgery    HX TONSIL AND ADENOIDECTOMY      HX WISDOM TEETH EXTRACTION         Family History   Problem Relation Age of Onset    Anxiety Mother     Hypertension Mother     COPD Mother     OSTEOARTHRITIS Mother     Psychiatric Disorder Mother     Hypertension Father     Cancer Brother     Schizophrenia Maternal Aunt     Heart Disease Maternal Grandfather     Stroke Maternal Grandfather     Cancer Paternal Grandmother     Anesth Problems Neg Hx        Social History     Socioeconomic History    Marital status:      Spouse name: Not on file    Number of children: Not on file    Years of education: Not on file    Highest education level: Not on file   Occupational History    Not on file   Tobacco Use    Smoking status: Former     Packs/day: 0.25     Years: 19.00     Pack years: 4.75     Types: Cigarettes     Quit date: 5/1/2019     Years since quitting: 3.5    Smokeless tobacco: Current    Tobacco comments:     smokes vapor cig    Vaping Use    Vaping Use: Every day    Substances: Nicotine (lowest dose)    Devices: Pre-filled pod   Substance and Sexual Activity    Alcohol use: Not Currently     Comment: Socially. Drug use: No    Sexual activity: Yes     Partners: Male     Birth control/protection: Surgical   Other Topics Concern    Not on file   Social History Narrative    Not on file     Social Determinants of Health     Financial Resource Strain: Low Risk     Difficulty of Paying Living Expenses: Not hard at all   Food Insecurity: No Food Insecurity    Worried About Running Out of Food in the Last Year: Never true    Ran Out of Food in the Last Year: Never true   Transportation Needs: Not on file   Physical Activity: Not on file   Stress: Not on file   Social Connections: Not on file   Intimate Partner Violence: Not on file   Housing Stability: Not on file       Allergies   Allergen Reactions    Other Food Itching     Fresh fruit, cannot be specific    Codeine Hives     Allergic to tylenol with codeine combo    Tylenol-Codeine #2 Hives    Nuts [Tree Nut] Itching       Current Outpatient Medications   Medication Sig    Vyvanse 70 mg cap TK 1 C PO every day    hydrOXYzine HCL (ATARAX) 50 mg tablet Take 1 Tablet by mouth three (3) times daily as needed for Anxiety.     albuterol (PROVENTIL HFA, VENTOLIN HFA, PROAIR HFA) 90 mcg/actuation inhaler inhale 2 puffs by mouth and INTO THE LUNGS every 4 hours if needed for wheezing    citalopram (CELEXA) 40 mg tablet Take 1 Tablet by mouth in the morning. loratadine (Claritin) 10 mg tablet Take 1 Tablet by mouth daily. lansoprazole (PREVACID) 15 mg capsule Take 15 mg by mouth Daily (before breakfast). ibuprofen (MOTRIN) 800 mg tablet TAKE 1 TABLET BY MOUTH EVERY 6 HOURS AS NEEDED FOR PAIN. TAKE WITH FOOD.    acetaminophen (TYLENOL) 500 mg tablet Take 500 mg by mouth every six (6) hours as needed for Pain. OTC    linaCLOtide (LINZESS) 145 mcg cap capsule Take 1 Capsule by mouth Daily (before breakfast). (Patient not taking: Reported on 11/9/2022)     No current facility-administered medications for this visit. Review of Systems   Constitutional: Negative. HENT: Negative. Eyes: Negative. Respiratory: Negative. Cardiovascular: Negative. Gastrointestinal: Negative. Genitourinary: Negative. Musculoskeletal:  Positive for back pain. Skin: Negative. Neurological: Negative. Endo/Heme/Allergies: Negative. Psychiatric/Behavioral: Negative. /81 (BP 1 Location: Left upper arm, BP Patient Position: Sitting, BP Cuff Size: Adult)   Pulse 77   Temp 98.6 °F (37 °C) (Temporal)   Ht 5' 5\" (1.651 m)   Wt 99.3 kg (219 lb)   LMP 05/19/2015 (Approximate)   SpO2 98%   BMI 36.44 kg/m²     Physical Exam  Constitutional:       General: She is not in acute distress. Appearance: Normal appearance. She is obese. She is not ill-appearing. HENT:      Head: Normocephalic. Cardiovascular:      Rate and Rhythm: Normal rate and regular rhythm. Pulmonary:      Effort: Pulmonary effort is normal.   Abdominal:      General: There is no distension. Palpations: Abdomen is soft. Tenderness: There is no abdominal tenderness. Hernia: A hernia (Supraumbilical hernia nonreducible) is present.    Musculoskeletal:         General: Normal range of motion. Cervical back: Normal range of motion and neck supple. Skin:     General: Skin is warm and dry. Neurological:      General: No focal deficit present. Mental Status: She is alert and oriented to person, place, and time. Psychiatric:         Mood and Affect: Mood normal.         Thought Content: Thought content normal.         Judgment: Judgment normal.       Problem List Items Addressed This Visit          Other    Periumbilical hernia - Primary       Assessment and Plan:  I told the patient given the fact  Not symptomatic and certainly reasonable to proceed with repair. I reviewed the procedure with her including the placement of mesh. The hernia defect was measured by ultrasound to be 9 mm. I told her given the fact the defect is approximately 1 cm it will require mesh. I reviewed the risks and benefits of the surgery. I reviewed the risk of infection, bleeding, wound complications, recurrence, injury to intra-abdominal organs and structures. She wishes to proceed and her surgeon is scheduled for November 18, 2022.       Kathy Roca MD

## 2022-11-13 DIAGNOSIS — F90.2 ADHD (ATTENTION DEFICIT HYPERACTIVITY DISORDER), COMBINED TYPE: ICD-10-CM

## 2022-11-14 ENCOUNTER — PATIENT MESSAGE (OUTPATIENT)
Dept: FAMILY MEDICINE CLINIC | Age: 42
End: 2022-11-14

## 2022-11-14 DIAGNOSIS — F90.2 ADHD (ATTENTION DEFICIT HYPERACTIVITY DISORDER), COMBINED TYPE: ICD-10-CM

## 2022-11-14 RX ORDER — LISDEXAMFETAMINE DIMESYLATE 70 MG/1
CAPSULE ORAL
Qty: 30 CAPSULE | Refills: 0 | Status: SHIPPED | OUTPATIENT
Start: 2022-11-15 | End: 2022-11-15 | Stop reason: SDUPTHER

## 2022-11-14 NOTE — TELEPHONE ENCOUNTER
From: Tati Plunkett  To: Angelina Weeks NP  Sent: 11/14/2022 1:07 PM EST  Subject: Vyvanse    I wan wondering if you could send the Vyvanse prescription to [facility] in [place] instead at [phone] [facility], [facility] [address]. The one in [facility] is out of it.   Thank you so much

## 2022-11-15 ENCOUNTER — TELEPHONE (OUTPATIENT)
Dept: FAMILY MEDICINE CLINIC | Age: 42
End: 2022-11-15

## 2022-11-15 NOTE — TELEPHONE ENCOUNTER
Pt is needing her vyvanse to be sent to be sent to the [facility] in [place] .

## 2022-11-16 RX ORDER — LISDEXAMFETAMINE DIMESYLATE 70 MG/1
CAPSULE ORAL
Qty: 30 CAPSULE | Refills: 0 | Status: SHIPPED | OUTPATIENT
Start: 2022-11-16

## 2022-11-18 ENCOUNTER — HOSPITAL ENCOUNTER (OUTPATIENT)
Age: 42
Discharge: HOME | End: 2022-11-18
Attending: COLON & RECTAL SURGERY | Admitting: COLON & RECTAL SURGERY
Payer: MEDICAID

## 2022-11-18 ENCOUNTER — ANESTHESIA (OUTPATIENT)
Dept: SURGERY | Age: 42
End: 2022-11-18
Payer: MEDICAID

## 2022-11-18 ENCOUNTER — ANESTHESIA EVENT (OUTPATIENT)
Dept: SURGERY | Age: 42
End: 2022-11-18
Payer: MEDICAID

## 2022-11-18 VITALS
DIASTOLIC BLOOD PRESSURE: 61 MMHG | HEART RATE: 71 BPM | SYSTOLIC BLOOD PRESSURE: 108 MMHG | RESPIRATION RATE: 16 BRPM | OXYGEN SATURATION: 98 % | BODY MASS INDEX: 36.49 KG/M2 | WEIGHT: 219 LBS | TEMPERATURE: 98 F | HEIGHT: 65 IN

## 2022-11-18 DIAGNOSIS — K42.9 UMBILICAL HERNIA WITHOUT OBSTRUCTION AND WITHOUT GANGRENE: Primary | ICD-10-CM

## 2022-11-18 LAB
GLUCOSE BLD STRIP.AUTO-MCNC: 113 MG/DL (ref 65–100)
PERFORMED BY, TECHID: ABNORMAL

## 2022-11-18 PROCEDURE — C1781 MESH (IMPLANTABLE): HCPCS | Performed by: COLON & RECTAL SURGERY

## 2022-11-18 PROCEDURE — 76210000021 HC REC RM PH II 0.5 TO 1 HR: Performed by: COLON & RECTAL SURGERY

## 2022-11-18 PROCEDURE — 74011250637 HC RX REV CODE- 250/637: Performed by: COLON & RECTAL SURGERY

## 2022-11-18 PROCEDURE — 76210000016 HC OR PH I REC 1 TO 1.5 HR: Performed by: COLON & RECTAL SURGERY

## 2022-11-18 PROCEDURE — 2709999900 HC NON-CHARGEABLE SUPPLY: Performed by: COLON & RECTAL SURGERY

## 2022-11-18 PROCEDURE — 77030010507 HC ADH SKN DERMBND J&J -B: Performed by: COLON & RECTAL SURGERY

## 2022-11-18 PROCEDURE — 76010000149 HC OR TIME 1 TO 1.5 HR: Performed by: COLON & RECTAL SURGERY

## 2022-11-18 PROCEDURE — 74011000250 HC RX REV CODE- 250: Performed by: NURSE ANESTHETIST, CERTIFIED REGISTERED

## 2022-11-18 PROCEDURE — 74011250636 HC RX REV CODE- 250/636: Performed by: NURSE ANESTHETIST, CERTIFIED REGISTERED

## 2022-11-18 PROCEDURE — 77030031139 HC SUT VCRL2 J&J -A: Performed by: COLON & RECTAL SURGERY

## 2022-11-18 PROCEDURE — 82962 GLUCOSE BLOOD TEST: CPT

## 2022-11-18 PROCEDURE — 74011000250 HC RX REV CODE- 250: Performed by: COLON & RECTAL SURGERY

## 2022-11-18 PROCEDURE — 74011000250 HC RX REV CODE- 250: Performed by: REGISTERED NURSE

## 2022-11-18 PROCEDURE — 74011250636 HC RX REV CODE- 250/636: Performed by: REGISTERED NURSE

## 2022-11-18 PROCEDURE — 76060000033 HC ANESTHESIA 1 TO 1.5 HR: Performed by: COLON & RECTAL SURGERY

## 2022-11-18 PROCEDURE — 74011250636 HC RX REV CODE- 250/636: Performed by: COLON & RECTAL SURGERY

## 2022-11-18 PROCEDURE — 49585 PR REPAIR UMBILICAL HERN,5+Y/O,REDUC: CPT | Performed by: COLON & RECTAL SURGERY

## 2022-11-18 PROCEDURE — 74011250636 HC RX REV CODE- 250/636: Performed by: ANESTHESIOLOGY

## 2022-11-18 PROCEDURE — 77030040361 HC SLV COMPR DVT MDII -B: Performed by: COLON & RECTAL SURGERY

## 2022-11-18 PROCEDURE — 77030002933 HC SUT MCRYL J&J -A: Performed by: COLON & RECTAL SURGERY

## 2022-11-18 DEVICE — MESH HERN M DIA6.4CM VENTRAL POLYPR EPTFE CIR SELF EXP PTCH: Type: IMPLANTABLE DEVICE | Status: FUNCTIONAL

## 2022-11-18 RX ORDER — OXYCODONE AND ACETAMINOPHEN 5; 325 MG/1; MG/1
2 TABLET ORAL
Status: DISCONTINUED | OUTPATIENT
Start: 2022-11-18 | End: 2022-11-18 | Stop reason: HOSPADM

## 2022-11-18 RX ORDER — SODIUM CHLORIDE 0.9 % (FLUSH) 0.9 %
5-40 SYRINGE (ML) INJECTION AS NEEDED
Status: DISCONTINUED | OUTPATIENT
Start: 2022-11-18 | End: 2022-11-18 | Stop reason: HOSPADM

## 2022-11-18 RX ORDER — GLYCOPYRROLATE 0.2 MG/ML
INJECTION INTRAMUSCULAR; INTRAVENOUS AS NEEDED
Status: DISCONTINUED | OUTPATIENT
Start: 2022-11-18 | End: 2022-11-18 | Stop reason: HOSPADM

## 2022-11-18 RX ORDER — ONDANSETRON 2 MG/ML
4 INJECTION INTRAMUSCULAR; INTRAVENOUS AS NEEDED
Status: DISCONTINUED | OUTPATIENT
Start: 2022-11-18 | End: 2022-11-18 | Stop reason: HOSPADM

## 2022-11-18 RX ORDER — CEFAZOLIN SODIUM 1 G/3ML
INJECTION, POWDER, FOR SOLUTION INTRAMUSCULAR; INTRAVENOUS
Status: DISCONTINUED
Start: 2022-11-18 | End: 2022-11-18 | Stop reason: HOSPADM

## 2022-11-18 RX ORDER — OXYCODONE AND ACETAMINOPHEN 5; 325 MG/1; MG/1
1 TABLET ORAL
Qty: 20 TABLET | Refills: 0 | Status: SHIPPED | OUTPATIENT
Start: 2022-11-18 | End: 2022-11-23

## 2022-11-18 RX ORDER — SODIUM CHLORIDE, SODIUM LACTATE, POTASSIUM CHLORIDE, CALCIUM CHLORIDE 600; 310; 30; 20 MG/100ML; MG/100ML; MG/100ML; MG/100ML
20 INJECTION, SOLUTION INTRAVENOUS CONTINUOUS
Status: DISCONTINUED | OUTPATIENT
Start: 2022-11-18 | End: 2022-11-18 | Stop reason: HOSPADM

## 2022-11-18 RX ORDER — FENTANYL CITRATE 50 UG/ML
INJECTION, SOLUTION INTRAMUSCULAR; INTRAVENOUS AS NEEDED
Status: DISCONTINUED | OUTPATIENT
Start: 2022-11-18 | End: 2022-11-18 | Stop reason: HOSPADM

## 2022-11-18 RX ORDER — DEXMEDETOMIDINE HYDROCHLORIDE 100 UG/ML
INJECTION, SOLUTION INTRAVENOUS AS NEEDED
Status: DISCONTINUED | OUTPATIENT
Start: 2022-11-18 | End: 2022-11-18 | Stop reason: HOSPADM

## 2022-11-18 RX ORDER — BUPIVACAINE HYDROCHLORIDE AND EPINEPHRINE 5; 5 MG/ML; UG/ML
INJECTION, SOLUTION EPIDURAL; INTRACAUDAL; PERINEURAL AS NEEDED
Status: DISCONTINUED | OUTPATIENT
Start: 2022-11-18 | End: 2022-11-18 | Stop reason: HOSPADM

## 2022-11-18 RX ORDER — SODIUM CHLORIDE 0.9 % (FLUSH) 0.9 %
5-40 SYRINGE (ML) INJECTION EVERY 8 HOURS
Status: DISCONTINUED | OUTPATIENT
Start: 2022-11-18 | End: 2022-11-18 | Stop reason: HOSPADM

## 2022-11-18 RX ORDER — FENTANYL CITRATE 50 UG/ML
25 INJECTION, SOLUTION INTRAMUSCULAR; INTRAVENOUS
Status: COMPLETED | OUTPATIENT
Start: 2022-11-18 | End: 2022-11-18

## 2022-11-18 RX ORDER — MIDAZOLAM HYDROCHLORIDE 1 MG/ML
INJECTION, SOLUTION INTRAMUSCULAR; INTRAVENOUS AS NEEDED
Status: DISCONTINUED | OUTPATIENT
Start: 2022-11-18 | End: 2022-11-18 | Stop reason: HOSPADM

## 2022-11-18 RX ORDER — LIDOCAINE HYDROCHLORIDE 20 MG/ML
INJECTION, SOLUTION EPIDURAL; INFILTRATION; INTRACAUDAL; PERINEURAL AS NEEDED
Status: DISCONTINUED | OUTPATIENT
Start: 2022-11-18 | End: 2022-11-18 | Stop reason: HOSPADM

## 2022-11-18 RX ORDER — ONDANSETRON 2 MG/ML
INJECTION INTRAMUSCULAR; INTRAVENOUS AS NEEDED
Status: DISCONTINUED | OUTPATIENT
Start: 2022-11-18 | End: 2022-11-18 | Stop reason: HOSPADM

## 2022-11-18 RX ORDER — HYDROMORPHONE HYDROCHLORIDE 1 MG/ML
0.5 INJECTION, SOLUTION INTRAMUSCULAR; INTRAVENOUS; SUBCUTANEOUS
Status: DISCONTINUED | OUTPATIENT
Start: 2022-11-18 | End: 2022-11-18 | Stop reason: HOSPADM

## 2022-11-18 RX ORDER — SODIUM CHLORIDE, SODIUM LACTATE, POTASSIUM CHLORIDE, CALCIUM CHLORIDE 600; 310; 30; 20 MG/100ML; MG/100ML; MG/100ML; MG/100ML
INJECTION, SOLUTION INTRAVENOUS
Status: DISCONTINUED | OUTPATIENT
Start: 2022-11-18 | End: 2022-11-18 | Stop reason: HOSPADM

## 2022-11-18 RX ORDER — SUCCINYLCHOLINE CHLORIDE 20 MG/ML
INJECTION INTRAMUSCULAR; INTRAVENOUS AS NEEDED
Status: DISCONTINUED | OUTPATIENT
Start: 2022-11-18 | End: 2022-11-18 | Stop reason: HOSPADM

## 2022-11-18 RX ORDER — ROCURONIUM BROMIDE 10 MG/ML
INJECTION, SOLUTION INTRAVENOUS AS NEEDED
Status: DISCONTINUED | OUTPATIENT
Start: 2022-11-18 | End: 2022-11-18 | Stop reason: HOSPADM

## 2022-11-18 RX ORDER — NEOSTIGMINE METHYLSULFATE 5 MG/5 ML
SYRINGE (ML) INTRAVENOUS AS NEEDED
Status: DISCONTINUED | OUTPATIENT
Start: 2022-11-18 | End: 2022-11-18 | Stop reason: HOSPADM

## 2022-11-18 RX ORDER — DEXAMETHASONE SODIUM PHOSPHATE 4 MG/ML
INJECTION, SOLUTION INTRA-ARTICULAR; INTRALESIONAL; INTRAMUSCULAR; INTRAVENOUS; SOFT TISSUE AS NEEDED
Status: DISCONTINUED | OUTPATIENT
Start: 2022-11-18 | End: 2022-11-18 | Stop reason: HOSPADM

## 2022-11-18 RX ORDER — PROPOFOL 10 MG/ML
INJECTION, EMULSION INTRAVENOUS AS NEEDED
Status: DISCONTINUED | OUTPATIENT
Start: 2022-11-18 | End: 2022-11-18 | Stop reason: HOSPADM

## 2022-11-18 RX ADMIN — ROCURONIUM BROMIDE 30 MG: 10 INJECTION, SOLUTION INTRAVENOUS at 07:40

## 2022-11-18 RX ADMIN — SODIUM CHLORIDE, POTASSIUM CHLORIDE, SODIUM LACTATE AND CALCIUM CHLORIDE: 600; 310; 30; 20 INJECTION, SOLUTION INTRAVENOUS at 07:29

## 2022-11-18 RX ADMIN — SUCCINYLCHOLINE CHLORIDE 140 MG: 20 INJECTION, SOLUTION INTRAMUSCULAR; INTRAVENOUS at 07:35

## 2022-11-18 RX ADMIN — DEXMEDETOMIDINE HYDROCHLORIDE 10 MCG: 100 INJECTION, SOLUTION INTRAVENOUS at 07:49

## 2022-11-18 RX ADMIN — ONDANSETRON 4 MG: 2 INJECTION INTRAMUSCULAR; INTRAVENOUS at 08:51

## 2022-11-18 RX ADMIN — OXYCODONE HYDROCHLORIDE AND ACETAMINOPHEN 2 TABLET: 5; 325 TABLET ORAL at 10:08

## 2022-11-18 RX ADMIN — FENTANYL CITRATE 100 MCG: 50 INJECTION, SOLUTION INTRAMUSCULAR; INTRAVENOUS at 07:35

## 2022-11-18 RX ADMIN — DEXAMETHASONE SODIUM PHOSPHATE 4 MG: 4 INJECTION, SOLUTION INTRA-ARTICULAR; INTRALESIONAL; INTRAMUSCULAR; INTRAVENOUS; SOFT TISSUE at 07:44

## 2022-11-18 RX ADMIN — ONDANSETRON 4 MG: 2 INJECTION INTRAMUSCULAR; INTRAVENOUS at 07:44

## 2022-11-18 RX ADMIN — GLYCOPYRROLATE 0.4 MG: 0.2 INJECTION INTRAMUSCULAR; INTRAVENOUS at 08:24

## 2022-11-18 RX ADMIN — DEXMEDETOMIDINE HYDROCHLORIDE 10 MCG: 100 INJECTION, SOLUTION INTRAVENOUS at 07:58

## 2022-11-18 RX ADMIN — MIDAZOLAM HYDROCHLORIDE 2 MG: 2 INJECTION, SOLUTION INTRAMUSCULAR; INTRAVENOUS at 07:28

## 2022-11-18 RX ADMIN — LIDOCAINE HYDROCHLORIDE 100 MG: 20 INJECTION, SOLUTION EPIDURAL; INFILTRATION; INTRACAUDAL; PERINEURAL at 07:35

## 2022-11-18 RX ADMIN — HYDROMORPHONE HYDROCHLORIDE 0.5 MG: 1 INJECTION, SOLUTION INTRAMUSCULAR; INTRAVENOUS; SUBCUTANEOUS at 09:31

## 2022-11-18 RX ADMIN — FENTANYL CITRATE 25 MCG: 0.05 INJECTION, SOLUTION INTRAMUSCULAR; INTRAVENOUS at 09:06

## 2022-11-18 RX ADMIN — FENTANYL CITRATE 25 MCG: 0.05 INJECTION, SOLUTION INTRAMUSCULAR; INTRAVENOUS at 09:18

## 2022-11-18 RX ADMIN — Medication 3 MG: at 08:24

## 2022-11-18 RX ADMIN — CEFAZOLIN SODIUM 2 G: 1 INJECTION, POWDER, FOR SOLUTION INTRAMUSCULAR; INTRAVENOUS at 07:30

## 2022-11-18 RX ADMIN — FENTANYL CITRATE 25 MCG: 0.05 INJECTION, SOLUTION INTRAMUSCULAR; INTRAVENOUS at 08:49

## 2022-11-18 RX ADMIN — PROPOFOL 200 MG: 10 INJECTION, EMULSION INTRAVENOUS at 07:35

## 2022-11-18 RX ADMIN — SODIUM CHLORIDE, POTASSIUM CHLORIDE, SODIUM LACTATE AND CALCIUM CHLORIDE 20 ML/HR: 600; 310; 30; 20 INJECTION, SOLUTION INTRAVENOUS at 06:45

## 2022-11-18 RX ADMIN — FENTANYL CITRATE 25 MCG: 0.05 INJECTION, SOLUTION INTRAMUSCULAR; INTRAVENOUS at 08:57

## 2022-11-18 NOTE — ANESTHESIA POSTPROCEDURE EVALUATION
Procedure(s):  REPAIR UMBILICAL HERNIA WITH MESH. general    Anesthesia Post Evaluation      Multimodal analgesia: multimodal analgesia used between 6 hours prior to anesthesia start to PACU discharge  Patient location during evaluation: PACU  Patient participation: complete - patient participated  Level of consciousness: awake  Pain score: 0  Pain management: adequate  Airway patency: patent  Anesthetic complications: no  Cardiovascular status: acceptable  Respiratory status: acceptable  Hydration status: acceptable  Post anesthesia nausea and vomiting:  controlled  Final Post Anesthesia Temperature Assessment:  Normothermia (36.0-37.5 degrees C)      INITIAL Post-op Vital signs:   Vitals Value Taken Time   /68 11/18/22 0910   Temp 36.6 °C (97.9 °F) 11/18/22 0851   Pulse 67 11/18/22 0915   Resp 14 11/18/22 0915   SpO2 96 % 11/18/22 0915   Vitals shown include unvalidated device data.

## 2022-11-18 NOTE — PERIOP NOTES
TRANSFER - OUT REPORT:    Verbal report given to Kang Blanco RN(name) on Amador Goodman  being transferred to [place] [address](unit) for routine post - op       Report consisted of patients Situation, Background, Assessment and   Recommendations(SBAR). Information from the following report(s) SBAR, Procedure Summary, Intake/Output, and MAR was reviewed with the receiving nurse. Opportunity for questions and clarification was provided.       Patient transported with:   Registered Nurse

## 2022-11-18 NOTE — DISCHARGE INSTRUCTIONS
No lifting greater than 10 pounds, no strenuous activity  May remove dressing in 24 hours and shower, no tub baths  Follow-up my office 3 weeks

## 2022-11-18 NOTE — ANESTHESIA PREPROCEDURE EVALUATION
Relevant Problems   NEUROLOGY   (+) ADHD (attention deficit hyperactivity disorder), combined type   (+) Adjustment disorder with mixed anxiety and depressed mood   (+) Generalized anxiety disorder   (+) PTSD (post-traumatic stress disorder)   (+) Social anxiety disorder      ENDOCRINE   (+) Obesity       Anesthetic History   No history of anesthetic complications            Review of Systems / Medical History  Patient summary reviewed and pertinent labs reviewed    Pulmonary            Asthma        Neuro/Psych   Within defined limits           Cardiovascular  Within defined limits                  Comments: Normal sinus rhythm   Normal ECG   When compared with ECG of 03-JUN-2015 13:34,   Nonspecific T wave abnormality now evident in Anterior leads    GI/Hepatic/Renal     GERD           Endo/Other        Obesity     Other Findings            Past Medical History:   Diagnosis Date    ADHD (attention deficit hyperactivity disorder)     Anxiety     Asthma     CHILDHOOD    Chronic back pain     Chronic pain     DISC ISSUES IN BACK    Dental disorder     Dyspepsia and other specified disorders of function of stomach     GERD (gastroesophageal reflux disease)     PTSD (post-traumatic stress disorder)        Past Surgical History:   Procedure Laterality Date    HX BREAST REDUCTION  2002    HX CHOLECYSTECTOMY      HX CYST REMOVAL Right     ganglion cyst removed from right wrist    HX GYN  2012    BTL    HX GYN      D&C X 2    HX GYN  2015    Hysterectomy    HX HEENT      HX HYSTERECTOMY      HX OTHER SURGICAL Right 05/2013    Ganglion cyst removal from wrist    HX OTHER SURGICAL      rectal surgery    HX TONSIL AND ADENOIDECTOMY      HX WISDOM TEETH EXTRACTION         Current Outpatient Medications   Medication Instructions    acetaminophen (TYLENOL) 500 mg, Oral, EVERY 6 HOURS AS NEEDED, OTC     albuterol (PROVENTIL HFA, VENTOLIN HFA, PROAIR HFA) 90 mcg/actuation inhaler inhale 2 puffs by mouth and INTO THE LUNGS every 4 hours if needed for wheezing    citalopram (CELEXA) 40 mg, Oral, DAILY    hydrOXYzine HCL (ATARAX) 50 mg, Oral, 3 TIMES DAILY AS NEEDED    ibuprofen (MOTRIN) 800 mg tablet TAKE 1 TABLET BY MOUTH EVERY 6 HOURS AS NEEDED FOR PAIN. TAKE WITH FOOD.     lansoprazole (PREVACID) 15 mg, Oral, DAILY BEFORE BREAKFAST    loratadine (CLARITIN) 10 mg, Oral, DAILY    Vyvanse 70 mg cap TK 1 C PO every day       Current Facility-Administered Medications   Medication Dose Route Frequency    lactated Ringers infusion  20 mL/hr IntraVENous CONTINUOUS    ceFAZolin (ANCEF) 2 g in sterile water (preservative free) 20 mL IV syringe  2 g IntraVENous ONCE    ceFAZolin (ANCEF) 1 gram injection           Patient Vitals for the past 24 hrs:   Temp Pulse Resp BP SpO2   11/18/22 0642 36.5 °C (97.7 °F) 88 18 111/73 98 %       Lab Results   Component Value Date/Time    WBC 5.4 11/02/2022 07:42 PM    HGB 12.2 11/02/2022 07:42 PM    HCT 35.8 11/02/2022 07:42 PM    PLATELET 254 99/82/0303 07:42 PM    MCV 91.1 11/02/2022 07:42 PM     Lab Results   Component Value Date/Time    Sodium 140 11/02/2022 07:42 PM    Potassium 4.1 11/02/2022 07:42 PM    Chloride 106 11/02/2022 07:42 PM    CO2 26 11/02/2022 07:42 PM    Anion gap 8 11/02/2022 07:42 PM    Glucose 101 (H) 11/02/2022 07:42 PM    BUN 13 11/02/2022 07:42 PM    Creatinine 0.71 11/02/2022 07:42 PM    BUN/Creatinine ratio 18 11/02/2022 07:42 PM    GFR est AA >60 01/15/2021 06:30 AM    GFR est non-AA >60 01/15/2021 06:30 AM    Calcium 8.6 11/02/2022 07:42 PM     No results found for: APTT, PTP, INR, INREXT  Lab Results   Component Value Date/Time    Glucose 101 (H) 11/02/2022 07:42 PM    Glucose (POC) 113 (H) 11/18/2022 06:37 AM         Physical Exam    Airway  Mallampati: II    Neck ROM: normal range of motion        Cardiovascular    Rhythm: regular  Rate: normal         Dental         Pulmonary  Breath sounds clear to auscultation               Abdominal         Other Findings            Anesthetic Plan    ASA: 2  Anesthesia type: general    Monitoring Plan: Continuous noninvasive hemodynamic monitoring      Induction: Intravenous  Anesthetic plan and risks discussed with: Patient

## 2022-11-18 NOTE — PERIOP NOTES
Post Anesthetic Care Unit Report    Situation  Patients Name: Claudio Holland  : 1980  Doctors Name: Kathy Carter History:  Asthma, GERD, Anxiety  Allergies: Codeine, Tylenol, Nuts  Code Status: Full    Background   Type of Anesthesia: General with ET tube  Medication Used[de-identified] 2 ancef, 2 versed, 4 decadron, 4 Zofran, 20 precedex, 100 fentanyl, 200 propofol, 30 rocuronium, 140 succinylcholine    Procedure Performed: Repair umbilical hernia with mesh  OR Table Position: Supine  Length of Time: 72 minutes  Estimated Blood Loss: minimal    Assessment  Lines: IV 20G left arm  Type of Dressing: Dermabond and tape  Drains and Ostomies: None  Pain control: 200 fentanyl + 0.5 dilaudid  Temperature control: warmed blanked  Nausea and Vomiting control:  Zofran  Others Interventions: ice chips  Anni Score: 10    Recommendations   Diet: Regular  Medications: none  Position: semi almonte  Discharge plan: according to KYLE Ness RN

## 2022-11-18 NOTE — INTERVAL H&P NOTE
Update History & Physical    The Patient's History and Physical of November 9, 2022 was reviewed with the patient and I examined the patient. There was no change. The surgical site was confirmed by the patient and me. Plan:  The risk, benefits, expected outcome, and alternative to the recommended procedure have been discussed with the patient. Patient understands and wants to proceed with the procedure.     Electronically signed by Werner Alfonso MD on 11/18/2022 at 6:47 AM

## 2022-11-18 NOTE — OP NOTES
Operative Note    Patient: Edwin Camacho  YOB: 1980  MRN: [id number]    Date of Procedure: 11/18/2022     Pre-Op Diagnosis: UMBILICAL HERNIA    Post-Op Diagnosis: Same as preoperative diagnosis. Procedure(s):  REPAIR UMBILICAL HERNIA WITH MESH    Surgeon(s):  Kwabena Hoyt MD    Surgical Assistant: Surg Asst-1: Katlyn Acuna    Anesthesia: General     Estimated Blood Loss (mL):  Minimal    Complications: None    Specimens: * No specimens in log *     Implants:   Implant Name Type Inv. Item Serial No.  Lot No. LRB No. Used Action   [address] 4CM VENTRAL POLYPR EPTFE CIR SELF EXP PTCH - SNA Mesh MESH NILDA M DIA6. 4CM VENTRAL POLYPR EPTFE CIR SELF EXP PTCH NA BARD DAVOL_WD [id number] N/A 1 Implanted       Drains: * No LDAs found *    Findings: Approximately 1 cm fascial defect    Detailed Description of Procedure: The patient was brought to the operating room and positioned on the OR table in supine position. Following the induction of general anesthesia the periumbilical region was prepped and draped out in standard sterile fashion. A surgical timeout was performed at which time the patient's identity and surgical procedure once again confirmed. A small curvilinear infraumbilical incision was made and taken down through tissues with electrocautery. The umbilical skin was then carefully dissected free of subcutaneous and fascial attachments. Once the umbilical skin had been freed up was noted the patient had an approximate 1 cm fascial defect. Madeleine Shaw clamps were placed on either side the fascial defect and myofascial flaps elevated in all directions with electrocautery. A medium Ventralex mesh was then used to repair the hernia. This was placed within the abdomen arm after incision was then flat against abdominal wall suture with #1 Vicryl sutures to the wings of the mesh. Native tissues were then closed over the mesh using #1 Vicryl sutures.     0.5% Marcaine with epinephrine was infiltrated at the fascial level. The umbilicus was then tacked down using 3-0 Vicryl sutures. Skin was closed with 3-0 Vicryl deep dermal and 4-0 Monocryl subcuticular suture. Additional 0.5% Marcaine with epinephrine was infiltrated the skin level. Dermabond dressings applied and the patient awakened, extubated, taken recovery in stable condition.   All counts were correct at the close of the case    Electronically Signed by Araseli Segovia MD on 11/18/2022 at 8:25 AM

## 2022-12-08 ENCOUNTER — OFFICE VISIT (OUTPATIENT)
Dept: SURGERY | Age: 42
End: 2022-12-08
Payer: MEDICAID

## 2022-12-08 VITALS
OXYGEN SATURATION: 98 % | SYSTOLIC BLOOD PRESSURE: 130 MMHG | HEART RATE: 89 BPM | BODY MASS INDEX: 35.85 KG/M2 | RESPIRATION RATE: 16 BRPM | HEIGHT: 65 IN | TEMPERATURE: 97.6 F | DIASTOLIC BLOOD PRESSURE: 88 MMHG | WEIGHT: 215.2 LBS

## 2022-12-08 DIAGNOSIS — K42.9 UMBILICAL HERNIA WITHOUT OBSTRUCTION AND WITHOUT GANGRENE: Primary | ICD-10-CM

## 2022-12-08 PROCEDURE — 99024 POSTOP FOLLOW-UP VISIT: CPT | Performed by: COLON & RECTAL SURGERY

## 2022-12-08 NOTE — PROGRESS NOTES
Identified pt with two pt identifiers (name and ). Reviewed chart in preparation for visit and have obtained necessary documentation. Rene Mackenzie is a 43 y.o. female  Chief Complaint   Patient presents with    Post OP Follow Up     Hernia repair complains of soreness      Visit Vitals  /88 (BP 1 Location: Left upper arm, BP Patient Position: Sitting)   Pulse 89   Temp 97.6 °F (36.4 °C) (Temporal)   Resp 16   Ht 5' 5\" (1.651 m)   Wt 215 lb 3.2 oz (97.6 kg)   LMP 2015 (Approximate)   SpO2 98%   BMI 35.81 kg/m²       1. Have you been to the ER, urgent care clinic since your last visit? Hospitalized since your last visit? No    2. Have you seen or consulted any other health care providers outside of the [address] [place] [address] since your last visit? Include any pap smears or colon screening.  No

## 2022-12-08 NOTE — LETTER
1/11/2023    Patient: Sang Jones   YOB: 1980   Date of Visit: 12/8/2022     Sandra Perez NP  [address] [place] [address] Rd  [place] [address] [address]  [place] [place]    Dear Sandra Perez NP,      Thank you for referring Ms. Mariam Licona to [address] [place] [address] for evaluation. My notes for this consultation are attached. If you have questions, please do not hesitate to call me. I look forward to following your patient along with you.       Sincerely,    Jonnathan Nguyen MD

## 2022-12-12 DIAGNOSIS — F90.2 ADHD (ATTENTION DEFICIT HYPERACTIVITY DISORDER), COMBINED TYPE: ICD-10-CM

## 2022-12-13 RX ORDER — LISDEXAMFETAMINE DIMESYLATE 70 MG/1
CAPSULE ORAL
Qty: 30 CAPSULE | Refills: 0 | Status: SHIPPED | OUTPATIENT
Start: 2022-12-15

## 2022-12-30 DIAGNOSIS — F41.1 GENERALIZED ANXIETY DISORDER: ICD-10-CM

## 2023-01-02 RX ORDER — HYDROXYZINE 50 MG/1
TABLET, FILM COATED ORAL
Qty: 10 TABLET | Refills: 5 | Status: SHIPPED | OUTPATIENT
Start: 2023-01-02

## 2023-01-03 ENCOUNTER — OFFICE VISIT (OUTPATIENT)
Dept: FAMILY MEDICINE CLINIC | Age: 43
End: 2023-01-03
Payer: MEDICAID

## 2023-01-03 VITALS
OXYGEN SATURATION: 98 % | HEART RATE: 101 BPM | DIASTOLIC BLOOD PRESSURE: 88 MMHG | HEIGHT: 65 IN | TEMPERATURE: 98.2 F | BODY MASS INDEX: 36.04 KG/M2 | WEIGHT: 216.31 LBS | SYSTOLIC BLOOD PRESSURE: 140 MMHG | RESPIRATION RATE: 16 BRPM

## 2023-01-03 DIAGNOSIS — E78.00 PURE HYPERCHOLESTEROLEMIA: ICD-10-CM

## 2023-01-03 DIAGNOSIS — R06.83 SNORING: ICD-10-CM

## 2023-01-03 DIAGNOSIS — Z12.31 ENCOUNTER FOR SCREENING MAMMOGRAM FOR MALIGNANT NEOPLASM OF BREAST: ICD-10-CM

## 2023-01-03 DIAGNOSIS — R03.0 ELEVATED BLOOD PRESSURE READING WITHOUT DIAGNOSIS OF HYPERTENSION: ICD-10-CM

## 2023-01-03 DIAGNOSIS — Z13.220 SCREENING FOR HYPERLIPIDEMIA: ICD-10-CM

## 2023-01-03 DIAGNOSIS — Z00.00 WELLNESS EXAMINATION: Primary | ICD-10-CM

## 2023-01-03 DIAGNOSIS — E66.01 CLASS 2 SEVERE OBESITY DUE TO EXCESS CALORIES WITH SERIOUS COMORBIDITY AND BODY MASS INDEX (BMI) OF 36.0 TO 36.9 IN ADULT (HCC): ICD-10-CM

## 2023-01-03 DIAGNOSIS — F90.2 ADHD (ATTENTION DEFICIT HYPERACTIVITY DISORDER), COMBINED TYPE: ICD-10-CM

## 2023-01-03 DIAGNOSIS — Z23 ENCOUNTER FOR IMMUNIZATION: ICD-10-CM

## 2023-01-03 DIAGNOSIS — F41.1 GENERALIZED ANXIETY DISORDER: ICD-10-CM

## 2023-01-03 PROCEDURE — 99214 OFFICE O/P EST MOD 30 MIN: CPT | Performed by: NURSE PRACTITIONER

## 2023-01-03 PROCEDURE — 99396 PREV VISIT EST AGE 40-64: CPT | Performed by: NURSE PRACTITIONER

## 2023-01-03 RX ORDER — PEN NEEDLE, DIABETIC 31 GX3/16"
NEEDLE, DISPOSABLE MISCELLANEOUS
Qty: 100 PEN NEEDLE | Refills: 3 | Status: SHIPPED | OUTPATIENT
Start: 2023-01-03

## 2023-01-03 NOTE — PROGRESS NOTES
Subjective  Chief Complaint   Patient presents with    Follow-up     Anxiety, ADHD, weight     HPI:  Vitor Voss is a 43 y.o. female. Patient presents for wellness, fasting labs, and management of ADHD, anxiety, and obesity. Understands this is considered two appointments and there may be a copay for the chronic disease management portion of the visit. For chronic disease management:  Medications reviewed, taking as prescribed with no known side effects. Attributes elevated BP and HR to energy drink within the past hour. Anxiety was a bit higher over the past few months due to surgery, COVID infection, and holidays. Feels anxiety is now improving. Motivated to restart exercise once cleared by surgeon. Trying to make healthy eating decisions and limit portions. Remains displeased with her weight. Interested in weight loss meds. Requesting sleep study for snoring.      Management of ADHD-  Daily Performance: no issues  Organization: good most days  Appetite: normal, denies binge eating, weight stable  Mood: stable, denies irritability and anger  Sleep: good, not tired at work     For wellness:  Immunizations:  Flu: due   COVID: booster due  Tetanus: 2016    HCV screening: complete  LMP: hysterectomy  Pap: no longer indicated  Mammo: overdue  Smoking status: former    Moods: at goal  PHQ2: 02  Diet: fair  Exercise: unable to exercise postop, should be cleared next week  Vision exams: overdue  Dental exams: every 6 months    Past Medical History:   Diagnosis Date    ADHD (attention deficit hyperactivity disorder)     Anxiety     Asthma     CHILDHOOD    Chronic back pain     Chronic pain     DISC ISSUES IN BACK    COVID-19 virus infection 12/2022    Dental disorder     Dyspepsia and other specified disorders of function of stomach     GERD (gastroesophageal reflux disease)     PTSD (post-traumatic stress disorder)      Family History   Problem Relation Age of Onset    Anxiety Mother     Hypertension Mother     COPD Mother     OSTEOARTHRITIS Mother     Psychiatric Disorder Mother     Hypertension Father     Cancer Brother     Schizophrenia Maternal Aunt     Heart Disease Maternal Grandfather     Stroke Maternal Grandfather     Cancer Paternal Grandmother     Anesth Problems Neg Hx      Social History     Socioeconomic History    Marital status:      Spouse name: Not on file    Number of children: Not on file    Years of education: Not on file    Highest education level: Not on file   Occupational History    Not on file   Tobacco Use    Smoking status: Former     Packs/day: 0.25     Years: 19.00     Pack years: 4.75     Types: Cigarettes     Quit date: 5/1/2019     Years since quitting: 3.6    Smokeless tobacco: Never    Tobacco comments:     smokes vapor cig    Vaping Use    Vaping Use: Every day    Substances: Nicotine (lowest dose)    Devices: Pre-filled pod   Substance and Sexual Activity    Alcohol use: Yes     Comment: Socially    Drug use: No    Sexual activity: Yes     Partners: Male     Birth control/protection: Surgical   Other Topics Concern    Not on file   Social History Narrative    Not on file     Social Determinants of Health     Financial Resource Strain: Low Risk     Difficulty of Paying Living Expenses: Not hard at all   Food Insecurity: No Food Insecurity    Worried About Running Out of Food in the Last Year: Never true    Ran Out of Food in the Last Year: Never true   Transportation Needs: Not on file   Physical Activity: Not on file   Stress: Not on file   Social Connections: Not on file   Intimate Partner Violence: Not on file   Housing Stability: Not on file     Current Outpatient Medications on File Prior to Visit   Medication Sig Dispense Refill    hydrOXYzine HCL (ATARAX) 50 mg tablet take 1 tablet by mouth three times a day if needed for anxiety 10 Tablet 5    Vyvanse 70 mg cap TK 1 C PO every day 30 Capsule 0    albuterol (PROVENTIL HFA, VENTOLIN HFA, PROAIR HFA) 90 mcg/actuation inhaler inhale 2 puffs by mouth and INTO THE LUNGS every 4 hours if needed for wheezing 18 g 0    citalopram (CELEXA) 40 mg tablet Take 1 Tablet by mouth in the morning. 90 Tablet 3    loratadine (Claritin) 10 mg tablet Take 1 Tablet by mouth daily. 90 Tablet 3    lansoprazole (PREVACID) 15 mg capsule Take 15 mg by mouth Daily (before breakfast). ibuprofen (MOTRIN) 800 mg tablet TAKE 1 TABLET BY MOUTH EVERY 6 HOURS AS NEEDED FOR PAIN. TAKE WITH FOOD. 90 Tablet 0    acetaminophen (TYLENOL) 500 mg tablet Take 500 mg by mouth every six (6) hours as needed for Pain. OTC       No current facility-administered medications on file prior to visit. Allergies   Allergen Reactions    Other Food Itching     Fresh fruit, cannot be specific    Codeine Hives     Allergic to tylenol with codeine combo    Tylenol-Codeine #2 Hives    Nuts [Tree Nut] Itching     Review of Systems   Constitutional:  Negative for weight loss. HENT:  Negative for hearing loss. Denies difficulty swallowing. Eyes:  Negative for blurred vision. Respiratory:  Negative for cough, shortness of breath and wheezing. Cardiovascular:  Negative for chest pain, palpitations and leg swelling. Gastrointestinal:  Negative for abdominal pain, constipation, diarrhea and heartburn. Genitourinary:  Negative for dysuria. Musculoskeletal:  Negative for joint pain and myalgias. Neurological:  Negative for dizziness, tingling, weakness and headaches. Psychiatric/Behavioral:  Negative for depression. The patient is not nervous/anxious. Objective  Visit Vitals  BP (!) 140/88   Pulse (!) 101   Temp 98.2 °F (36.8 °C) (Temporal)   Resp 16   Ht 5' 5\" (1.651 m)   Wt 216 lb 5 oz (98.1 kg)   SpO2 98%   BMI 36.00 kg/m²       Physical Exam  Vitals and nursing note reviewed. Constitutional:       General: She is not in acute distress. Appearance: Normal appearance. She is obese. HENT:      Head: Normocephalic.    Eyes:      Extraocular Movements: Extraocular movements intact. Neck:      Thyroid: No thyroid mass, thyromegaly or thyroid tenderness. Cardiovascular:      Rate and Rhythm: Normal rate and regular rhythm. Heart sounds: Normal heart sounds. Pulmonary:      Effort: Pulmonary effort is normal.      Breath sounds: Normal breath sounds. Abdominal:      General: Bowel sounds are normal.      Palpations: Abdomen is soft. There is no mass. Tenderness: There is no abdominal tenderness. Musculoskeletal:         General: Normal range of motion. Cervical back: Normal range of motion and neck supple. Right lower leg: No edema. Left lower leg: No edema. Lymphadenopathy:      Cervical: No cervical adenopathy. Upper Body:      Right upper body: No supraclavicular adenopathy. Left upper body: No supraclavicular adenopathy. Skin:     General: Skin is warm and dry. Neurological:      General: No focal deficit present. Mental Status: She is alert and oriented to person, place, and time. Psychiatric:         Mood and Affect: Mood normal.         Behavior: Behavior normal.         Thought Content: Thought content normal.         Judgment: Judgment normal.        Assessment & Plan      ICD-10-CM ICD-9-CM    1. Wellness examination  Z00.00 V70.0       2. Screening for hyperlipidemia  Z13.220 V77.91 CBC WITH AUTOMATED DIFF      LIPID PANEL      METABOLIC PANEL, COMPREHENSIVE      3. Class 2 severe obesity due to excess calories with serious comorbidity and body mass index (BMI) of 36.0 to 36.9 in adult ([facility])  E66.01 278.01 liraglutide, weight loss, (SAXENDA) 3 mg/0.5 mL (18 mg/3 mL) pen    Z68.36 V85.36 TSH RFX ON ABNORMAL TO FREE T4      Insulin Needles, Disposable, (Mabel Pen Needle) 32 gauge x 5/32\" ndle      4. Encounter for immunization  Z23 V03.89       5. Encounter for screening mammogram for malignant neoplasm of breast  Z12.31 V76.12 [facility] MAMMO BI SCREENING INCL CAD      6.  ADHD (attention deficit hyperactivity disorder), combined type  F90.2 314.01       7. Generalized anxiety disorder  F41.1 300.02       8. Pure hypercholesterolemia  E78.00 272.0       9. Snoring  R06.83 786.09 REFERRAL TO PULMONARY DISEASE      10. Elevated blood pressure reading without diagnosis of hypertension  R03.0 796.2         Diagnoses and all orders for this visit:    1. Wellness examination  We are getting patient up-to-date on preventative measures as listed. 2. Screening for hyperlipidemia  -     CBC WITH AUTOMATED DIFF  -     LIPID PANEL  -     METABOLIC PANEL, COMPREHENSIVE    3. Class 2 severe obesity due to excess calories with serious comorbidity and body mass index (BMI) of 36.0 to 36.9 in [facility])  Discussed active lifestyle approaches including good nutrition, exercise goals, sleep hygiene, and proper weight management. Start medication as ordered. Keep a food diary and exercise log and bring to follow-up in 3 months. -     liraglutide, weight loss, (SAXENDA) 3 mg/0.5 mL (18 mg/3 mL) pen; 0.6 mg once daily for 1 week; increase by 0.6 mg daily at weekly intervals to a target dose of 3 mg once daily.  -     TSH RFX ON ABNORMAL TO FREE T4  -     Insulin Needles, Disposable, (Mabel Pen Needle) 32 gauge x 5/32\" ndle; Use daily with Saxenda pen. 4. Encounter for immunization  Receive flu vaccine and COVID booster from pharmacy or health department. 5. Encounter for screening mammogram for malignant neoplasm of breast  -     [facility] MAMMO BI SCREENING INCL CAD; Future    6. ADHD (attention deficit hyperactivity disorder), combined type  ADHD remains well controlled on current medication and dose. No changes today. 7. Generalized anxiety disorder  Anxiety is improving since recent stress. Declines dose adjustment and trial off medication today. Take Celexa daily and do not abruptly discontinue. 8. Pure hypercholesterolemia  Checking annual labs.   Continue to work on weight loss, increasing regular exercise, and low-fat/cholesterol intake to decrease cardiovascular risk. 9. Snoring  Referral as requested. -     REFERRAL TO PULMONARY DISEASE    10. Elevated blood pressure reading without diagnosis of hypertension  BP and HR are above goal in the office today. Reasonable to assume this may be due to energy drink. She was unaware of the effect these drinks were having on her heart rate and blood pressure and she will be eliminating them from her diet. she is also going to check readings outside the office after sitting quietly for 5 minutes with both feet flat on the floor and arm at heart level. Instructed to call if staying consistently greater than 140/90 on either number. In the meantime, stay hydrated, lower salt in diet and increase exercise. Aspects of this note may have been generated using voice recognition software. Despite editing, there may be some syntax errors. Follow-up and Dispositions    Return in about 3 months (around 4/3/2023) for follow up, adhd, weight. I have discussed the diagnosis with the patient and the intended plan as seen in the above orders. The patient has received an after-visit summary and questions were answered concerning future plans. I have discussed medication side effects and warnings with the patient as well.     Diaz Rodriguez NP

## 2023-01-03 NOTE — PROGRESS NOTES
Chief Complaint   Patient presents with    Follow-up     Anxiety, ADHD, weight    1. \"Have you been to the ER, urgent care clinic since your last visit? Hospitalized since your last visit? \" No    2. \"Have you seen or consulted any other health care providers outside of the [address] [place] [address] since your last visit? \" No     3. For patients aged 39-70: Has the patient had a colonoscopy / FIT/ Cologuard? NA - based on age      If the patient is female:    4. For patients aged 41-77: Has the patient had a mammogram within the past 2 years? Yes - no Care Gap present      5. For patients aged 21-65: Has the patient had a pap smear?  Yes - no Care Gap present Visit Vitals  BP (!) 152/98   Pulse (!) 112   Temp 98.2 °F (36.8 °C) (Temporal)   Resp 16   Ht 5' 5\" (1.651 m)   Wt 216 lb 5 oz (98.1 kg)   LMP 05/19/2015 (Approximate)   SpO2 98%   BMI 36.00 kg/m²      3 most recent PHQ Screens 1/3/2023   PHQ Not Done -   Little interest or pleasure in doing things Not at all   Feeling down, depressed, irritable, or hopeless Not at all   Total Score PHQ 2 0   Trouble falling or staying asleep, or sleeping too much -   Feeling tired or having little energy -   Poor appetite, weight loss, or overeating -   Feeling bad about yourself - or that you are a failure or have let yourself or your family down -   Trouble concentrating on things such as school, work, reading, or watching TV -   Moving or speaking so slowly that other people could have noticed; or the opposite being so fidgety that others notice -   Thoughts of being better off dead, or hurting yourself in some way -   PHQ 9 Score -   How difficult have these problems made it for you to do your work, take care of your home and get along with others -

## 2023-01-04 DIAGNOSIS — E78.2 MIXED HYPERLIPIDEMIA: Primary | ICD-10-CM

## 2023-01-04 LAB
ALBUMIN SERPL-MCNC: 4.8 G/DL (ref 3.8–4.8)
ALBUMIN/GLOB SERPL: 2.2 {RATIO} (ref 1.2–2.2)
ALP SERPL-CCNC: 113 IU/L (ref 44–121)
ALT SERPL-CCNC: 26 IU/L (ref 0–32)
AST SERPL-CCNC: 23 IU/L (ref 0–40)
BASOPHILS # BLD AUTO: 0 X10E3/UL (ref 0–0.2)
BASOPHILS NFR BLD AUTO: 1 %
BILIRUB SERPL-MCNC: 0.3 MG/DL (ref 0–1.2)
BUN SERPL-MCNC: 18 MG/DL (ref 6–24)
BUN/CREAT SERPL: 22 (ref 9–23)
CALCIUM SERPL-MCNC: 9.5 MG/DL (ref 8.7–10.2)
CHLORIDE SERPL-SCNC: 100 MMOL/L (ref 96–106)
CHOLEST SERPL-MCNC: 320 MG/DL (ref 100–199)
CO2 SERPL-SCNC: 21 MMOL/L (ref 20–29)
COMMENT:: ABNORMAL
CREAT SERPL-MCNC: 0.83 MG/DL (ref 0.57–1)
EGFR: 90 ML/MIN/1.73
EOSINOPHIL # BLD AUTO: 0.1 X10E3/UL (ref 0–0.4)
EOSINOPHIL NFR BLD AUTO: 2 %
ERYTHROCYTE [DISTWIDTH] IN BLOOD BY AUTOMATED COUNT: 14.9 % (ref 11.7–15.4)
GLOBULIN SER CALC-MCNC: 2.2 G/DL (ref 1.5–4.5)
GLUCOSE SERPL-MCNC: 87 MG/DL (ref 70–99)
HCT VFR BLD AUTO: 41 % (ref 34–46.6)
HDLC SERPL-MCNC: 51 MG/DL
HGB BLD-MCNC: 13.7 G/DL (ref 11.1–15.9)
IMM GRANULOCYTES # BLD AUTO: 0 X10E3/UL (ref 0–0.1)
IMM GRANULOCYTES NFR BLD AUTO: 0 %
LDLC SERPL CALC-MCNC: 202 MG/DL (ref 0–99)
LYMPHOCYTES # BLD AUTO: 1.7 X10E3/UL (ref 0.7–3.1)
LYMPHOCYTES NFR BLD AUTO: 31 %
MCH RBC QN AUTO: 31.1 PG (ref 26.6–33)
MCHC RBC AUTO-ENTMCNC: 33.4 G/DL (ref 31.5–35.7)
MCV RBC AUTO: 93 FL (ref 79–97)
MONOCYTES # BLD AUTO: 0.4 X10E3/UL (ref 0.1–0.9)
MONOCYTES NFR BLD AUTO: 7 %
NEUTROPHILS # BLD AUTO: 3.3 X10E3/UL (ref 1.4–7)
NEUTROPHILS NFR BLD AUTO: 59 %
PLATELET # BLD AUTO: 226 X10E3/UL (ref 150–450)
POTASSIUM SERPL-SCNC: 4.1 MMOL/L (ref 3.5–5.2)
PROT SERPL-MCNC: 7 G/DL (ref 6–8.5)
RBC # BLD AUTO: 4.41 X10E6/UL (ref 3.77–5.28)
SODIUM SERPL-SCNC: 139 MMOL/L (ref 134–144)
TRIGL SERPL-MCNC: 332 MG/DL (ref 0–149)
TSH SERPL DL<=0.005 MIU/L-ACNC: 2.97 UIU/ML (ref 0.45–4.5)
VLDLC SERPL CALC-MCNC: 67 MG/DL (ref 5–40)
WBC # BLD AUTO: 5.6 X10E3/UL (ref 3.4–10.8)

## 2023-01-04 RX ORDER — ATORVASTATIN CALCIUM 20 MG/1
20 TABLET, FILM COATED ORAL DAILY
Qty: 90 TABLET | Refills: 0 | Status: SHIPPED | OUTPATIENT
Start: 2023-01-04

## 2023-01-04 NOTE — PROGRESS NOTES
Your blood count is unremarkable and you do not have anemia. Her cholesterol is extremely elevated and a statin medication is advised at this time. I am sending atorvastatin 20 mg to her pharmacy to start daily. We will recheck her lipids at her next visit. Also recommend weight loss, regular exercise, and limit fat/cholesterol intake. Your glucose is normal.  Your kidney and liver function are unremarkable. Normal thyroid function.

## 2023-01-11 NOTE — PROGRESS NOTES
OFFICE VISIT NOTE    Keyla Mendoza is a 43 y.o. female who presents to the office today for:    Chief Complaint   Patient presents with    Post OP Follow Up     Hernia repair complains of soreness        Chaitanya Rivas comes in today for follow-up status post repair of umbilical hernia with mesh on November 18, 2022. She has no complaints today. She denies any complications with her incision. She denies noticing any recurrent or persistent hernia.       Past Medical History:   Diagnosis Date    ADHD (attention deficit hyperactivity disorder)     Anxiety     Asthma     CHILDHOOD    Chronic back pain     Chronic pain     DISC ISSUES IN BACK    COVID-19 virus infection 12/2022    Dental disorder     Dyspepsia and other specified disorders of function of stomach     GERD (gastroesophageal reflux disease)     PTSD (post-traumatic stress disorder)        Past Surgical History:   Procedure Laterality Date    HX BREAST REDUCTION  2002    HX CHOLECYSTECTOMY      HX CYST REMOVAL Right 05/2013    HX DILATION AND CURETTAGE      HX DILATION AND CURETTAGE      HX HEENT      HX HEMORRHOIDECTOMY      HX HERNIA REPAIR  21/80/8073    Umbilical Hernia     HX HYSTERECTOMY      HX HYSTERECTOMY  2015    HX TONSIL AND ADENOIDECTOMY      HX TUBAL LIGATION  2021    HX WISDOM TEETH EXTRACTION         Family History   Problem Relation Age of Onset    Anxiety Mother     Hypertension Mother     COPD Mother     OSTEOARTHRITIS Mother     Psychiatric Disorder Mother     Hypertension Father     Cancer Brother     Schizophrenia Maternal Aunt     Heart Disease Maternal Grandfather     Stroke Maternal Grandfather     Cancer Paternal Grandmother     Anesth Problems Neg Hx        Social History     Socioeconomic History    Marital status:      Spouse name: Not on file    Number of children: Not on file    Years of education: Not on file    Highest education level: Not on file   Occupational History    Not on file   Tobacco Use    Smoking status: Former     Packs/day: 0.25     Years: 19.00     Pack years: 4.75     Types: Cigarettes     Quit date: 5/1/2019     Years since quitting: 3.7    Smokeless tobacco: Never    Tobacco comments:     smokes vapor cig    Vaping Use    Vaping Use: Every day    Substances: Nicotine (lowest dose)    Devices: Pre-filled pod   Substance and Sexual Activity    Alcohol use: Yes     Comment: Socially    Drug use: No    Sexual activity: Yes     Partners: Male     Birth control/protection: Surgical   Other Topics Concern    Not on file   Social History Narrative    Not on file     Social Determinants of Health     Financial Resource Strain: Low Risk     Difficulty of Paying Living Expenses: Not hard at all   Food Insecurity: No Food Insecurity    Worried About Running Out of Food in the Last Year: Never true    Ran Out of Food in the Last Year: Never true   Transportation Needs: Not on file   Physical Activity: Not on file   Stress: Not on file   Social Connections: Not on file   Intimate Partner Violence: Not on file   Housing Stability: Not on file       Allergies   Allergen Reactions    Other Food Itching     Fresh fruit, cannot be specific    Codeine Hives     Allergic to tylenol with codeine combo    Tylenol-Codeine #2 Hives    Nuts [Tree Nut] Itching       Current Outpatient Medications   Medication Sig    albuterol (PROVENTIL HFA, VENTOLIN HFA, PROAIR HFA) 90 mcg/actuation inhaler inhale 2 puffs by mouth and INTO THE LUNGS every 4 hours if needed for wheezing    citalopram (CELEXA) 40 mg tablet Take 1 Tablet by mouth in the morning. loratadine (Claritin) 10 mg tablet Take 1 Tablet by mouth daily. lansoprazole (PREVACID) 15 mg capsule Take 15 mg by mouth Daily (before breakfast). ibuprofen (MOTRIN) 800 mg tablet TAKE 1 TABLET BY MOUTH EVERY 6 HOURS AS NEEDED FOR PAIN.  TAKE WITH FOOD.    acetaminophen (TYLENOL) 500 mg tablet Take 500 mg by mouth every six (6) hours as needed for Pain. OTC    atorvastatin (LIPITOR) 20 mg tablet Take 1 Tablet by mouth daily. liraglutide, weight loss, (SAXENDA) 3 mg/0.5 mL (18 mg/3 mL) pen 0.6 mg once daily for 1 week; increase by 0.6 mg daily at weekly intervals to a target dose of 3 mg once daily. Insulin Needles, Disposable, (Mabel Pen Needle) 32 gauge x 5/32\" ndle Use daily with Saxenda pen.    hydrOXYzine HCL (ATARAX) 50 mg tablet take 1 tablet by mouth three times a day if needed for anxiety    Vyvanse 70 mg cap TK 1 C PO every day     No current facility-administered medications for this visit. Review of Systems   All other systems reviewed and are negative. /88 (BP 1 Location: Left upper arm, BP Patient Position: Sitting)   Pulse 89   Temp 97.6 °F (36.4 °C) (Temporal)   Resp 16   Ht 5' 5\" (1.651 m)   Wt 97.6 kg (215 lb 3.2 oz)   LMP 05/19/2015 (Approximate)   SpO2 98%   BMI 35.81 kg/m²     Physical Exam  Abdominal:      Comments: Infraumbilical incision is healing nicely. There is no evidence of recurrent or persistent hernia at rest or on Valsalva. Problem List Items Addressed This Visit          Other    Umbilical hernia - Primary       Assessment and Plan:  I am pleased to see that this is doing well following her surgery. I advised her to continue avoiding heavy lifting and strenuous activity. She will come back and see me in 6 weeks.               Ayo Mujica MD

## 2023-01-12 DIAGNOSIS — F90.2 ADHD (ATTENTION DEFICIT HYPERACTIVITY DISORDER), COMBINED TYPE: ICD-10-CM

## 2023-01-12 RX ORDER — LISDEXAMFETAMINE DIMESYLATE 70 MG/1
CAPSULE ORAL
Qty: 30 CAPSULE | Refills: 0 | Status: SHIPPED | OUTPATIENT
Start: 2023-01-12

## 2023-01-24 ENCOUNTER — HOSPITAL ENCOUNTER (OUTPATIENT)
Dept: MAMMOGRAPHY | Age: 43
Discharge: HOME | End: 2023-01-24
Payer: MEDICAID

## 2023-01-24 DIAGNOSIS — Z12.31 ENCOUNTER FOR SCREENING MAMMOGRAM FOR MALIGNANT NEOPLASM OF BREAST: ICD-10-CM

## 2023-01-24 PROCEDURE — 77063 BREAST TOMOSYNTHESIS BI: CPT

## 2023-02-01 DIAGNOSIS — E66.01 CLASS 2 SEVERE OBESITY DUE TO EXCESS CALORIES WITH SERIOUS COMORBIDITY AND BODY MASS INDEX (BMI) OF 36.0 TO 36.9 IN ADULT (HCC): ICD-10-CM

## 2023-02-01 RX ORDER — PEN NEEDLE, DIABETIC 31 GX3/16"
NEEDLE, DISPOSABLE MISCELLANEOUS
Qty: 100 PEN NEEDLE | Refills: 3 | Status: SHIPPED | OUTPATIENT
Start: 2023-02-01

## 2023-02-01 RX ORDER — LIRAGLUTIDE 6 MG/ML
INJECTION, SOLUTION SUBCUTANEOUS
Qty: 12 ML | Refills: 0 | Status: SHIPPED | OUTPATIENT
Start: 2023-02-01

## 2023-02-09 DIAGNOSIS — F90.2 ADHD (ATTENTION DEFICIT HYPERACTIVITY DISORDER), COMBINED TYPE: ICD-10-CM

## 2023-02-10 RX ORDER — LISDEXAMFETAMINE DIMESYLATE 70 MG/1
CAPSULE ORAL
Qty: 30 CAPSULE | Refills: 0 | Status: SHIPPED | OUTPATIENT
Start: 2023-02-11

## 2023-03-02 DIAGNOSIS — E66.01 CLASS 2 SEVERE OBESITY DUE TO EXCESS CALORIES WITH SERIOUS COMORBIDITY AND BODY MASS INDEX (BMI) OF 36.0 TO 36.9 IN ADULT (HCC): ICD-10-CM

## 2023-03-02 RX ORDER — LIRAGLUTIDE 6 MG/ML
INJECTION, SOLUTION SUBCUTANEOUS
Qty: 12 ML | Refills: 0 | Status: SHIPPED | OUTPATIENT
Start: 2023-03-02

## 2023-03-23 DIAGNOSIS — F41.1 GENERALIZED ANXIETY DISORDER: ICD-10-CM

## 2023-03-24 RX ORDER — HYDROXYZINE 50 MG/1
TABLET, FILM COATED ORAL
Qty: 10 TABLET | Refills: 5 | Status: SHIPPED | OUTPATIENT
Start: 2023-03-24

## 2023-03-26 DIAGNOSIS — E66.01 CLASS 2 SEVERE OBESITY DUE TO EXCESS CALORIES WITH SERIOUS COMORBIDITY AND BODY MASS INDEX (BMI) OF 36.0 TO 36.9 IN ADULT (HCC): ICD-10-CM

## 2023-03-27 RX ORDER — LIRAGLUTIDE 6 MG/ML
INJECTION, SOLUTION SUBCUTANEOUS
Qty: 12 ML | Refills: 0 | Status: SHIPPED | OUTPATIENT
Start: 2023-03-27 | End: 2023-03-31 | Stop reason: SDUPTHER

## 2023-03-29 DIAGNOSIS — E78.2 MIXED HYPERLIPIDEMIA: ICD-10-CM

## 2023-03-29 RX ORDER — ATORVASTATIN CALCIUM 20 MG/1
TABLET, FILM COATED ORAL
Qty: 90 TABLET | Refills: 0 | Status: SHIPPED | OUTPATIENT
Start: 2023-03-29

## 2023-03-30 ENCOUNTER — PATIENT MESSAGE (OUTPATIENT)
Dept: FAMILY MEDICINE CLINIC | Age: 43
End: 2023-03-30

## 2023-03-30 DIAGNOSIS — E66.01 CLASS 2 SEVERE OBESITY DUE TO EXCESS CALORIES WITH SERIOUS COMORBIDITY AND BODY MASS INDEX (BMI) OF 36.0 TO 36.9 IN ADULT (HCC): ICD-10-CM

## 2023-03-30 NOTE — TELEPHONE ENCOUNTER
From: Bryson Ferguson  To: Lesly Stark NP  Sent: 3/30/2023 12:02 PM EDT  Subject: Coty Began    I am writing because [facility] pharmacy said they sent a fax about medication saxenda because the wrong dosage was sent and they want to confirm the right dose. They sent it on 27th and haven't heard back yet. I take my last dose today so I am hoping to have refill by tomorrow. Thank you! What Type Of Note Output Would You Prefer (Optional)?: Standard Output Have Your Spot(S) Been Treated In The Past?: has not been treated Hpi Title: Evaluation of Skin Lesions

## 2023-03-31 RX ORDER — LIRAGLUTIDE 6 MG/ML
3 INJECTION, SOLUTION SUBCUTANEOUS DAILY
Qty: 12 ML | Refills: 0 | Status: SHIPPED | OUTPATIENT
Start: 2023-03-31

## 2023-04-01 DIAGNOSIS — Z87.09 HISTORY OF ASTHMA: ICD-10-CM

## 2023-04-03 RX ORDER — ALBUTEROL SULFATE 90 UG/1
AEROSOL, METERED RESPIRATORY (INHALATION)
Qty: 18 G | Refills: 0 | Status: SHIPPED | OUTPATIENT
Start: 2023-04-03

## 2023-05-02 ENCOUNTER — OFFICE VISIT (OUTPATIENT)
Dept: FAMILY MEDICINE CLINIC | Age: 43
End: 2023-05-02
Payer: MEDICAID

## 2023-05-02 VITALS
BODY MASS INDEX: 32.68 KG/M2 | OXYGEN SATURATION: 98 % | HEIGHT: 65 IN | RESPIRATION RATE: 16 BRPM | HEART RATE: 101 BPM | WEIGHT: 196.13 LBS | SYSTOLIC BLOOD PRESSURE: 136 MMHG | TEMPERATURE: 98.5 F | DIASTOLIC BLOOD PRESSURE: 88 MMHG

## 2023-05-02 DIAGNOSIS — E78.00 PURE HYPERCHOLESTEROLEMIA: ICD-10-CM

## 2023-05-02 DIAGNOSIS — E66.01 CLASS 2 SEVERE OBESITY DUE TO EXCESS CALORIES WITH SERIOUS COMORBIDITY AND BODY MASS INDEX (BMI) OF 36.0 TO 36.9 IN ADULT (HCC): ICD-10-CM

## 2023-05-02 DIAGNOSIS — F90.2 ADHD (ATTENTION DEFICIT HYPERACTIVITY DISORDER), COMBINED TYPE: Primary | ICD-10-CM

## 2023-05-02 PROBLEM — K64.5 THROMBOSED EXTERNAL HEMORRHOID: Status: ACTIVE | Noted: 2023-05-02

## 2023-05-02 PROCEDURE — 99214 OFFICE O/P EST MOD 30 MIN: CPT | Performed by: NURSE PRACTITIONER

## 2023-05-03 DIAGNOSIS — E66.01 CLASS 2 SEVERE OBESITY DUE TO EXCESS CALORIES WITH SERIOUS COMORBIDITY AND BODY MASS INDEX (BMI) OF 36.0 TO 36.9 IN ADULT (HCC): ICD-10-CM

## 2023-05-03 LAB
ALBUMIN SERPL-MCNC: 4.9 G/DL (ref 3.8–4.8)
ALBUMIN/GLOB SERPL: 1.9 {RATIO} (ref 1.2–2.2)
ALP SERPL-CCNC: 101 IU/L (ref 44–121)
ALT SERPL-CCNC: 15 IU/L (ref 0–32)
AST SERPL-CCNC: 19 IU/L (ref 0–40)
BILIRUB SERPL-MCNC: 0.4 MG/DL (ref 0–1.2)
BUN SERPL-MCNC: 9 MG/DL (ref 6–24)
BUN/CREAT SERPL: 11 (ref 9–23)
CALCIUM SERPL-MCNC: 9.9 MG/DL (ref 8.7–10.2)
CHLORIDE SERPL-SCNC: 100 MMOL/L (ref 96–106)
CHOLEST SERPL-MCNC: 186 MG/DL (ref 100–199)
CO2 SERPL-SCNC: 24 MMOL/L (ref 20–29)
CREAT SERPL-MCNC: 0.79 MG/DL (ref 0.57–1)
EGFRCR SERPLBLD CKD-EPI 2021: 96 ML/MIN/1.73
GLOBULIN SER CALC-MCNC: 2.6 G/DL (ref 1.5–4.5)
GLUCOSE SERPL-MCNC: 83 MG/DL (ref 70–99)
HDLC SERPL-MCNC: 58 MG/DL
LDLC SERPL CALC-MCNC: 104 MG/DL (ref 0–99)
POTASSIUM SERPL-SCNC: 3.8 MMOL/L (ref 3.5–5.2)
PROT SERPL-MCNC: 7.5 G/DL (ref 6–8.5)
SODIUM SERPL-SCNC: 140 MMOL/L (ref 134–144)
TRIGL SERPL-MCNC: 138 MG/DL (ref 0–149)
VLDLC SERPL CALC-MCNC: 24 MG/DL (ref 5–40)

## 2023-05-04 RX ORDER — LIRAGLUTIDE 6 MG/ML
INJECTION, SOLUTION SUBCUTANEOUS
Qty: 12 ML | Refills: 0 | Status: SHIPPED | OUTPATIENT
Start: 2023-05-04 | End: 2023-05-05

## 2023-05-05 DIAGNOSIS — E66.01 CLASS 2 SEVERE OBESITY DUE TO EXCESS CALORIES WITH SERIOUS COMORBIDITY AND BODY MASS INDEX (BMI) OF 36.0 TO 36.9 IN ADULT (HCC): ICD-10-CM

## 2023-05-05 RX ORDER — LIRAGLUTIDE 6 MG/ML
INJECTION, SOLUTION SUBCUTANEOUS
Qty: 12 ML | Refills: 0 | Status: SHIPPED | OUTPATIENT
Start: 2023-05-05

## 2023-05-07 ENCOUNTER — PATIENT MESSAGE (OUTPATIENT)
Facility: CLINIC | Age: 43
End: 2023-05-07

## 2023-05-07 DIAGNOSIS — F90.2 ADHD (ATTENTION DEFICIT HYPERACTIVITY DISORDER), COMBINED TYPE: Primary | ICD-10-CM

## 2023-05-08 NOTE — TELEPHONE ENCOUNTER
From: Jose Gray  To: Amena Mejias  Sent: 5/7/2023 6:10 PM EDT  Subject: Vyvanse 70    I was wondering if you could refill my V[facility] [place]?   Thanks

## 2023-06-05 DIAGNOSIS — F90.2 ADHD (ATTENTION DEFICIT HYPERACTIVITY DISORDER), COMBINED TYPE: ICD-10-CM

## 2023-06-05 RX ORDER — HYDROXYZINE 50 MG/1
TABLET, FILM COATED ORAL
Qty: 10 TABLET | Refills: 5 | Status: SHIPPED | OUTPATIENT
Start: 2023-06-05

## 2023-06-05 NOTE — TELEPHONE ENCOUNTER
From: Maxine Doyle  To:  Office of [address] [place] [address] Drive: 6/4/2023 6:29 PM EDT  Subject: Medication Renewal Request    Refills have been requested for the following medications:     lisdexamfetamine (VYVANSE) 70 MG capsule TINA Min - NP]    Preferred pharmacy: [address] [phone] - F [phone]

## 2023-06-22 RX ORDER — LIRAGLUTIDE 6 MG/ML
INJECTION, SOLUTION SUBCUTANEOUS
Qty: 12 ML | Refills: 1 | Status: SHIPPED | OUTPATIENT
Start: 2023-06-22

## 2023-07-03 DIAGNOSIS — F90.2 ADHD (ATTENTION DEFICIT HYPERACTIVITY DISORDER), COMBINED TYPE: ICD-10-CM

## 2023-07-03 NOTE — TELEPHONE ENCOUNTER
From: Saskia Contreras  To:  Office of [address]: 7/3/2023 9:28 AM EDT  Subject: Medication Renewal Request    Refills have been requested for the following medications:     lisdexamfetamine (VYVANSE) 70 MG capsule True TINA Arrington NP]    Preferred pharmacy: [place] [address] [address] [phone] - F [phone]

## 2023-07-03 NOTE — TELEPHONE ENCOUNTER
Future Appointments  7/3/2023 - 7/2/2025   Date Visit Type Department Provider    9/5/2023  2:00 PM OFFICE VISIT [facility] [place] Number, APRN - NP   Appointment Notes:    Return in about 4 months (around 9/2/2023) for follow up, adhd, weight, office visit.

## 2023-07-04 RX ORDER — ATORVASTATIN CALCIUM 20 MG/1
TABLET, FILM COATED ORAL
Qty: 90 TABLET | Refills: 3 | Status: SHIPPED | OUTPATIENT
Start: 2023-07-04

## 2023-07-06 RX ORDER — ALBUTEROL SULFATE 90 UG/1
AEROSOL, METERED RESPIRATORY (INHALATION)
Qty: 18 G | Refills: 0 | Status: SHIPPED | OUTPATIENT
Start: 2023-07-06

## 2023-08-04 DIAGNOSIS — F90.2 ADHD (ATTENTION DEFICIT HYPERACTIVITY DISORDER), COMBINED TYPE: ICD-10-CM

## 2023-08-04 NOTE — TELEPHONE ENCOUNTER
From: Marisa Prieto  To:  Office of Teri Tran  Sent: 8/2/2023 5:38 PM EDT  Subject: Medication Renewal Request    Refills have been requested for the following medications:     lisdexamfetamine (VYVANSE) 70 MG capsule Teri Tran, APRN - NP]    [address]erred pharmacy: [address] [phone] - F [phone]

## 2023-08-22 RX ORDER — HYDROXYZINE 50 MG/1
TABLET, FILM COATED ORAL
Qty: 10 TABLET | Refills: 5 | Status: SHIPPED | OUTPATIENT
Start: 2023-08-22

## 2023-08-22 RX ORDER — CITALOPRAM 40 MG/1
TABLET ORAL
Qty: 90 TABLET | Refills: 3 | Status: SHIPPED | OUTPATIENT
Start: 2023-08-22

## 2023-08-30 RX ORDER — LIRAGLUTIDE 6 MG/ML
INJECTION, SOLUTION SUBCUTANEOUS
Qty: 12 ML | Refills: 1 | Status: SHIPPED | OUTPATIENT
Start: 2023-08-30

## 2023-08-31 DIAGNOSIS — F90.2 ADHD (ATTENTION DEFICIT HYPERACTIVITY DISORDER), COMBINED TYPE: ICD-10-CM

## 2023-08-31 NOTE — TELEPHONE ENCOUNTER
From: Brando Guzman  To:  Office of [address]: 8/31/2023 12:07 PM EDT  Subject: Medication Renewal Request    Refills have been requested for the following medications:     lisdexamfetamine (VYVANSE) 70 MG capsule TINA Breen - NP]    Preferred pharmacy: Stacey Hale [address] [place] [address] [address] [facility] [address] [phone] - F [phone]

## 2023-09-05 ENCOUNTER — OFFICE VISIT (OUTPATIENT)
Facility: CLINIC | Age: 43
End: 2023-09-05
Payer: MEDICAID

## 2023-09-05 VITALS
WEIGHT: 193 LBS | BODY MASS INDEX: 32.15 KG/M2 | SYSTOLIC BLOOD PRESSURE: 128 MMHG | TEMPERATURE: 98.3 F | OXYGEN SATURATION: 97 % | HEART RATE: 86 BPM | DIASTOLIC BLOOD PRESSURE: 82 MMHG | HEIGHT: 65 IN

## 2023-09-05 DIAGNOSIS — F90.2 ADHD (ATTENTION DEFICIT HYPERACTIVITY DISORDER), COMBINED TYPE: Primary | ICD-10-CM

## 2023-09-05 DIAGNOSIS — E66.01 CLASS 2 SEVERE OBESITY DUE TO EXCESS CALORIES WITH SERIOUS COMORBIDITY AND BODY MASS INDEX (BMI) OF 36.0 TO 36.9 IN ADULT (HCC): ICD-10-CM

## 2023-09-05 PROCEDURE — 99214 OFFICE O/P EST MOD 30 MIN: CPT | Performed by: NURSE PRACTITIONER

## 2023-09-05 SDOH — ECONOMIC STABILITY: FOOD INSECURITY: WITHIN THE PAST 12 MONTHS, THE FOOD YOU BOUGHT JUST DIDN'T LAST AND YOU DIDN'T HAVE MONEY TO GET MORE.: NEVER TRUE

## 2023-09-05 SDOH — ECONOMIC STABILITY: FOOD INSECURITY: WITHIN THE PAST 12 MONTHS, YOU WORRIED THAT YOUR FOOD WOULD RUN OUT BEFORE YOU GOT MONEY TO BUY MORE.: NEVER TRUE

## 2023-09-05 SDOH — ECONOMIC STABILITY: INCOME INSECURITY: HOW HARD IS IT FOR YOU TO PAY FOR THE VERY BASICS LIKE FOOD, HOUSING, MEDICAL CARE, AND HEATING?: NOT HARD AT ALL

## 2023-09-05 SDOH — ECONOMIC STABILITY: HOUSING INSECURITY
IN THE LAST 12 MONTHS, WAS THERE A TIME WHEN YOU DID NOT HAVE A STEADY PLACE TO SLEEP OR SLEPT IN A SHELTER (INCLUDING NOW)?: NO

## 2023-09-05 ASSESSMENT — PATIENT HEALTH QUESTIONNAIRE - PHQ9
SUM OF ALL RESPONSES TO PHQ QUESTIONS 1-9: 0
SUM OF ALL RESPONSES TO PHQ9 QUESTIONS 1 & 2: 0
2. FEELING DOWN, DEPRESSED OR HOPELESS: 0
1. LITTLE INTEREST OR PLEASURE IN DOING THINGS: 0
SUM OF ALL RESPONSES TO PHQ QUESTIONS 1-9: 0

## 2023-10-02 DIAGNOSIS — F90.2 ADHD (ATTENTION DEFICIT HYPERACTIVITY DISORDER), COMBINED TYPE: ICD-10-CM

## 2023-10-02 RX ORDER — LISDEXAMFETAMINE DIMESYLATE CAPSULES 70 MG/1
CAPSULE ORAL
Qty: 30 CAPSULE | Refills: 0 | Status: SHIPPED | OUTPATIENT
Start: 2023-10-05 | End: 2023-10-28

## 2023-10-02 NOTE — TELEPHONE ENCOUNTER
From: Niru Umana  To:  Office of [address]: 10/2/2023 10:45 AM EDT  Subject: Medication Renewal Request    Refills have been requested for the following medications:     lisdexamfetamine (VYVANSE) 70 MG capsule TINA Higgins - NP]    Preferred pharmacy: P[facility]ito Morfin [address] [place] [address] Pkwy [phone] - F [phone]

## 2023-10-25 RX ORDER — HYDROXYZINE 50 MG/1
TABLET, FILM COATED ORAL
Qty: 10 TABLET | Refills: 5 | Status: SHIPPED | OUTPATIENT
Start: 2023-10-25

## 2023-10-31 DIAGNOSIS — F90.2 ADHD (ATTENTION DEFICIT HYPERACTIVITY DISORDER), COMBINED TYPE: ICD-10-CM

## 2023-10-31 RX ORDER — LISDEXAMFETAMINE DIMESYLATE CAPSULES 70 MG/1
CAPSULE ORAL
Qty: 30 CAPSULE | Refills: 0 | Status: SHIPPED | OUTPATIENT
Start: 2023-11-04 | End: 2023-11-23

## 2023-10-31 NOTE — TELEPHONE ENCOUNTER
From: Vashti Erickson  To:  Office of [address]: 10/31/2023 1:56 PM EDT  Subject: Medication Renewal Request    Refills have been requested for the following medications:     lisdexamfetamine (VYVANSE) 70 MG capsule Cristobal Calvert APRN - NP]    Preferred pharmacy: Vianca Diaz [address], [address]

## 2023-11-28 DIAGNOSIS — E66.01 CLASS 2 SEVERE OBESITY DUE TO EXCESS CALORIES WITH SERIOUS COMORBIDITY AND BODY MASS INDEX (BMI) OF 36.0 TO 36.9 IN ADULT (HCC): Primary | ICD-10-CM

## 2023-11-28 RX ORDER — SEMAGLUTIDE 0.25 MG/.5ML
0.25 INJECTION, SOLUTION SUBCUTANEOUS
Qty: 2 ML | Refills: 0 | Status: SHIPPED | OUTPATIENT
Start: 2023-11-28

## 2023-12-01 DIAGNOSIS — F90.2 ADHD (ATTENTION DEFICIT HYPERACTIVITY DISORDER), COMBINED TYPE: ICD-10-CM

## 2023-12-01 RX ORDER — LISDEXAMFETAMINE DIMESYLATE CAPSULES 70 MG/1
CAPSULE ORAL
Qty: 30 CAPSULE | Refills: 0 | Status: SHIPPED | OUTPATIENT
Start: 2023-12-03 | End: 2023-12-20

## 2023-12-01 NOTE — TELEPHONE ENCOUNTER
From: Barbara Underwood  To:  Office of [address]: 12/1/2023 10:25 AM EST  Subject: Medication Renewal Request    Refills have been requested for the following medications:     lisdexamfetamine (VYVANSE) 70 MG capsule Breanne Chavez, APRN - NP]    Preferred pharmacy: Jewel Villegas [address], [address] [place] [address],[address] [phone]

## 2023-12-31 ENCOUNTER — PATIENT MESSAGE (OUTPATIENT)
Facility: CLINIC | Age: 43
End: 2023-12-31

## 2023-12-31 DIAGNOSIS — E66.01 CLASS 2 SEVERE OBESITY DUE TO EXCESS CALORIES WITH SERIOUS COMORBIDITY AND BODY MASS INDEX (BMI) OF 36.0 TO 36.9 IN ADULT (HCC): ICD-10-CM

## 2024-01-02 DIAGNOSIS — F90.2 ADHD (ATTENTION DEFICIT HYPERACTIVITY DISORDER), COMBINED TYPE: ICD-10-CM

## 2024-01-02 RX ORDER — LISDEXAMFETAMINE DIMESYLATE CAPSULES 70 MG/1
CAPSULE ORAL
Qty: 30 CAPSULE | Refills: 0 | Status: SHIPPED | OUTPATIENT
Start: 2024-01-02 | End: 2024-01-19

## 2024-01-02 RX ORDER — SEMAGLUTIDE 0.25 MG/.5ML
0.25 INJECTION, SOLUTION SUBCUTANEOUS
Qty: 2 ML | Refills: 0 | Status: SHIPPED | OUTPATIENT
Start: 2024-01-02

## 2024-01-02 NOTE — TELEPHONE ENCOUNTER
From: Bettina Biggs  To: Paola Jones  Sent: 12/31/2023 2:09 PM EST  Subject: Wegovy request    Hello! I was wondering if you could send to [facility] [place] on [address]? The [place] one I have a difficult time even getting someone on the phone. I found out my insurance does cover it, but I have still yet to be able to get it filled. I was hoping that sending a new script (to [facility]) might help me at least contact them more to check on status. Thanks so much and happy new year!!

## 2024-01-02 NOTE — TELEPHONE ENCOUNTER
From: Bettina Biggs  To: Office of Paola Jones  Sent: 12/31/2023 2:10 PM EST  Subject: Medication Renewal Request    Refills have been requested for the following medications:     lisdexamfetamine (VYVANSE) 70 MG capsule [Paola Jones, APRN - NP]    Preferred pharmacy: [id number] [place] [id number] [facility] [phone] -  [phone]

## 2024-01-03 RX ORDER — HYDROXYZINE 50 MG/1
TABLET, FILM COATED ORAL
Qty: 10 TABLET | Refills: 5 | Status: SHIPPED | OUTPATIENT
Start: 2024-01-03

## 2024-01-31 DIAGNOSIS — F90.2 ADHD (ATTENTION DEFICIT HYPERACTIVITY DISORDER), COMBINED TYPE: ICD-10-CM

## 2024-01-31 RX ORDER — LISDEXAMFETAMINE DIMESYLATE CAPSULES 70 MG/1
CAPSULE ORAL
Qty: 30 CAPSULE | Refills: 0 | Status: SHIPPED | OUTPATIENT
Start: 2024-01-31 | End: 2024-02-17

## 2024-01-31 NOTE — TELEPHONE ENCOUNTER
From: Bettina Biggs  To: Office of Paola Jones  Sent: 1/30/2024 8:16 PM EST  Subject: Medication Renewal Request    Refills have been requested for the following medications:     lisdexamfetamine (VYVANSE) 70 MG capsule [Paola Jones, APRN - NP]    Preferred pharmacy: [id number] [place] [address] [facility] [phone] -  [phone]

## 2024-02-09 ENCOUNTER — TELEMEDICINE (OUTPATIENT)
Facility: CLINIC | Age: 44
End: 2024-02-09
Payer: MEDICAID

## 2024-02-09 DIAGNOSIS — F90.2 ADHD (ATTENTION DEFICIT HYPERACTIVITY DISORDER), COMBINED TYPE: Primary | ICD-10-CM

## 2024-02-09 DIAGNOSIS — E66.01 CLASS 2 SEVERE OBESITY DUE TO EXCESS CALORIES WITH SERIOUS COMORBIDITY AND BODY MASS INDEX (BMI) OF 36.0 TO 36.9 IN ADULT (HCC): ICD-10-CM

## 2024-02-09 DIAGNOSIS — F41.1 GENERALIZED ANXIETY DISORDER: ICD-10-CM

## 2024-02-09 PROCEDURE — 99214 OFFICE O/P EST MOD 30 MIN: CPT | Performed by: NURSE PRACTITIONER

## 2024-02-09 ASSESSMENT — PATIENT HEALTH QUESTIONNAIRE - PHQ9
2. FEELING DOWN, DEPRESSED OR HOPELESS: 0
SUM OF ALL RESPONSES TO PHQ QUESTIONS 1-9: 0
1. LITTLE INTEREST OR PLEASURE IN DOING THINGS: 0
SUM OF ALL RESPONSES TO PHQ QUESTIONS 1-9: 0
SUM OF ALL RESPONSES TO PHQ9 QUESTIONS 1 & 2: 0

## 2024-02-09 NOTE — PROGRESS NOTES
Chief Complaint   Patient presents with    Follow-up     Med refill    1. Have you been to the ER, urgent care clinic since your last visit?  Hospitalized since your last visit?No    2. Have you seen or consulted any other health care providers outside of the [facility] since your last visit?   No     3. For patients aged 45-75: Has the patient had a colonoscopy / FIT/ Cologuard? NA - based on age/sex    If the patient is female:    4. For patients aged 40-74: Has the patient had a mammogram within the past 2 years?  Yes-No Care Gap Present      5. For patients aged 21-65: Has the patient had a pap smear?  Yes-No Care Gap PresentThere were no vitals taken for this visit. PHQ-9 Total Score: 0 (2/9/2024  2:41 PM)     
Yes Paola Jones APRN - NP   atorvastatin (LIPITOR) 20 MG tablet take 1 tablet by mouth once daily 7/4/23  Yes Paola Jones APRN - NP   acetaminophen (TYLENOL) 500 MG tablet Take 1 tablet by mouth every 6 hours as needed   Yes Automatic Reconciliation, Ar   ibuprofen (ADVIL;MOTRIN) 800 MG tablet TAKE 1 TABLET BY MOUTH EVERY 6 HOURS AS NEEDED FOR PAIN. TAKE WITH FOOD. 10/5/21  Yes Automatic Reconciliation, Ar   lansoprazole (PREVACID) 15 MG delayed release capsule Take 1 capsule by mouth every morning (before breakfast)   Yes Automatic Reconciliation, Ar   loratadine (CLARITIN) 10 MG tablet Take 1 tablet by mouth daily 3/3/22  Yes Automatic Reconciliation, Ar     Allergies   Allergen Reactions    Codeine Hives     Allergic to tylenol with codeine combo    Macadamia Nut Oil Itching    Other Itching     Fresh fruit, cannot be specific     No problem-specific Assessment & Plan notes found for this encounter.         Objective:   Vital Signs: (As obtained by patient/caregiver at home)  There were no vitals taken for this visit.     [INSTRUCTIONS:  \"[x]\" Indicates a positive item  \"[]\" Indicates a negative item  -- DELETE ALL ITEMS NOT EXAMINED]    Constitutional: [x] Appears well-developed and well-nourished [x] No apparent distress      [] Abnormal -     Mental status: [x] Alert and awake  [x] Oriented to person/place/time [x] Able to follow commands    [] Abnormal -     Eyes:   EOM    [x]  Normal    [] Abnormal -   Sclera  [x]  Normal    [] Abnormal -          Discharge [x]  None visible   [] Abnormal -     HENT: [x] Normocephalic, atraumatic  [] Abnormal -   [x] Mouth/Throat: Mucous membranes are moist    External Ears [x] Normal  [] Abnormal -    Neck: [x] No visualized mass [] Abnormal -     Pulmonary/Chest: [x] Respiratory effort normal   [x] No visualized signs of difficulty breathing or respiratory distress        [] Abnormal -        Neurological:        [x] No Facial Asymmetry (Cranial nerve 7

## 2024-02-12 ENCOUNTER — PATIENT MESSAGE (OUTPATIENT)
Facility: CLINIC | Age: 44
End: 2024-02-12

## 2024-02-12 DIAGNOSIS — E66.01 CLASS 2 SEVERE OBESITY DUE TO EXCESS CALORIES WITH SERIOUS COMORBIDITY AND BODY MASS INDEX (BMI) OF 36.0 TO 36.9 IN ADULT (HCC): Primary | ICD-10-CM

## 2024-02-12 NOTE — TELEPHONE ENCOUNTER
From: Bettina Biggs  To: Paola Jones  Sent: 2/12/2024 3:27 PM EST  Subject: Zepbound    Hello! The pharmacy sent a prior authorization for the zepbound. I just wanted to make sure you received it to fill out for insurance. Crossing my fingers, insurance will cover it. Thank you so much..

## 2024-02-12 NOTE — TELEPHONE ENCOUNTER
Appears insurance denied coverage.  Patient has to try and fail Wegovy, Contrave, Lomaira, diethylpropion immediate or extended release or phendimetrazine immediate or extended relase.

## 2024-02-14 RX ORDER — SEMAGLUTIDE 0.25 MG/.5ML
0.25 INJECTION, SOLUTION SUBCUTANEOUS
Qty: 2 ML | Refills: 0 | Status: SHIPPED | OUTPATIENT
Start: 2024-02-14

## 2024-02-16 RX ORDER — SEMAGLUTIDE 0.25 MG/.5ML
0.25 INJECTION, SOLUTION SUBCUTANEOUS
Qty: 2 ML | Refills: 0 | Status: SHIPPED | OUTPATIENT
Start: 2024-02-16

## 2024-02-23 ENCOUNTER — PATIENT MESSAGE (OUTPATIENT)
Facility: CLINIC | Age: 44
End: 2024-02-23

## 2024-02-23 NOTE — TELEPHONE ENCOUNTER
From: Bettina Biggs  To: Paola Jones  Sent: 2/23/2024 2:44 PM EST  Subject: Appealed zepbound    So I got a letter in the mail today regarding zepbound denial and why it was denied, and what reasons were for them to approve. It said that one of the reasons it could be covered is because the listed preferred drugs I was unable to use for reasons. I mentioned that I didn't know if not being able to get wegovy would be a good enough reason, and I also mentioned that some of the medications I can't do is because of interactions with my other medicine, which is something you told me before when you were looking into prescribing me medicine.  Anyway, she sent an appeal but she also told me you can submit a new prior authorization mentioning same info to see if it can speed up process of getting approved, so I was wondering if you had the time, if you can submit a new PA with any of that information you may not have included in last one? And maybe also mention I did try saxenda already, and have been trying to do wegovy but am unable to get it.   I felipe, she sounded pretty confident it could get approved  I attached a photo of what they said guidelines to follow was.  Anyway, thank you so much for all your help. Sorry for lengthy letter lol.    Sincerely, Bettina

## 2024-02-28 DIAGNOSIS — F90.2 ADHD (ATTENTION DEFICIT HYPERACTIVITY DISORDER), COMBINED TYPE: ICD-10-CM

## 2024-02-28 RX ORDER — LISDEXAMFETAMINE DIMESYLATE CAPSULES 70 MG/1
CAPSULE ORAL
Qty: 30 CAPSULE | Refills: 0 | Status: SHIPPED | OUTPATIENT
Start: 2024-02-29 | End: 2024-03-16

## 2024-02-28 NOTE — TELEPHONE ENCOUNTER
From: Bettina Biggs  To: Office of Paola Jones  Sent: 2/28/2024 2:21 PM EST  Subject: Medication Renewal Request    Refills have been requested for the following medications:     lisdexamfetamine (VYVANSE) 70 MG capsule [Paola Jones, APRN - NP]    Pref[facility] pharmacy[id number] [place] [address] [facility] [phone] -  [phone]

## 2024-03-04 ENCOUNTER — TELEPHONE (OUTPATIENT)
Facility: CLINIC | Age: 44
End: 2024-03-04

## 2024-03-04 DIAGNOSIS — E66.01 CLASS 2 SEVERE OBESITY DUE TO EXCESS CALORIES WITH SERIOUS COMORBIDITY AND BODY MASS INDEX (BMI) OF 36.0 TO 36.9 IN ADULT (HCC): Primary | ICD-10-CM

## 2024-03-04 RX ORDER — SEMAGLUTIDE 0.25 MG/.5ML
0.25 INJECTION, SOLUTION SUBCUTANEOUS
Qty: 2 ML | Refills: 0 | Status: SHIPPED | OUTPATIENT
Start: 2024-03-04

## 2024-03-04 NOTE — TELEPHONE ENCOUNTER
----- Message from Bettina Biggs sent at 3/4/2024  2:31 PM EST -----  Regarding: Wegovy prescription transfer  Contact: [phone]  So, as of now, zepbound is still denied, however the appeal is still in process.  And [facility] still has not been able to get wegovy, however, I found out that my insurance does offer [facility] home delivery for wegovy,  so I tried doing a prescription transfer in hopes that they will have more success getting it. I attached a screenshot showing it is pending approval through you, not sure if you guys got that request? Hopefully this will be better to get the wegovy. This is so frustrating!  Thanks for all your help as always!

## 2024-03-21 RX ORDER — HYDROXYZINE 50 MG/1
TABLET, FILM COATED ORAL
Qty: 10 TABLET | Refills: 5 | Status: SHIPPED | OUTPATIENT
Start: 2024-03-21

## 2024-03-25 ENCOUNTER — PATIENT MESSAGE (OUTPATIENT)
Facility: CLINIC | Age: 44
End: 2024-03-25

## 2024-03-25 DIAGNOSIS — E66.01 CLASS 2 SEVERE OBESITY DUE TO EXCESS CALORIES WITH SERIOUS COMORBIDITY AND BODY MASS INDEX (BMI) OF 36.0 TO 36.9 IN ADULT (HCC): Primary | ICD-10-CM

## 2024-03-26 NOTE — TELEPHONE ENCOUNTER
From: Bettina Biggs  To: Paola Jones  Sent: 3/25/2024 8:46 PM EDT  Subject: Zepbound    After a HORRIBLE night at work, i came home to a letter from [facility] saying they overturned the appeal and I am approved for zepbound!!!! I am so overjoyed!!!! So, I was hoping you could [facility] the [facility] [place]? I still have not been able to get wegovy and I have been stressing about it so I am so happy right now!!  Thank you!!!

## 2024-03-27 DIAGNOSIS — F90.2 ADHD (ATTENTION DEFICIT HYPERACTIVITY DISORDER), COMBINED TYPE: ICD-10-CM

## 2024-03-27 RX ORDER — LISDEXAMFETAMINE DIMESYLATE CAPSULES 70 MG/1
CAPSULE ORAL
Qty: 30 CAPSULE | Refills: 0 | Status: SHIPPED | OUTPATIENT
Start: 2024-03-29 | End: 2024-04-12

## 2024-03-27 NOTE — TELEPHONE ENCOUNTER
From: Bettina Biggs  To: Office of Paola Jones  Sent: 3/27/2024 11:07 AM EDT  Subject: Medication Renewal Request    Refills have been requested for the following medications:     lisdexamfetamine (VYVANSE) 70 MG capsule [Paola Jones, APRN - NP]   Patient Comment: Wondering I it could be sent before 31st. Insurance ends on 31st due to an error on  end, going back today to try to get figured out (thought I got them to fix last week) so just in case it is not fixed before 31, I was hoping to get prescription by at least 30th. Thanks so much!!    Preferred pharmacy: [id number] - [place] - [address] [facility] [phone] -  [phone]

## 2024-03-28 ENCOUNTER — PATIENT MESSAGE (OUTPATIENT)
Facility: CLINIC | Age: 44
End: 2024-03-28

## 2024-03-28 DIAGNOSIS — E66.01 CLASS 2 SEVERE OBESITY DUE TO EXCESS CALORIES WITH SERIOUS COMORBIDITY AND BODY MASS INDEX (BMI) OF 36.0 TO 36.9 IN ADULT (HCC): ICD-10-CM

## 2024-03-28 NOTE — TELEPHONE ENCOUNTER
From: Bettina Biggs  To: Paola Jones  Sent: 3/28/2024 12:06 PM EDT  Subject: Zepbound     I want to cry! [facility]'t know when they will get zepbound in. Can you try sending it to [facility]? May be they have it in stock? I'm so frustrated with this.

## 2024-04-14 ENCOUNTER — PATIENT MESSAGE (OUTPATIENT)
Facility: CLINIC | Age: 44
End: 2024-04-14

## 2024-04-14 DIAGNOSIS — E66.01 CLASS 2 SEVERE OBESITY DUE TO EXCESS CALORIES WITH SERIOUS COMORBIDITY AND BODY MASS INDEX (BMI) OF 36.0 TO 36.9 IN ADULT (HCC): Primary | ICD-10-CM

## 2024-04-15 NOTE — TELEPHONE ENCOUNTER
From: Bettina Biggs  To: Paola Jones  Sent: 4/14/2024 3:53 PM EDT  Subject: Zepbound    Hello! Can you send the next dosage up of zepbound to [facility]? I dont take my 3rd dose till Thursday but since it's a different strength, they may not give issues and it will give more time to get in stock.  Thank you!

## 2024-04-26 DIAGNOSIS — F90.2 ADHD (ATTENTION DEFICIT HYPERACTIVITY DISORDER), COMBINED TYPE: ICD-10-CM

## 2024-04-26 RX ORDER — LISDEXAMFETAMINE DIMESYLATE CAPSULES 70 MG/1
CAPSULE ORAL
Qty: 30 CAPSULE | Refills: 0 | Status: SHIPPED | OUTPATIENT
Start: 2024-04-27 | End: 2024-05-10

## 2024-04-26 NOTE — TELEPHONE ENCOUNTER
From: Bettina Biggs  To: Office of Paola Jones  Sent: 4/25/2024 10:29 PM EDT  Subject: Medication Renewal Request    Refills have been requested for the following medications:     lisdexamfetamine (VYVANSE) 70 MG capsule [Paola Jones, APRN - NP]    [id number] [address] [facility] [phone] [facility] [phone]

## 2024-05-24 DIAGNOSIS — F90.2 ADHD (ATTENTION DEFICIT HYPERACTIVITY DISORDER), COMBINED TYPE: ICD-10-CM

## 2024-05-24 RX ORDER — HYDROXYZINE 50 MG/1
TABLET, FILM COATED ORAL
Qty: 10 TABLET | Refills: 5 | Status: SHIPPED | OUTPATIENT
Start: 2024-05-24

## 2024-05-24 NOTE — TELEPHONE ENCOUNTER
From: Bettina Biggs  To: Office of Paola Jones  Sent: 5/24/2024 10:39 AM EDT  Subject: Medication Renewal Request    Refills have been requested for the following medications:     lisdexamfetamine (VYVANSE) 70 MG capsule [Paola Jones, APRN - NP]    [id number] [address] [facility] [phone] [facility] [phone]

## 2024-05-27 RX ORDER — LISDEXAMFETAMINE DIMESYLATE 70 MG/1
CAPSULE ORAL
Qty: 30 CAPSULE | Refills: 0 | Status: SHIPPED | OUTPATIENT
Start: 2024-05-27 | End: 2024-06-06

## 2024-05-30 ENCOUNTER — OFFICE VISIT (OUTPATIENT)
Facility: CLINIC | Age: 44
End: 2024-05-30
Payer: MEDICAID

## 2024-05-30 ENCOUNTER — TELEPHONE (OUTPATIENT)
Facility: CLINIC | Age: 44
End: 2024-05-30

## 2024-05-30 VITALS
WEIGHT: 206.25 LBS | HEIGHT: 65 IN | TEMPERATURE: 97.5 F | HEART RATE: 100 BPM | DIASTOLIC BLOOD PRESSURE: 88 MMHG | SYSTOLIC BLOOD PRESSURE: 142 MMHG | OXYGEN SATURATION: 98 % | BODY MASS INDEX: 34.36 KG/M2 | RESPIRATION RATE: 16 BRPM

## 2024-05-30 DIAGNOSIS — E78.00 PURE HYPERCHOLESTEROLEMIA: ICD-10-CM

## 2024-05-30 DIAGNOSIS — Z12.31 BREAST CANCER SCREENING BY MAMMOGRAM: ICD-10-CM

## 2024-05-30 DIAGNOSIS — Z78.9 VARICELLA VACCINATION STATUS UNKNOWN: ICD-10-CM

## 2024-05-30 DIAGNOSIS — R03.0 ELEVATED BLOOD PRESSURE READING WITHOUT DIAGNOSIS OF HYPERTENSION: ICD-10-CM

## 2024-05-30 DIAGNOSIS — F90.2 ADHD (ATTENTION DEFICIT HYPERACTIVITY DISORDER), COMBINED TYPE: ICD-10-CM

## 2024-05-30 DIAGNOSIS — Z23 ENCOUNTER FOR IMMUNIZATION: ICD-10-CM

## 2024-05-30 DIAGNOSIS — F41.1 GENERALIZED ANXIETY DISORDER: ICD-10-CM

## 2024-05-30 DIAGNOSIS — F40.10 SOCIAL ANXIETY DISORDER: ICD-10-CM

## 2024-05-30 DIAGNOSIS — Z72.0 VAPES NICOTINE CONTAINING SUBSTANCE: ICD-10-CM

## 2024-05-30 DIAGNOSIS — Z00.00 WELLNESS EXAMINATION: Primary | ICD-10-CM

## 2024-05-30 DIAGNOSIS — E66.09 CLASS 1 OBESITY DUE TO EXCESS CALORIES WITH SERIOUS COMORBIDITY AND BODY MASS INDEX (BMI) OF 34.0 TO 34.9 IN ADULT: ICD-10-CM

## 2024-05-30 PROBLEM — K42.9 UMBILICAL HERNIA: Status: RESOLVED | Noted: 2022-11-18 | Resolved: 2024-05-30

## 2024-05-30 PROBLEM — K64.5 THROMBOSED EXTERNAL HEMORRHOID: Status: RESOLVED | Noted: 2023-05-02 | Resolved: 2024-05-30

## 2024-05-30 PROBLEM — K42.9 PERIUMBILICAL HERNIA: Status: RESOLVED | Noted: 2022-11-09 | Resolved: 2024-05-30

## 2024-05-30 PROCEDURE — 99214 OFFICE O/P EST MOD 30 MIN: CPT | Performed by: NURSE PRACTITIONER

## 2024-05-30 PROCEDURE — 90677 PCV20 VACCINE IM: CPT | Performed by: NURSE PRACTITIONER

## 2024-05-30 PROCEDURE — 90471 IMMUNIZATION ADMIN: CPT | Performed by: NURSE PRACTITIONER

## 2024-05-30 PROCEDURE — 99396 PREV VISIT EST AGE 40-64: CPT | Performed by: NURSE PRACTITIONER

## 2024-05-30 RX ORDER — METFORMIN HYDROCHLORIDE 500 MG/1
TABLET, EXTENDED RELEASE ORAL
Qty: 360 TABLET | Refills: 0 | Status: SHIPPED | OUTPATIENT
Start: 2024-05-30

## 2024-05-30 ASSESSMENT — ENCOUNTER SYMPTOMS
COUGH: 0
CONSTIPATION: 0
SHORTNESS OF BREATH: 0
TROUBLE SWALLOWING: 0
DIARRHEA: 0
WHEEZING: 0
ABDOMINAL PAIN: 0

## 2024-05-30 ASSESSMENT — PATIENT HEALTH QUESTIONNAIRE - PHQ9
SUM OF ALL RESPONSES TO PHQ QUESTIONS 1-9: 0
1. LITTLE INTEREST OR PLEASURE IN DOING THINGS: NOT AT ALL
2. FEELING DOWN, DEPRESSED OR HOPELESS: NOT AT ALL
SUM OF ALL RESPONSES TO PHQ QUESTIONS 1-9: 0
SUM OF ALL RESPONSES TO PHQ9 QUESTIONS 1 & 2: 0

## 2024-05-30 NOTE — PROGRESS NOTES
Chief Complaint   Patient presents with    Annual Exam    Follow-up     Chronic conditions     \"Have you been to the ER, urgent care clinic since your last visit?  Hospitalized since your last visit?\"    NO    “Have you seen or consulted any other health care providers outside of [facility] since your last visit?”    NO            Click Here for Release of Records Request   PHQ-9 Total Score: 0 (5/30/2024  1:48 PM)

## 2024-05-30 NOTE — PROGRESS NOTES
Subjective  Chief Complaint   Patient presents with    Annual Exam    Follow-up     Chronic conditions     HPI:  Bettina Biggs is a 43 y.o. female.  Presents for wellness, and management of ADHD, hyperlipidemia, anxiety, and obesity. Understands this is considered two appointments and there may be a copay for the chronic disease management portion of the visit.     For chronic disease management:  Medications reviewed, taking as prescribed with no known side effects. She is frustrated with difficulty finding Wegovy and Zepbound. Would like to discuss surgical options with bariatric surgeon. Uses Albuterol prn chest tightness attributed to anxiety.     ADHD management-  Current medication and dose: Vyvanse  Last dose taken: this morning  Taking daily: yes  Work Performance: no concerns  Appetite: normal, no binge eating, weight has increased off weight loss meds  Mood: stable, denies irritability and anger  Sleep: good, not tired at work  Denies tachycardia, palpitations, and blood pressure concerns.    For wellness:  Immunizations:  Flu: due this fall  COVID: Recommend   Tetanus: 4/2016  Prevnar 20: Recommend for history of vaping    HCV screen: Complete  HIV screen: Complete  LMP: hysterectomy  Pap: no longer indicated  Mammo: 1/2023  Smoking status: former cigarette smoker, current daily vape  Low dose CT scan: not indicated    Moods: at goal  PHQ2: 0/2  Diet: trying  Exercise: regular  Vision exams: overdue  Dental exams: overdue      Past Medical History:   Diagnosis Date    ADHD (attention deficit hyperactivity disorder)     Anxiety     Asthma     CHILDHOOD    Chronic back pain     Chronic pain     DISC ISSUES IN BACK    COVID-19 virus infection 12/2022    Dental disorder     Dyspepsia and other specified disorders of function of stomach     GERD (gastroesophageal reflux disease)     Menopause     PTSD (post-traumatic stress disorder)     Thrombosed external hemorrhoid 05/02/2023     Family History   Problem

## 2024-05-30 NOTE — TELEPHONE ENCOUNTER
[facility] and needs clarification on the metformin.  How long is patient to take the 1 tablet twice a day with meals before increasing it to the 2 tablets twice daily? Please advise. [phone]

## 2024-05-31 DIAGNOSIS — E55.9 VITAMIN D DEFICIENCY: Primary | ICD-10-CM

## 2024-05-31 LAB
25(OH)D3+25(OH)D2 SERPL-MCNC: 8.6 NG/ML (ref 30–100)
ALBUMIN SERPL-MCNC: 4.8 G/DL (ref 3.9–4.9)
ALBUMIN/GLOB SERPL: 2 {RATIO} (ref 1.2–2.2)
ALP SERPL-CCNC: 123 IU/L (ref 44–121)
ALT SERPL-CCNC: 16 IU/L (ref 0–32)
AST SERPL-CCNC: 20 IU/L (ref 0–40)
BASOPHILS # BLD AUTO: 0 X10E3/UL (ref 0–0.2)
BASOPHILS NFR BLD AUTO: 0 %
BILIRUB SERPL-MCNC: 0.2 MG/DL (ref 0–1.2)
BUN SERPL-MCNC: 10 MG/DL (ref 6–24)
BUN/CREAT SERPL: 12 (ref 9–23)
CALCIUM SERPL-MCNC: 9.6 MG/DL (ref 8.7–10.2)
CHLORIDE SERPL-SCNC: 103 MMOL/L (ref 96–106)
CHOLEST SERPL-MCNC: 297 MG/DL (ref 100–199)
CO2 SERPL-SCNC: 20 MMOL/L (ref 20–29)
CREAT SERPL-MCNC: 0.81 MG/DL (ref 0.57–1)
EGFRCR SERPLBLD CKD-EPI 2021: 92 ML/MIN/1.73
EOSINOPHIL # BLD AUTO: 0.1 X10E3/UL (ref 0–0.4)
EOSINOPHIL NFR BLD AUTO: 1 %
ERYTHROCYTE [DISTWIDTH] IN BLOOD BY AUTOMATED COUNT: 13.1 % (ref 11.7–15.4)
GLOBULIN SER CALC-MCNC: 2.4 G/DL (ref 1.5–4.5)
GLUCOSE SERPL-MCNC: 98 MG/DL (ref 70–99)
HBA1C MFR BLD: 5.4 % (ref 4.8–5.6)
HCT VFR BLD AUTO: 44.2 % (ref 34–46.6)
HDLC SERPL-MCNC: 63 MG/DL
HGB BLD-MCNC: 14.7 G/DL (ref 11.1–15.9)
IMM GRANULOCYTES # BLD AUTO: 0 X10E3/UL (ref 0–0.1)
IMM GRANULOCYTES NFR BLD AUTO: 0 %
LABORATORY COMMENT REPORT: ABNORMAL
LDLC SERPL CALC-MCNC: 208 MG/DL (ref 0–99)
LYMPHOCYTES # BLD AUTO: 2 X10E3/UL (ref 0.7–3.1)
LYMPHOCYTES NFR BLD AUTO: 23 %
MCH RBC QN AUTO: 30.8 PG (ref 26.6–33)
MCHC RBC AUTO-ENTMCNC: 33.3 G/DL (ref 31.5–35.7)
MCV RBC AUTO: 93 FL (ref 79–97)
MONOCYTES # BLD AUTO: 0.4 X10E3/UL (ref 0.1–0.9)
MONOCYTES NFR BLD AUTO: 5 %
NEUTROPHILS # BLD AUTO: 6.1 X10E3/UL (ref 1.4–7)
NEUTROPHILS NFR BLD AUTO: 71 %
PLATELET # BLD AUTO: 235 X10E3/UL (ref 150–450)
POTASSIUM SERPL-SCNC: 4.2 MMOL/L (ref 3.5–5.2)
PROT SERPL-MCNC: 7.2 G/DL (ref 6–8.5)
RBC # BLD AUTO: 4.77 X10E6/UL (ref 3.77–5.28)
SODIUM SERPL-SCNC: 141 MMOL/L (ref 134–144)
TRIGL SERPL-MCNC: 143 MG/DL (ref 0–149)
TSH SERPL DL<=0.005 MIU/L-ACNC: 2.14 UIU/ML (ref 0.45–4.5)
VLDLC SERPL CALC-MCNC: 26 MG/DL (ref 5–40)
VZV IGG SER IA-ACNC: 1756 INDEX
WBC # BLD AUTO: 8.7 X10E3/UL (ref 3.4–10.8)

## 2024-05-31 RX ORDER — ERGOCALCIFEROL 1.25 MG/1
CAPSULE ORAL
Qty: 8 CAPSULE | Refills: 0 | Status: SHIPPED | OUTPATIENT
Start: 2024-05-31

## 2024-05-31 RX ORDER — CHOLECALCIFEROL (VITAMIN D3) 125 MCG
5000 CAPSULE ORAL DAILY
Qty: 90 CAPSULE | Refills: 3 | Status: SHIPPED | OUTPATIENT
Start: 2024-05-31

## 2024-06-09 ENCOUNTER — PATIENT MESSAGE (OUTPATIENT)
Facility: CLINIC | Age: 44
End: 2024-06-09

## 2024-06-09 DIAGNOSIS — E66.09 CLASS 1 OBESITY DUE TO EXCESS CALORIES WITH SERIOUS COMORBIDITY AND BODY MASS INDEX (BMI) OF 34.0 TO 34.9 IN ADULT: Primary | ICD-10-CM

## 2024-06-10 RX ORDER — SEMAGLUTIDE 0.25 MG/.5ML
0.25 INJECTION, SOLUTION SUBCUTANEOUS
Qty: 2 ML | Refills: 0 | Status: SHIPPED | OUTPATIENT
Start: 2024-06-10

## 2024-06-10 NOTE — TELEPHONE ENCOUNTER
From: Bettina Biggs  To: Paola Jonse  Sent: 6/9/2024 7:12 PM EDT  Subject: Wegovy    I was wondering if you could resend the starter wegovy to [facility] so it can at least be on hold till they get it in. I noticed all the other ones were canceled so none are currently on hold. Thank you :-)

## 2024-06-20 NOTE — TELEPHONE ENCOUNTER
Insurance called on behalf of patient requesting that a PA is completed for patient. Needing clinical notes stating that at least 2 weight factors are needed for this medication to be approved.

## 2024-06-21 ENCOUNTER — PATIENT MESSAGE (OUTPATIENT)
Facility: CLINIC | Age: 44
End: 2024-06-21

## 2024-06-21 DIAGNOSIS — E66.09 CLASS 1 OBESITY DUE TO EXCESS CALORIES WITH SERIOUS COMORBIDITY AND BODY MASS INDEX (BMI) OF 34.0 TO 34.9 IN ADULT: ICD-10-CM

## 2024-06-21 NOTE — TELEPHONE ENCOUNTER
From: Bettina Biggs  To: Paola Jones  Sent: 6/21/2024 3:33 PM EDT  Subject: Wegovy    I was wondering if [facility]. I called and they said they are hit or miss. The [facility] in [place] [address]?  Not sure if you redid the prior authorization yet with the info they requested, so hopefully if they have it, the prior authorization will work this time. Thank you so much!

## 2024-06-23 RX ORDER — ATORVASTATIN CALCIUM 20 MG/1
TABLET, FILM COATED ORAL
Qty: 90 TABLET | Refills: 3 | Status: SHIPPED | OUTPATIENT
Start: 2024-06-23

## 2024-06-23 RX ORDER — SEMAGLUTIDE 0.25 MG/.5ML
0.25 INJECTION, SOLUTION SUBCUTANEOUS
Qty: 2 ML | Refills: 0 | Status: SHIPPED | OUTPATIENT
Start: 2024-06-23

## 2024-06-25 ENCOUNTER — PATIENT MESSAGE (OUTPATIENT)
Facility: CLINIC | Age: 44
End: 2024-06-25

## 2024-06-25 DIAGNOSIS — E55.9 VITAMIN D DEFICIENCY: ICD-10-CM

## 2024-06-25 DIAGNOSIS — E66.01 CLASS 2 SEVERE OBESITY DUE TO EXCESS CALORIES WITH SERIOUS COMORBIDITY AND BODY MASS INDEX (BMI) OF 36.0 TO 36.9 IN ADULT (HCC): Primary | ICD-10-CM

## 2024-06-25 DIAGNOSIS — F90.2 ADHD (ATTENTION DEFICIT HYPERACTIVITY DISORDER), COMBINED TYPE: ICD-10-CM

## 2024-06-25 RX ORDER — ACETAMINOPHEN 160 MG
TABLET,DISINTEGRATING ORAL
Qty: 90 CAPSULE | Refills: 3 | Status: SHIPPED | OUTPATIENT
Start: 2024-06-25

## 2024-06-25 RX ORDER — LISDEXAMFETAMINE DIMESYLATE 70 MG/1
CAPSULE ORAL
Qty: 30 CAPSULE | Refills: 0 | Status: SHIPPED | OUTPATIENT
Start: 2024-06-25 | End: 2024-07-05

## 2024-06-25 RX ORDER — ERGOCALCIFEROL 1.25 MG/1
CAPSULE ORAL
Qty: 8 CAPSULE | Refills: 0 | Status: CANCELLED | OUTPATIENT
Start: 2024-06-25

## 2024-06-25 NOTE — TELEPHONE ENCOUNTER
From: Bettina Biggs  To: Paola Jones  Sent: 6/25/2024 12:36 PM EDT  Subject: Wegovy PA    I was just wondering if you were able to do the PA for the wegovy at [facility] aid yet? They said they were just waiting on that. When I spoke to the insurance company they said to make sure the PA included to medical reasons for the medication I think.   Thank you! I really hope this time i will finally be able to get the med < 3

## 2024-06-25 NOTE — TELEPHONE ENCOUNTER
From: Bettina Biggs  To: Office of Paola Jones  Sent: 6/24/2024 2:33 PM EDT  Subject: Medication Renewal Request    Refills have been requested for the following medications:     lisdexamfetamine (VYVANSE) 70 MG capsule [Paola Jones, TINA - NP]     vitamin D (ERGOCALCIFEROL) 1.25 MG (31042 UT) CAPS capsule [TINA Chawla NP]    Preferred pharmacy: [id number] [place] [address] [address] [phone] - F [phone]

## 2024-07-16 ENCOUNTER — PATIENT MESSAGE (OUTPATIENT)
Facility: CLINIC | Age: 44
End: 2024-07-16

## 2024-07-16 DIAGNOSIS — E66.09 CLASS 1 OBESITY DUE TO EXCESS CALORIES WITH SERIOUS COMORBIDITY AND BODY MASS INDEX (BMI) OF 33.0 TO 33.9 IN ADULT: Primary | ICD-10-CM

## 2024-07-17 RX ORDER — TIRZEPATIDE 2.5 MG/.5ML
2.5 INJECTION, SOLUTION SUBCUTANEOUS WEEKLY
Qty: 2 ML | Refills: 0 | Status: SHIPPED | OUTPATIENT
Start: 2024-07-17

## 2024-07-17 NOTE — TELEPHONE ENCOUNTER
From: Bettina Biggs  To: Paola Jones  Sent: 7/16/2024 2:55 PM EDT  Subject: Appeal denied     I dont understand why they denied the appeal but they did. I was wondering if you could send in another zepbound script, while it is still approved, and hopefully I will be able to get next dose up. If that is possible?

## 2024-07-23 DIAGNOSIS — F90.2 ADHD (ATTENTION DEFICIT HYPERACTIVITY DISORDER), COMBINED TYPE: ICD-10-CM

## 2024-07-23 RX ORDER — HYDROXYZINE 50 MG/1
TABLET, FILM COATED ORAL
Qty: 10 TABLET | Refills: 5 | Status: SHIPPED | OUTPATIENT
Start: 2024-07-23

## 2024-07-23 RX ORDER — LISDEXAMFETAMINE DIMESYLATE 70 MG/1
CAPSULE ORAL
Qty: 30 CAPSULE | Refills: 0 | Status: SHIPPED | OUTPATIENT
Start: 2024-07-24 | End: 2024-08-02

## 2024-07-23 NOTE — TELEPHONE ENCOUNTER
From: Bettina Biggs  To: Office of Paola Jones  Sent: 7/22/2024 8:27 PM EDT  Subject: Medication Renewal Request    Refills have been requested for the following medications:     lisdexamfetamine (VYVANSE) 70 MG capsule [Paola Jones, APRN - NP]    Preferred pharmacy: [id number] [place] [address] [address]  MIGU[place] [phone] - F [phone]

## 2024-07-27 DIAGNOSIS — E55.9 VITAMIN D DEFICIENCY: ICD-10-CM

## 2024-07-28 RX ORDER — ERGOCALCIFEROL 1.25 MG/1
CAPSULE, LIQUID FILLED ORAL
Qty: 8 CAPSULE | Refills: 0 | OUTPATIENT
Start: 2024-07-28

## 2024-07-30 ENCOUNTER — PATIENT MESSAGE (OUTPATIENT)
Facility: CLINIC | Age: 44
End: 2024-07-30

## 2024-07-30 DIAGNOSIS — E66.09 CLASS 1 OBESITY DUE TO EXCESS CALORIES WITH SERIOUS COMORBIDITY AND BODY MASS INDEX (BMI) OF 33.0 TO 33.9 IN ADULT: Primary | ICD-10-CM

## 2024-07-31 RX ORDER — TIRZEPATIDE 5 MG/.5ML
5 INJECTION, SOLUTION SUBCUTANEOUS WEEKLY
Qty: 2 ML | Refills: 0 | Status: SHIPPED | OUTPATIENT
Start: 2024-07-31

## 2024-07-31 NOTE — TELEPHONE ENCOUNTER
From: Bettina Biggs  To: Paola Jones  Sent: 7/30/2024 5:58 PM EDT  Subject: 5mg zepbound     I was wondering if you could send script for next month[facility] to give them more time to be able to get it hopefully. Thank you!!

## 2024-08-20 ENCOUNTER — PATIENT MESSAGE (OUTPATIENT)
Facility: CLINIC | Age: 44
End: 2024-08-20

## 2024-08-20 DIAGNOSIS — F90.2 ADHD (ATTENTION DEFICIT HYPERACTIVITY DISORDER), COMBINED TYPE: ICD-10-CM

## 2024-08-20 RX ORDER — LISDEXAMFETAMINE DIMESYLATE 70 MG/1
CAPSULE ORAL
Qty: 30 CAPSULE | Refills: 0 | Status: SHIPPED | OUTPATIENT
Start: 2024-08-22 | End: 2024-08-29

## 2024-08-22 ENCOUNTER — PATIENT MESSAGE (OUTPATIENT)
Facility: CLINIC | Age: 44
End: 2024-08-22

## 2024-08-24 ENCOUNTER — PATIENT MESSAGE (OUTPATIENT)
Facility: CLINIC | Age: 44
End: 2024-08-24

## 2024-08-31 DIAGNOSIS — E55.9 VITAMIN D DEFICIENCY: ICD-10-CM

## 2024-09-18 RX ORDER — HYDROXYZINE HYDROCHLORIDE 50 MG/1
TABLET, FILM COATED ORAL
Qty: 10 TABLET | Refills: 5 | Status: SHIPPED | OUTPATIENT
Start: 2024-09-18

## 2024-09-19 ENCOUNTER — PATIENT MESSAGE (OUTPATIENT)
Facility: CLINIC | Age: 44
End: 2024-09-19

## 2024-09-19 DIAGNOSIS — F90.2 ADHD (ATTENTION DEFICIT HYPERACTIVITY DISORDER), COMBINED TYPE: ICD-10-CM

## 2024-09-19 DIAGNOSIS — E66.01 CLASS 2 SEVERE OBESITY DUE TO EXCESS CALORIES WITH SERIOUS COMORBIDITY AND BODY MASS INDEX (BMI) OF 36.0 TO 36.9 IN ADULT (HCC): Primary | ICD-10-CM

## 2024-09-20 RX ORDER — LISDEXAMFETAMINE DIMESYLATE 70 MG/1
CAPSULE ORAL
Qty: 30 CAPSULE | Refills: 0 | Status: SHIPPED | OUTPATIENT
Start: 2024-09-20 | End: 2024-09-26

## 2024-09-21 ENCOUNTER — PATIENT MESSAGE (OUTPATIENT)
Facility: CLINIC | Age: 44
End: 2024-09-21

## 2024-09-26 SDOH — ECONOMIC STABILITY: TRANSPORTATION INSECURITY
IN THE PAST 12 MONTHS, HAS LACK OF TRANSPORTATION KEPT YOU FROM MEETINGS, WORK, OR FROM GETTING THINGS NEEDED FOR DAILY LIVING?: NO

## 2024-09-26 SDOH — ECONOMIC STABILITY: INCOME INSECURITY: HOW HARD IS IT FOR YOU TO PAY FOR THE VERY BASICS LIKE FOOD, HOUSING, MEDICAL CARE, AND HEATING?: PATIENT DECLINED

## 2024-09-26 SDOH — ECONOMIC STABILITY: FOOD INSECURITY: WITHIN THE PAST 12 MONTHS, THE FOOD YOU BOUGHT JUST DIDN'T LAST AND YOU DIDN'T HAVE MONEY TO GET MORE.: PATIENT DECLINED

## 2024-09-26 SDOH — ECONOMIC STABILITY: FOOD INSECURITY: WITHIN THE PAST 12 MONTHS, YOU WORRIED THAT YOUR FOOD WOULD RUN OUT BEFORE YOU GOT MONEY TO BUY MORE.: PATIENT DECLINED

## 2024-09-27 ENCOUNTER — TELEMEDICINE (OUTPATIENT)
Facility: CLINIC | Age: 44
End: 2024-09-27
Payer: MEDICAID

## 2024-09-27 DIAGNOSIS — F90.2 ADHD (ATTENTION DEFICIT HYPERACTIVITY DISORDER), COMBINED TYPE: Primary | ICD-10-CM

## 2024-09-27 DIAGNOSIS — Z12.31 BREAST CANCER SCREENING BY MAMMOGRAM: ICD-10-CM

## 2024-09-27 DIAGNOSIS — Z23 ENCOUNTER FOR IMMUNIZATION: ICD-10-CM

## 2024-09-27 DIAGNOSIS — E66.01 CLASS 2 SEVERE OBESITY DUE TO EXCESS CALORIES WITH SERIOUS COMORBIDITY AND BODY MASS INDEX (BMI) OF 36.0 TO 36.9 IN ADULT: ICD-10-CM

## 2024-09-27 DIAGNOSIS — E66.09 CLASS 1 OBESITY DUE TO EXCESS CALORIES WITH SERIOUS COMORBIDITY AND BODY MASS INDEX (BMI) OF 30.0 TO 30.9 IN ADULT: ICD-10-CM

## 2024-09-27 PROCEDURE — 99214 OFFICE O/P EST MOD 30 MIN: CPT | Performed by: NURSE PRACTITIONER

## 2024-09-27 RX ORDER — CITALOPRAM HYDROBROMIDE 40 MG/1
TABLET ORAL
Qty: 90 TABLET | Refills: 3 | Status: SHIPPED | OUTPATIENT
Start: 2024-09-27

## 2024-09-27 ASSESSMENT — PATIENT HEALTH QUESTIONNAIRE - PHQ9
SUM OF ALL RESPONSES TO PHQ QUESTIONS 1-9: 0
SUM OF ALL RESPONSES TO PHQ9 QUESTIONS 1 & 2: 0
SUM OF ALL RESPONSES TO PHQ QUESTIONS 1-9: 0
SUM OF ALL RESPONSES TO PHQ QUESTIONS 1-9: 0
1. LITTLE INTEREST OR PLEASURE IN DOING THINGS: NOT AT ALL
SUM OF ALL RESPONSES TO PHQ QUESTIONS 1-9: 0
2. FEELING DOWN, DEPRESSED OR HOPELESS: NOT AT ALL

## 2024-10-09 ENCOUNTER — PATIENT MESSAGE (OUTPATIENT)
Facility: CLINIC | Age: 44
End: 2024-10-09

## 2024-10-09 DIAGNOSIS — E66.812 CLASS 2 SEVERE OBESITY DUE TO EXCESS CALORIES WITH SERIOUS COMORBIDITY AND BODY MASS INDEX (BMI) OF 36.0 TO 36.9 IN ADULT: ICD-10-CM

## 2024-10-09 DIAGNOSIS — E66.01 CLASS 2 SEVERE OBESITY DUE TO EXCESS CALORIES WITH SERIOUS COMORBIDITY AND BODY MASS INDEX (BMI) OF 36.0 TO 36.9 IN ADULT: ICD-10-CM

## 2024-10-10 RX ORDER — TIRZEPATIDE 12.5 MG/.5ML
12.5 INJECTION, SOLUTION SUBCUTANEOUS
Qty: 2 ML | Refills: 0 | Status: SHIPPED | OUTPATIENT
Start: 2024-10-10

## 2024-10-20 ENCOUNTER — PATIENT MESSAGE (OUTPATIENT)
Facility: CLINIC | Age: 44
End: 2024-10-20

## 2024-10-20 DIAGNOSIS — F90.2 ADHD (ATTENTION DEFICIT HYPERACTIVITY DISORDER), COMBINED TYPE: ICD-10-CM

## 2024-10-21 RX ORDER — LISDEXAMFETAMINE DIMESYLATE 70 MG/1
CAPSULE ORAL
Qty: 30 CAPSULE | Refills: 0 | Status: SHIPPED | OUTPATIENT
Start: 2024-10-21 | End: 2024-10-27

## 2024-11-02 ENCOUNTER — PATIENT MESSAGE (OUTPATIENT)
Facility: CLINIC | Age: 44
End: 2024-11-02

## 2024-11-02 DIAGNOSIS — E66.01 CLASS 2 SEVERE OBESITY DUE TO EXCESS CALORIES WITH SERIOUS COMORBIDITY AND BODY MASS INDEX (BMI) OF 36.0 TO 36.9 IN ADULT: ICD-10-CM

## 2024-11-02 DIAGNOSIS — E66.812 CLASS 2 SEVERE OBESITY DUE TO EXCESS CALORIES WITH SERIOUS COMORBIDITY AND BODY MASS INDEX (BMI) OF 36.0 TO 36.9 IN ADULT: ICD-10-CM

## 2024-11-04 RX ORDER — TIRZEPATIDE 15 MG/.5ML
15 INJECTION, SOLUTION SUBCUTANEOUS WEEKLY
Qty: 6 ML | Refills: 1 | Status: SHIPPED | OUTPATIENT
Start: 2024-11-04 | End: 2024-11-06 | Stop reason: SDUPTHER

## 2024-11-06 ENCOUNTER — PATIENT MESSAGE (OUTPATIENT)
Facility: CLINIC | Age: 44
End: 2024-11-06

## 2024-11-08 RX ORDER — TIRZEPATIDE 15 MG/.5ML
15 INJECTION, SOLUTION SUBCUTANEOUS WEEKLY
Qty: 6 ML | Refills: 1 | Status: SHIPPED | OUTPATIENT
Start: 2024-11-08

## 2024-11-15 RX ORDER — HYDROXYZINE HYDROCHLORIDE 50 MG/1
TABLET, FILM COATED ORAL
Qty: 10 TABLET | Refills: 5 | Status: SHIPPED | OUTPATIENT
Start: 2024-11-15

## 2024-11-18 ENCOUNTER — PATIENT MESSAGE (OUTPATIENT)
Facility: CLINIC | Age: 44
End: 2024-11-18

## 2024-11-18 DIAGNOSIS — F90.2 ADHD (ATTENTION DEFICIT HYPERACTIVITY DISORDER), COMBINED TYPE: ICD-10-CM

## 2024-11-19 RX ORDER — LISDEXAMFETAMINE DIMESYLATE 70 MG/1
CAPSULE ORAL
Qty: 30 CAPSULE | Refills: 0 | Status: SHIPPED | OUTPATIENT
Start: 2024-11-19 | End: 2024-11-24

## 2024-12-10 RX ORDER — TIRZEPATIDE 15 MG/.5ML
15 INJECTION, SOLUTION SUBCUTANEOUS WEEKLY
Qty: 6 ML | Refills: 0 | Status: SHIPPED | OUTPATIENT
Start: 2024-12-10

## 2024-12-17 ENCOUNTER — PATIENT MESSAGE (OUTPATIENT)
Facility: CLINIC | Age: 44
End: 2024-12-17

## 2024-12-17 DIAGNOSIS — F90.2 ADHD (ATTENTION DEFICIT HYPERACTIVITY DISORDER), COMBINED TYPE: ICD-10-CM

## 2024-12-18 RX ORDER — LISDEXAMFETAMINE DIMESYLATE 70 MG/1
CAPSULE ORAL
Qty: 30 CAPSULE | Refills: 0 | Status: SHIPPED | OUTPATIENT
Start: 2024-12-18 | End: 2024-12-22

## 2025-01-02 RX ORDER — HYDROXYZINE HYDROCHLORIDE 50 MG/1
TABLET, FILM COATED ORAL
Qty: 10 TABLET | Refills: 5 | Status: SHIPPED | OUTPATIENT
Start: 2025-01-02

## 2025-01-16 DIAGNOSIS — F90.2 ADHD (ATTENTION DEFICIT HYPERACTIVITY DISORDER), COMBINED TYPE: ICD-10-CM

## 2025-01-16 RX ORDER — LISDEXAMFETAMINE DIMESYLATE 70 MG/1
CAPSULE ORAL
Qty: 30 CAPSULE | Refills: 0 | Status: SHIPPED | OUTPATIENT
Start: 2025-01-17 | End: 2025-01-20

## 2025-01-20 SDOH — ECONOMIC STABILITY: TRANSPORTATION INSECURITY
IN THE PAST 12 MONTHS, HAS THE LACK OF TRANSPORTATION KEPT YOU FROM MEDICAL APPOINTMENTS OR FROM GETTING MEDICATIONS?: NO

## 2025-01-20 SDOH — ECONOMIC STABILITY: FOOD INSECURITY: WITHIN THE PAST 12 MONTHS, THE FOOD YOU BOUGHT JUST DIDN'T LAST AND YOU DIDN'T HAVE MONEY TO GET MORE.: NEVER TRUE

## 2025-01-20 SDOH — ECONOMIC STABILITY: FOOD INSECURITY: WITHIN THE PAST 12 MONTHS, YOU WORRIED THAT YOUR FOOD WOULD RUN OUT BEFORE YOU GOT MONEY TO BUY MORE.: NEVER TRUE

## 2025-01-20 SDOH — ECONOMIC STABILITY: INCOME INSECURITY: IN THE LAST 12 MONTHS, WAS THERE A TIME WHEN YOU WERE NOT ABLE TO PAY THE MORTGAGE OR RENT ON TIME?: NO

## 2025-01-22 ENCOUNTER — OFFICE VISIT (OUTPATIENT)
Facility: CLINIC | Age: 45
End: 2025-01-22
Payer: MEDICAID

## 2025-01-22 VITALS
TEMPERATURE: 97.8 F | HEIGHT: 65 IN | DIASTOLIC BLOOD PRESSURE: 84 MMHG | BODY MASS INDEX: 26.05 KG/M2 | SYSTOLIC BLOOD PRESSURE: 132 MMHG | WEIGHT: 156.38 LBS | HEART RATE: 99 BPM | OXYGEN SATURATION: 98 % | RESPIRATION RATE: 16 BRPM

## 2025-01-22 DIAGNOSIS — F90.2 ADHD (ATTENTION DEFICIT HYPERACTIVITY DISORDER), COMBINED TYPE: Primary | ICD-10-CM

## 2025-01-22 DIAGNOSIS — Z12.31 BREAST CANCER SCREENING BY MAMMOGRAM: ICD-10-CM

## 2025-01-22 DIAGNOSIS — F41.1 GENERALIZED ANXIETY DISORDER: ICD-10-CM

## 2025-01-22 DIAGNOSIS — Z28.20 VACCINE REFUSED BY PATIENT: ICD-10-CM

## 2025-01-22 DIAGNOSIS — H91.90 HEARING LOSS, UNSPECIFIED HEARING LOSS TYPE, UNSPECIFIED LATERALITY: ICD-10-CM

## 2025-01-22 DIAGNOSIS — E66.3 OVERWEIGHT WITH BODY MASS INDEX (BMI) OF 26 TO 26.9 IN ADULT: ICD-10-CM

## 2025-01-22 PROCEDURE — 99214 OFFICE O/P EST MOD 30 MIN: CPT | Performed by: NURSE PRACTITIONER

## 2025-01-22 ASSESSMENT — PATIENT HEALTH QUESTIONNAIRE - PHQ9
SUM OF ALL RESPONSES TO PHQ QUESTIONS 1-9: 0
SUM OF ALL RESPONSES TO PHQ9 QUESTIONS 1 & 2: 0
SUM OF ALL RESPONSES TO PHQ QUESTIONS 1-9: 0
1. LITTLE INTEREST OR PLEASURE IN DOING THINGS: NOT AT ALL
SUM OF ALL RESPONSES TO PHQ QUESTIONS 1-9: 0
2. FEELING DOWN, DEPRESSED OR HOPELESS: NOT AT ALL
SUM OF ALL RESPONSES TO PHQ QUESTIONS 1-9: 0

## 2025-01-22 NOTE — ASSESSMENT & PLAN NOTE
Chronic and stable.  Declines dose adjustment.  Continue citalopram daily and hydroxyzine as needed.

## 2025-01-22 NOTE — PROGRESS NOTES
Chief Complaint   Patient presents with    Follow-up     ADHD     \"Have you been to the ER, urgent care clinic since your last visit?  Hospitalized since your last visit?\"    NO    “Have you seen or consulted any other health care providers outside of [facility] since your last visit?”    NO            Click Here for Release of Records Request   PHQ-9 Total Score: 0 (1/22/2025  1:58 PM)

## 2025-01-22 NOTE — PROGRESS NOTES
Subjective  Chief Complaint   Patient presents with    Follow-up     ADHD,left ear issue     HPI:  Bettina Biggs is a 44 y.o. female.  Presents for management of ADHD, anxiety, and obesity.  She also has an acute complaint.    Anxiety- varies but overall well controlled. Hydroxyzine prn, typically 5 days per week. Declines medication adjustment.   Obesity- weight is down 51 pounds on home scale. Tolerates Zepbound. She remains compliant with diet. She is doing resistance training 2-3 times per week. She is very active walking at work, 10,000-12,000 steps per day.    ADHD management-  Current medication and dose: Vyvanse 70mg daily  Last dose taken: this morning  Clear improvement in attention with medication: yes  Taking daily: yes  Work/school Performance: reasonable, stable  Organization: stable  Appetite: normal, decreased appetite on Zepbound  Mood: stable, denies irritability and anger  Sleep: good, not tired at work  Denies tachycardia, palpitations, and blood pressure concerns.    Reports left ear throbbing/pulsing sound and decreased hearing for the past several months. This is most noticeable when laying on left side. Reports intermittent tinnitus. Concerned for pulsatile tinnitus and hearing loss.        Past Medical History:   Diagnosis Date    ADHD (attention deficit hyperactivity disorder)     Anxiety     Asthma     CHILDHOOD    Chronic back pain     Chronic pain     DISC ISSUES IN BACK    COVID-19 virus infection 12/2022    Dental disorder     Dyspepsia and other specified disorders of function of stomach     GERD (gastroesophageal reflux disease)     Menopause     PTSD (post-traumatic stress disorder)     Thrombosed external hemorrhoid 05/02/2023     Family History   Problem Relation Age of Onset    COPD Mother     Anxiety Disorder Mother     Hypertension Mother     Osteoarthritis Mother     Stroke Maternal Grandfather     Cancer Brother     Anesth Problems Neg Hx     Heart Disease Maternal Grandfather

## 2025-01-22 NOTE — ASSESSMENT & PLAN NOTE
Chronic and stable.  We discussed the long-term risks of stimulant use.  She is agreeable to decrease dose to 60 mg with the next refill.

## 2025-02-05 RX ORDER — TIRZEPATIDE 15 MG/.5ML
15 INJECTION, SOLUTION SUBCUTANEOUS WEEKLY
Qty: 6 ML | Refills: 0 | Status: SHIPPED | OUTPATIENT
Start: 2025-02-05

## 2025-02-14 DIAGNOSIS — F90.2 ADHD (ATTENTION DEFICIT HYPERACTIVITY DISORDER), COMBINED TYPE: ICD-10-CM

## 2025-02-14 NOTE — TELEPHONE ENCOUNTER
5/2/2025  1:00 PM [facility] OFFICE VISIT [facility] Paola Jones, TINA - NP    Appointment Notes:   Return in about 4 months (around 5/22/2025) for f/u chronic conditions, nonfasting, VV

## 2025-02-16 DIAGNOSIS — F90.2 ADHD (ATTENTION DEFICIT HYPERACTIVITY DISORDER), COMBINED TYPE: ICD-10-CM

## 2025-02-17 RX ORDER — LISDEXAMFETAMINE DIMESYLATE 70 MG/1
CAPSULE ORAL
Qty: 30 CAPSULE | OUTPATIENT
Start: 2025-02-17

## 2025-02-17 RX ORDER — LISDEXAMFETAMINE DIMESYLATE 60 MG/1
60 CAPSULE ORAL DAILY
Qty: 30 CAPSULE | Refills: 0 | Status: SHIPPED | OUTPATIENT
Start: 2025-02-17 | End: 2025-03-19

## 2025-02-17 RX ORDER — LISDEXAMFETAMINE DIMESYLATE 70 MG/1
CAPSULE ORAL
Qty: 30 CAPSULE | Refills: 0 | OUTPATIENT
Start: 2025-02-17 | End: 2025-02-17

## 2025-02-21 RX ORDER — HYDROXYZINE HYDROCHLORIDE 50 MG/1
TABLET, FILM COATED ORAL
Qty: 10 TABLET | Refills: 5 | Status: SHIPPED | OUTPATIENT
Start: 2025-02-21

## 2025-02-25 ENCOUNTER — HOSPITAL ENCOUNTER (OUTPATIENT)
Facility: HOSPITAL | Age: 45
Discharge: HOME | End: 2025-02-28
Payer: MEDICAID

## 2025-02-25 DIAGNOSIS — Z12.31 SCREENING MAMMOGRAM FOR BREAST CANCER: ICD-10-CM

## 2025-02-25 PROCEDURE — 77063 BREAST TOMOSYNTHESIS BI: CPT

## 2025-03-04 ENCOUNTER — OFFICE VISIT (OUTPATIENT)
Facility: CLINIC | Age: 45
End: 2025-03-04
Payer: MEDICAID

## 2025-03-04 VITALS
HEART RATE: 100 BPM | SYSTOLIC BLOOD PRESSURE: 132 MMHG | BODY MASS INDEX: 24.68 KG/M2 | OXYGEN SATURATION: 98 % | TEMPERATURE: 97.5 F | RESPIRATION RATE: 16 BRPM | HEIGHT: 65 IN | WEIGHT: 148.13 LBS | DIASTOLIC BLOOD PRESSURE: 84 MMHG

## 2025-03-04 DIAGNOSIS — J20.9 ACUTE BRONCHITIS, UNSPECIFIED ORGANISM: Primary | ICD-10-CM

## 2025-03-04 DIAGNOSIS — R59.0 CERVICAL LYMPHADENOPATHY: ICD-10-CM

## 2025-03-04 PROCEDURE — 99214 OFFICE O/P EST MOD 30 MIN: CPT | Performed by: NURSE PRACTITIONER

## 2025-03-04 RX ORDER — PREDNISONE 20 MG/1
40 TABLET ORAL DAILY
Qty: 10 TABLET | Refills: 0 | Status: SHIPPED | OUTPATIENT
Start: 2025-03-04 | End: 2025-03-09

## 2025-03-04 ASSESSMENT — PATIENT HEALTH QUESTIONNAIRE - PHQ9
SUM OF ALL RESPONSES TO PHQ QUESTIONS 1-9: 0
SUM OF ALL RESPONSES TO PHQ QUESTIONS 1-9: 0
1. LITTLE INTEREST OR PLEASURE IN DOING THINGS: NOT AT ALL
SUM OF ALL RESPONSES TO PHQ QUESTIONS 1-9: 0
2. FEELING DOWN, DEPRESSED OR HOPELESS: NOT AT ALL
SUM OF ALL RESPONSES TO PHQ QUESTIONS 1-9: 0

## 2025-03-04 NOTE — PROGRESS NOTES
Chief Complaint   Patient presents with    Other     Lump on right side of neck, coughing up phlegm     \"Have you been to the ER, urgent care clinic since your last visit?  Hospitalized since your last visit?\"    NO    “Have you seen or consulted any other health care providers outside of [facility] since your last visit?”    NO            Click Here for Release of Records Request   PHQ-9 Total Score: 0 (3/4/2025  2:25 PM)         
present.      Mental Status: She is alert and oriented to person, place, and time.   Psychiatric:         Mood and Affect: Mood normal.         Behavior: Behavior normal.         Thought Content: Thought content normal.         Judgment: Judgment normal.           Assessment & Plan    Assessment & Plan  Acute bronchitis, unspecified organism     Medication as ordered.  Bronchitis is a viral illness and antibiotics are not indicated. Cough can persist for up to 21 days on average.  Recommend rest, hydration, and over-the-counter cough and cold medication as ordered.    Orders:    predniSONE (DELTASONE) 20 MG tablet; Take 2 tablets by mouth daily for 5 days    Cervical lymphadenopathy     Reassured enlarged lymph nodes are common with viral illness. Apply warm compresses as needed. Call if lymph node does not resolve with resolution of acute symptoms.            Aspects of this note may have been generated using voice recognition software. Despite editing, there may be some syntax errors.    Follow-up and Dispositions    Return if symptoms worsen or fail to improve, for as scheduled.         I have discussed the diagnosis with the patient and the intended plan as seen in the above orders. The patient has received an after-visit summary and questions were answered concerning future plans.  I have discussed medication side effects and warnings with the patient as well.    Paola Jones, TINA - NP

## 2025-03-11 RX ORDER — TIRZEPATIDE 15 MG/.5ML
15 INJECTION, SOLUTION SUBCUTANEOUS WEEKLY
Qty: 9 ML | Refills: 0 | Status: SHIPPED | OUTPATIENT
Start: 2025-03-11

## 2025-03-16 ENCOUNTER — PATIENT MESSAGE (OUTPATIENT)
Facility: CLINIC | Age: 45
End: 2025-03-16

## 2025-03-16 DIAGNOSIS — F90.2 ADHD (ATTENTION DEFICIT HYPERACTIVITY DISORDER), COMBINED TYPE: Primary | ICD-10-CM

## 2025-03-18 ENCOUNTER — OFFICE VISIT (OUTPATIENT)
Age: 45
End: 2025-03-18
Payer: MEDICAID

## 2025-03-18 ENCOUNTER — PROCEDURE VISIT (OUTPATIENT)
Age: 45
End: 2025-03-18
Payer: MEDICAID

## 2025-03-18 VITALS
WEIGHT: 146 LBS | BODY MASS INDEX: 24.32 KG/M2 | SYSTOLIC BLOOD PRESSURE: 118 MMHG | OXYGEN SATURATION: 97 % | HEIGHT: 65 IN | DIASTOLIC BLOOD PRESSURE: 78 MMHG | HEART RATE: 102 BPM

## 2025-03-18 DIAGNOSIS — H93.A2 PULSATILE TINNITUS OF LEFT EAR: Primary | ICD-10-CM

## 2025-03-18 DIAGNOSIS — H93.12 TINNITUS AURIUM, LEFT: Primary | ICD-10-CM

## 2025-03-18 PROCEDURE — 99203 OFFICE O/P NEW LOW 30 MIN: CPT | Performed by: OTOLARYNGOLOGY

## 2025-03-18 PROCEDURE — 92567 TYMPANOMETRY: CPT

## 2025-03-18 PROCEDURE — 92557 COMPREHENSIVE HEARING TEST: CPT

## 2025-03-18 RX ORDER — LISDEXAMFETAMINE DIMESYLATE 70 MG/1
70 CAPSULE ORAL DAILY
Qty: 30 CAPSULE | Refills: 0 | Status: SHIPPED | OUTPATIENT
Start: 2025-03-18 | End: 2025-04-17

## 2025-03-18 NOTE — PROGRESS NOTES
Otolaryngology-Head and Neck Surgery  New Patient Visit     Patient: Bettina Biggs  YOB: 1980  MRN: [id number]  Date of Service: 3/18/2025    Chief Complaint:   Chief Complaint   Patient presents with    New Patient    Hearing Problem     Hearing loss, unspecified hearing loss type, unspecified laterality         History of Present Illness: Bettina Biggs is a 44 y.o. female who presents today for discussion of her ears    In the last several months has developed left pulsatile tinnitus and perhaps reduced hearing    No otalgia, otorrhea    No prior ear surgery.     Does have some anxiety which then worsens tinnitus     Past Medical History:  Past Medical History:   Diagnosis Date    ADHD (attention deficit hyperactivity disorder)     Anxiety     Asthma     CHILDHOOD    Chronic back pain     Chronic pain     DISC ISSUES IN BACK    COVID-19 virus infection 12/2022    Dental disease     Dental disorder     Dyspepsia and other specified disorders of function of stomach     GERD (gastroesophageal reflux disease)     Headache     Menopause     PTSD (post-traumatic stress disorder)     Thrombosed external hemorrhoid 05/02/2023       Past Surgical History:   Past Surgical History:   Procedure Laterality Date    BREAST REDUCTION SURGERY  2002    CHOLECYSTECTOMY      CYST REMOVAL Right 05/2013    DILATION AND CURETTAGE OF UTERUS      HEMORRHOID SURGERY      HERNIA REPAIR  11/18/2022    Umbilical Hernia     HYSTERECTOMY (CERVIX STATUS UNKNOWN)  2015    HYSTERECTOMY, TOTAL ABDOMINAL (CERVIX REMOVED)      TONSILLECTOMY AND ADENOIDECTOMY      TUBAL LIGATION  2021    WISDOM TOOTH EXTRACTION         Medications:   Current Outpatient Medications   Medication Instructions    acetaminophen (TYLENOL) 500 mg, Oral, EVERY 6 HOURS PRN    albuterol sulfate HFA (PROVENTIL;VENTOLIN;PROAIR) 108 (90 Base) MCG/ACT inhaler inhale 2 puffs by mouth and INTO THE LUNGS every 4 hours if needed for wheezing    atorvastatin (LIPITOR)

## 2025-03-18 NOTE — PATIENT INSTRUCTIONS
the word \"Thursday\" but not the rest of the week, you may ask \"What was that about Thursday?\" or \"What did you want to do Thursday?\".  This shows the person talking that you are listening and will help them better explain what they are saying.  Be an advocate for yourself.  If you are hearing but not understanding, tell the other person \"I can hear you, but I need you to slow down when you speak.\"  Or if someone is facing the other direction, say \"I cannot hear you when you are not looking at me when we talk.\"       Tips as a Talker:   - Sit or stand 3 to 6 feet away to maximize audibility         -- It is unrealistic to believe someone else will fully hear your message if you are speaking from across the room or in a different room in the house   - Stay at eye level to help with visual cues   - Make sure you have the person’s attention before speaking   - Use facial expressions and gestures to accentuate your message   - Raise your voice slightly (do not scream)   - Speak slowly and distinctly   - Use short, simple sentences   - Rephrase your words if the person is having a hard time understanding you    - To avoid distortion, don’t speak directly into a person’s ear      Some additional items that may be helpful:   - Remain patient - this is important for both parties   - Write down items that still cannot be heard/understood.  You may write with pen/paper or consider typing/texting on a cell phone or smart device.   - If background noise is unavoidable, try to keep yourself in a good position in the room.  By sitting at a rivas on the side of the restaurant (preferably a corner), it will be easier to communicate than if you were sitting at a table in the middle with background noise surrounding you.  Keep yourself positioned away from music speakers or heavy foot traffic.

## 2025-03-18 NOTE — PROGRESS NOTES
Bettina Biggs   1980, 44 y.o. female   [id number]       Referring Provider: Ladonna Aguilar MD    ADULT AUDIOLOGIC EVALUATION      Bettina Biggs is a 44 y.o. female seen today, 3/18/2025 , for an audiologic evaluation.  Patient was seen by otolaryngology following today's evaluation.    AUDIOLOGIC AND OTHER PERTINENT MEDICAL HISTORY:      Bettina Biggs reports left pulsatile tinnitus. The tinnitus began approximately 6 months ago with no clear trigger. It is intermittent, typically occurring several times per week. It can onset randomly or after laying on her left ear. She notes that masking it out helps her sleep.    She denied otalgia, aural fullness, otorrhea, history of noise exposure, and family history of hearing loss    Date: 3/18/2025     IMPRESSIONS:      Today's results revealed Normal hearing 250-8000 Hz bilaterally. Excellent speech understanding when in quiet. Tympanometry indicates normal middle ear function.. Discussed test results and implications with patient. Discussed use of tinnitus management strategies. Discussed noise exposure and the importance of appropriate hearing protection when in hazardous noise environments.    Follow medical recommendations of primary care physician and referring provider.     ASSESSMENT AND FINDINGS:     Otoscopy unremarkable.    RIGHT EAR:  Hearing Sensitivity: Normal hearing sensitivity 250-8000 Hz  Speech Recognition Threshold: 10 dB HL  Word Recognition: Excellent 100%, based on NU-6 by-difficulty list at 50 dB HL using recorded speech stimuli.    Tympanometry: Normal peak pressure and compliance, Type A tympanogram, consistent with normal middle ear function.       LEFT EAR:  Hearing Sensitivity: Normal hearing sensitivity 250-8000 Hz  Speech Recognition Threshold: 10 dB HL  Word Recognition: Excellent 100%, based on NU-6 by-difficulty list at 50 dB HL using recorded speech stimuli.    Tympanometry: Normal peak pressure and compliance, Type A tympanogram,

## 2025-03-31 RX ORDER — TIRZEPATIDE 15 MG/.5ML
15 INJECTION, SOLUTION SUBCUTANEOUS WEEKLY
Qty: 9 ML | Refills: 0 | Status: SHIPPED | OUTPATIENT
Start: 2025-03-31

## 2025-04-04 RX ORDER — HYDROXYZINE HYDROCHLORIDE 50 MG/1
50 TABLET, FILM COATED ORAL 3 TIMES DAILY PRN
Qty: 10 TABLET | Refills: 5 | Status: SHIPPED | OUTPATIENT
Start: 2025-04-04

## 2025-04-14 DIAGNOSIS — F90.2 ADHD (ATTENTION DEFICIT HYPERACTIVITY DISORDER), COMBINED TYPE: ICD-10-CM

## 2025-04-14 RX ORDER — LISDEXAMFETAMINE DIMESYLATE 70 MG/1
70 CAPSULE ORAL DAILY
Qty: 30 CAPSULE | Refills: 0 | Status: SHIPPED | OUTPATIENT
Start: 2025-04-15 | End: 2025-05-15

## 2025-05-02 ENCOUNTER — TELEMEDICINE (OUTPATIENT)
Facility: CLINIC | Age: 45
End: 2025-05-02
Payer: MEDICAID

## 2025-05-02 DIAGNOSIS — Z72.0 VAPES NICOTINE CONTAINING SUBSTANCE: ICD-10-CM

## 2025-05-02 DIAGNOSIS — E66.812 CLASS 2 SEVERE OBESITY DUE TO EXCESS CALORIES WITH SERIOUS COMORBIDITY AND BODY MASS INDEX (BMI) OF 36.0 TO 36.9 IN ADULT (HCC): ICD-10-CM

## 2025-05-02 DIAGNOSIS — F41.1 GENERALIZED ANXIETY DISORDER: ICD-10-CM

## 2025-05-02 DIAGNOSIS — E66.01 CLASS 2 SEVERE OBESITY DUE TO EXCESS CALORIES WITH SERIOUS COMORBIDITY AND BODY MASS INDEX (BMI) OF 36.0 TO 36.9 IN ADULT (HCC): ICD-10-CM

## 2025-05-02 DIAGNOSIS — F90.2 ADHD (ATTENTION DEFICIT HYPERACTIVITY DISORDER), COMBINED TYPE: Primary | ICD-10-CM

## 2025-05-02 PROCEDURE — 99214 OFFICE O/P EST MOD 30 MIN: CPT | Performed by: NURSE PRACTITIONER

## 2025-05-02 ASSESSMENT — PATIENT HEALTH QUESTIONNAIRE - PHQ9
SUM OF ALL RESPONSES TO PHQ QUESTIONS 1-9: 0
1. LITTLE INTEREST OR PLEASURE IN DOING THINGS: NOT AT ALL
2. FEELING DOWN, DEPRESSED OR HOPELESS: NOT AT ALL
SUM OF ALL RESPONSES TO PHQ QUESTIONS 1-9: 0

## 2025-05-02 NOTE — PROGRESS NOTES
Chief Complaint   Patient presents with    Follow-up     \"Have you been to the ER, urgent care clinic since your last visit?  Hospitalized since your last visit?\"    NO    “Have you seen or consulted any other health care providers outside of [facility] since your last visit?”    NO            Click Here for Release of Records Request   PHQ-9 Total Score: 0 (5/2/2025  1:01 PM)

## 2025-05-02 NOTE — PROGRESS NOTES
Chief Complaint   Patient presents with    Follow-up         Subjective   History of Present Illness  The patient presents via virtual visit for follow-up on ADHD, anxiety, and weight management.    She continues her daily regimen of Vyvanse without any interruptions, including weekends and holidays. A significant enhancement in her attention span is reported when on the medication. Work performance remains satisfactory, and she feels organized. No mood fluctuations such as irritability or anger are experienced. Her sleep pattern is stable, with no disturbances reported due to Vyvanse. No episodes of tachycardia, palpitations, or blood pressure irregularities are reported.    Currently on Zepbound, her weight is approximately 141 pounds. Although the weight loss has decelerated, it continues at a gradual pace. Her target weight is 130 pounds, a slight increase from her lifelong weight range of 115 to 120 pounds. No engagement in binge eating or anorexic behaviors is reported. Appetite remains consistent, and no adverse effects from Zepbound are noted. The frequency of nausea has significantly reduced.    A daily dose of Celexa is taken, and hydroxyzine is used as needed for anxiety management. Hydroxyzine provides relief from anxiety, particularly the sensation of a heartbeat in her ear, which is distressing. The use of hydroxyzine is infrequent during the day but is often taken before bedtime.    She is scheduled for a surgical procedure on 06/05/2025, which involves the shaving of the mandibular danni and extraction of the remaining teeth. This will necessitate cessation of smoking and vaping.         Results      Prior to Admission medications    Medication Sig Start Date End Date Taking? Authorizing Provider   lisdexamfetamine (VYVANSE) 70 MG capsule Take 1 capsule by mouth daily for 30 days. Max Daily Amount: 70 mg 4/15/25 5/15/25 Yes Paola Jones, APRN - NP   hydrOXYzine HCl (ATARAX) 50 MG tablet

## 2025-05-12 ENCOUNTER — PATIENT MESSAGE (OUTPATIENT)
Facility: CLINIC | Age: 45
End: 2025-05-12

## 2025-05-12 DIAGNOSIS — F90.2 ADHD (ATTENTION DEFICIT HYPERACTIVITY DISORDER), COMBINED TYPE: ICD-10-CM

## 2025-05-12 NOTE — TELEPHONE ENCOUNTER
Future Appointments  5/12/2025 - 5/12/2027   Date Visit Type Department Provider    9/24/2025 11:00 AM PHYSICAL [facility] Paola Jones, TINA - NP   Appointment Notes:   Physical

## 2025-05-13 RX ORDER — TIRZEPATIDE 15 MG/.5ML
15 INJECTION, SOLUTION SUBCUTANEOUS WEEKLY
Qty: 9 ML | Refills: 0 | Status: SHIPPED | OUTPATIENT
Start: 2025-05-13 | End: 2025-05-13 | Stop reason: SDUPTHER

## 2025-05-13 RX ORDER — LISDEXAMFETAMINE DIMESYLATE 70 MG/1
70 CAPSULE ORAL DAILY
Qty: 30 CAPSULE | Refills: 0 | Status: SHIPPED | OUTPATIENT
Start: 2025-05-13 | End: 2025-06-12

## 2025-05-13 RX ORDER — TIRZEPATIDE 15 MG/.5ML
15 INJECTION, SOLUTION SUBCUTANEOUS WEEKLY
Qty: 9 ML | Refills: 0 | Status: SHIPPED | OUTPATIENT
Start: 2025-05-13

## 2025-05-27 RX ORDER — HYDROXYZINE HYDROCHLORIDE 50 MG/1
50 TABLET, FILM COATED ORAL 3 TIMES DAILY PRN
Qty: 10 TABLET | Refills: 5 | Status: SHIPPED | OUTPATIENT
Start: 2025-05-27

## 2025-06-04 RX ORDER — ATORVASTATIN CALCIUM 20 MG/1
20 TABLET, FILM COATED ORAL DAILY
Qty: 90 TABLET | Refills: 3 | Status: SHIPPED | OUTPATIENT
Start: 2025-06-04

## 2025-06-10 DIAGNOSIS — F90.2 ADHD (ATTENTION DEFICIT HYPERACTIVITY DISORDER), COMBINED TYPE: ICD-10-CM

## 2025-06-11 RX ORDER — LISDEXAMFETAMINE DIMESYLATE 70 MG/1
70 CAPSULE ORAL DAILY
Qty: 30 CAPSULE | Refills: 0 | Status: SHIPPED | OUTPATIENT
Start: 2025-06-11 | End: 2025-07-11

## 2025-06-19 DIAGNOSIS — E55.9 VITAMIN D DEFICIENCY: ICD-10-CM

## 2025-06-20 RX ORDER — ACETAMINOPHEN 160 MG
TABLET,DISINTEGRATING ORAL
Qty: 90 CAPSULE | Refills: 3 | Status: SHIPPED | OUTPATIENT
Start: 2025-06-20

## 2025-06-26 DIAGNOSIS — E55.9 VITAMIN D DEFICIENCY: ICD-10-CM

## 2025-06-26 RX ORDER — ACETAMINOPHEN 160 MG
TABLET,DISINTEGRATING ORAL
Qty: 90 CAPSULE | Refills: 3 | OUTPATIENT
Start: 2025-06-26

## 2025-07-07 ENCOUNTER — PATIENT MESSAGE (OUTPATIENT)
Facility: CLINIC | Age: 45
End: 2025-07-07

## 2025-07-07 DIAGNOSIS — F90.2 ADHD (ATTENTION DEFICIT HYPERACTIVITY DISORDER), COMBINED TYPE: ICD-10-CM

## 2025-07-08 RX ORDER — LISDEXAMFETAMINE DIMESYLATE 70 MG/1
70 CAPSULE ORAL DAILY
Qty: 30 CAPSULE | Refills: 0 | Status: SHIPPED | OUTPATIENT
Start: 2025-07-09 | End: 2025-08-08

## 2025-07-23 RX ORDER — HYDROXYZINE HYDROCHLORIDE 50 MG/1
50 TABLET, FILM COATED ORAL 3 TIMES DAILY PRN
Qty: 10 TABLET | Refills: 0 | Status: SHIPPED | OUTPATIENT
Start: 2025-07-23

## 2025-07-23 RX ORDER — CITALOPRAM HYDROBROMIDE 40 MG/1
40 TABLET ORAL EVERY MORNING
Qty: 90 TABLET | Refills: 3 | Status: SHIPPED | OUTPATIENT
Start: 2025-07-23

## 2025-08-08 DIAGNOSIS — F90.2 ADHD (ATTENTION DEFICIT HYPERACTIVITY DISORDER), COMBINED TYPE: ICD-10-CM

## 2025-08-08 RX ORDER — LISDEXAMFETAMINE DIMESYLATE 70 MG/1
70 CAPSULE ORAL DAILY
Qty: 30 CAPSULE | Refills: 0 | Status: SHIPPED | OUTPATIENT
Start: 2025-08-08 | End: 2025-09-07

## 2025-08-12 ENCOUNTER — PATIENT MESSAGE (OUTPATIENT)
Facility: CLINIC | Age: 45
End: 2025-08-12

## 2025-08-12 DIAGNOSIS — F90.2 ADHD (ATTENTION DEFICIT HYPERACTIVITY DISORDER), COMBINED TYPE: ICD-10-CM

## 2025-08-13 RX ORDER — LISDEXAMFETAMINE DIMESYLATE 70 MG/1
70 CAPSULE ORAL DAILY
Qty: 30 CAPSULE | Refills: 0 | Status: SHIPPED | OUTPATIENT
Start: 2025-08-13 | End: 2025-09-12

## 2025-08-14 ENCOUNTER — PATIENT MESSAGE (OUTPATIENT)
Facility: CLINIC | Age: 45
End: 2025-08-14

## 2025-08-14 DIAGNOSIS — F41.1 GENERALIZED ANXIETY DISORDER: Primary | ICD-10-CM

## 2025-08-15 RX ORDER — HYDROXYZINE HYDROCHLORIDE 50 MG/1
50 TABLET, FILM COATED ORAL 3 TIMES DAILY PRN
Qty: 10 TABLET | Refills: 0 | Status: SHIPPED | OUTPATIENT
Start: 2025-08-15

## (undated) DEVICE — GOWN,NON-REINFORCED,XXL: Brand: MEDLINE

## (undated) DEVICE — GLOVE SURG SZ 75 L12IN FNGR THK79MIL GRN LTX FREE

## (undated) DEVICE — INTENDED FOR TISSUE SEPARATION, AND OTHER PROCEDURES THAT REQUIRE A SHARP SURGICAL BLADE TO PUNCTURE OR CUT.: Brand: BARD-PARKER ® CARBON RIB-BACK BLADES

## (undated) DEVICE — STERILE POLYISOPRENE POWDER-FREE SURGICAL GLOVES: Brand: PROTEXIS

## (undated) DEVICE — INFECTION CONTROL KIT SYS

## (undated) DEVICE — DERMABOND SKIN ADH 0.7ML -- DERMABOND ADVANCED 12/BX

## (undated) DEVICE — (D)SYR 10ML 1/5ML GRAD NSAF -- PKGING CHANGE USE ITEM 338027

## (undated) DEVICE — SUT MONOCRYL PLUS UD 4-0 --

## (undated) DEVICE — ULNAR NERVE PROTECTOR FOAM POSITIONER: Brand: CARDINAL HEALTH

## (undated) DEVICE — TOWEL SURG W17XL27IN STD BLU COT NONFENESTRATED PREWASHED

## (undated) DEVICE — DEVON™ KNEE AND BODY STRAP 60" X 3" (1.5 M X 7.6 CM): Brand: DEVON

## (undated) DEVICE — DRESSING TRNSPAR W4XL4.8IN FLM SURESITE 123

## (undated) DEVICE — ROCKER SWITCH PENCIL BLADE ELECTRODE, HOLSTER: Brand: EDGE

## (undated) DEVICE — GARMENT,MEDLINE,DVT,INT,CALF,MED, GEN2: Brand: MEDLINE

## (undated) DEVICE — SOUTHSIDE TURNOVER: Brand: MEDLINE INDUSTRIES, INC.

## (undated) DEVICE — HYPODERMIC SAFETY NEEDLE: Brand: MONOJECT

## (undated) DEVICE — PREP SKN CHLRAPRP APL 26ML STR --

## (undated) DEVICE — SUT VCRL + 1 36IN CT1 VIO --

## (undated) DEVICE — REM POLYHESIVE ADULT PATIENT RETURN ELECTRODE: Brand: VALLEYLAB

## (undated) DEVICE — SOLUTION IRRIG 500ML 0.9% SOD CHL USP POUR PLAS BTL

## (undated) DEVICE — SOLUTION SCRB 4OZ 10% PVP I POVIDONE IOD TOP PAINT EXIDINE

## (undated) DEVICE — SYR 10ML LUER LOK 1/5ML GRAD --

## (undated) DEVICE — HANDLE LT SNAP ON ULT DURABLE LENS FOR TRUMPF ALC DISPOSABLE

## (undated) DEVICE — 3-0 COATED VICRYL PLUS UNDYED 1X27" SH --

## (undated) DEVICE — SURGICAL PROCEDURE PACK BASIN MAJ SET CUST NO CAUT

## (undated) DEVICE — SYRINGE IRRIG 60ML SFT PLIABLE BLB EZ TO GRP 1 HND USE W/

## (undated) DEVICE — MINOR GENERAL PACK: Brand: MEDLINE INDUSTRIES, INC.

## (undated) DEVICE — DBD-PACK,LAPAROTOMY,2 REINFORCED GOWNS: Brand: MEDLINE

## (undated) DEVICE — STERILE COTTON BALLS LARGE 5/P: Brand: MEDLINE

## (undated) DEVICE — DRAPE,LAPAROTOMY,PED,STERILE: Brand: MEDLINE